# Patient Record
Sex: FEMALE | Race: WHITE | Employment: OTHER | URBAN - METROPOLITAN AREA
[De-identification: names, ages, dates, MRNs, and addresses within clinical notes are randomized per-mention and may not be internally consistent; named-entity substitution may affect disease eponyms.]

---

## 2017-01-04 ENCOUNTER — HOSPITAL ENCOUNTER (OUTPATIENT)
Dept: GENERAL RADIOLOGY | Facility: HOSPITAL | Age: 57
Discharge: HOME | End: 2017-01-04
Attending: UROLOGY
Payer: MEDICAID

## 2017-01-04 DIAGNOSIS — N20.0 KIDNEY STONE: ICD-10-CM

## 2017-01-04 PROCEDURE — 74000 XR ABDOMEN (1 VIEW) (CPT=74000): CPT

## 2017-01-05 ENCOUNTER — OFFICE VISIT (OUTPATIENT)
Dept: SURGERY | Facility: CLINIC | Age: 57
End: 2017-01-05

## 2017-01-05 VITALS
TEMPERATURE: 98 F | BODY MASS INDEX: 21.38 KG/M2 | HEIGHT: 66 IN | DIASTOLIC BLOOD PRESSURE: 64 MMHG | RESPIRATION RATE: 16 BRPM | HEART RATE: 66 BPM | WEIGHT: 133 LBS | SYSTOLIC BLOOD PRESSURE: 110 MMHG

## 2017-01-05 DIAGNOSIS — N20.0 RENAL CALCULI: Primary | ICD-10-CM

## 2017-01-05 PROCEDURE — 52310 CYSTOSCOPY AND TREATMENT: CPT | Performed by: UROLOGY

## 2017-01-24 ENCOUNTER — TELEPHONE (OUTPATIENT)
Dept: SURGERY | Facility: CLINIC | Age: 57
End: 2017-01-24

## 2017-01-31 ENCOUNTER — TELEPHONE (OUTPATIENT)
Dept: SURGERY | Facility: CLINIC | Age: 57
End: 2017-01-31

## 2017-01-31 NOTE — TELEPHONE ENCOUNTER
Patient had cancelled appt for today 01/31/17 due to being sick. Would like to reschedule. appt was for fov s/p renal calculo and was scheduled for 40 mins. Patient would like to get a call back tomorrow. States today she will be resting all day.  Please ad

## 2017-02-16 ENCOUNTER — TELEPHONE (OUTPATIENT)
Dept: SURGERY | Facility: CLINIC | Age: 57
End: 2017-02-16

## 2017-02-16 NOTE — TELEPHONE ENCOUNTER
Please call patient back. Dr. Abigail Faulkner patient. If she is having intermittent pain, please get her in to see Dr. Alanna Quinones next week.   If she is having severe pain, she should go to the emergency room, so that the cause of the pain can be rapidly evaluated an

## 2017-02-16 NOTE — TELEPHONE ENCOUNTER
Spoke with pt. She is c/o nausea, right sided flank pain(describes as above her waist line),radiating to right lower abdominal discomfort, nausea. Denies fever or visualizing blood in her urine. She rates her pain, as \"4\" on pain scale.  States she onset

## 2017-02-16 NOTE — TELEPHONE ENCOUNTER
Phoned pt back and spoke with her. Read to her 's reply as outlined below in this encounter, in it's entirety. Pt verbalized understanding, agrees to plan, and is thankful.  Informed her will ask , when he returns next week, where she can be fit

## 2017-02-20 NOTE — TELEPHONE ENCOUNTER
Phoned pt and received voice mail. TCB. Clinic call back number provided.  (will offer appt for tomorrow, at 09:30 in a split procedure slot)

## 2017-02-20 NOTE — TELEPHONE ENCOUNTER
Pt rtn your call. Offered her appt tomorrow at 9:30am.  She was very thankful and will be here tomorrow.

## 2017-02-21 ENCOUNTER — APPOINTMENT (OUTPATIENT)
Dept: LAB | Facility: HOSPITAL | Age: 57
End: 2017-02-21
Attending: UROLOGY
Payer: MEDICAID

## 2017-02-21 ENCOUNTER — OFFICE VISIT (OUTPATIENT)
Dept: SURGERY | Facility: CLINIC | Age: 57
End: 2017-02-21

## 2017-02-21 VITALS
BODY MASS INDEX: 19.53 KG/M2 | RESPIRATION RATE: 16 BRPM | WEIGHT: 123 LBS | HEIGHT: 66.5 IN | HEART RATE: 84 BPM | SYSTOLIC BLOOD PRESSURE: 110 MMHG | DIASTOLIC BLOOD PRESSURE: 62 MMHG | TEMPERATURE: 98 F

## 2017-02-21 DIAGNOSIS — N20.0 KIDNEY STONE: ICD-10-CM

## 2017-02-21 DIAGNOSIS — N30.01 ACUTE CYSTITIS WITH HEMATURIA: ICD-10-CM

## 2017-02-21 DIAGNOSIS — N30.01 ACUTE CYSTITIS WITH HEMATURIA: Primary | ICD-10-CM

## 2017-02-21 LAB
BACTERIA UR QL AUTO: NEGATIVE /HPF
BILIRUB UR QL: NEGATIVE
COLOR UR: YELLOW
GLUCOSE UR-MCNC: NEGATIVE MG/DL
HGB UR QL STRIP.AUTO: NEGATIVE
NITRITE UR QL STRIP.AUTO: NEGATIVE
PH UR: 5 [PH] (ref 5–8)
PROT UR-MCNC: NEGATIVE MG/DL
RBC #/AREA URNS AUTO: 4 /HPF
SP GR UR STRIP: 1.02 (ref 1–1.03)
UROBILINOGEN UR STRIP-ACNC: <2
VIT C UR-MCNC: 40 MG/DL
WBC #/AREA URNS AUTO: 4 /HPF

## 2017-02-21 PROCEDURE — 81001 URINALYSIS AUTO W/SCOPE: CPT

## 2017-02-21 PROCEDURE — 99212 OFFICE O/P EST SF 10 MIN: CPT | Performed by: UROLOGY

## 2017-02-21 PROCEDURE — 99024 POSTOP FOLLOW-UP VISIT: CPT | Performed by: UROLOGY

## 2017-02-21 PROCEDURE — 87086 URINE CULTURE/COLONY COUNT: CPT

## 2017-02-21 NOTE — PROGRESS NOTES
Lance Gomez 104 Patient Status:  Outpatient    1960 MRN [id number]   Location [facility] Attending Erik Silva.  [address] #  PCP Mariposa Villarreal MD       Jose Miguel Ellis is a 60year old mouth every 8 (eight) hours as needed for Nausea. Disp: 10 tablet Rfl: 0   MetFORMIN HCl  MG Oral Tablet 24 Hr Take 2 tablets (1,000 mg total) by mouth 2 (two) times daily with meals.  Disp: 120 tablet Rfl: 3   Alcohol Swabs (BD SWAB SINGLE USE REGULA 75 mcg by mouth daily. Disp:  Rfl: 1   TRUEPLUS LANCETS 33G Does not apply Misc  Disp:  Rfl:    Hyoscyamine Sulfate 0.125 MG Oral Tab Take 250 mcg by mouth every 4 (four) hours as needed.    Disp:  Rfl: 0   Oxybutynin Chloride 5 MG Oral Tab TAKE 1 TABLET for which patient underwent extracorporeal shockwave lithotripsy December 23, 2016 and KUB showed good breakage and complete resolution of renal stone and patient had successful office cystoscopy stent removal January 5, 2017.   Patient was to follow-up in

## 2017-02-25 ENCOUNTER — HOSPITAL ENCOUNTER (OUTPATIENT)
Dept: GENERAL RADIOLOGY | Facility: HOSPITAL | Age: 57
Discharge: HOME | End: 2017-02-25
Attending: UROLOGY
Payer: MEDICAID

## 2017-02-25 DIAGNOSIS — N20.0 KIDNEY STONE: ICD-10-CM

## 2017-02-25 LAB — CREAT BLD-MCNC: 0.7 MG/DL (ref 0.5–1.5)

## 2017-02-25 PROCEDURE — 82565 ASSAY OF CREATININE: CPT

## 2017-02-25 PROCEDURE — 74415 UROGRAPHY NFS DRIP&/BLS W/NF: CPT

## 2017-02-27 ENCOUNTER — TELEPHONE (OUTPATIENT)
Dept: SURGERY | Facility: CLINIC | Age: 57
End: 2017-02-27

## 2017-02-27 NOTE — TELEPHONE ENCOUNTER
Patient was contacted and was informed of Urine Culture and IVP results as per [facility] [place] [facility]. Patient stated that she passed some fragments Saturday 2/25/17.  She stated that she was feeling slightly better however would call if she didn't feel better on Wedneday

## 2017-05-08 ENCOUNTER — TELEPHONE (OUTPATIENT)
Dept: INTERNAL MEDICINE CLINIC | Facility: CLINIC | Age: 57
End: 2017-05-08

## 2017-05-08 RX ORDER — METFORMIN HYDROCHLORIDE 500 MG/1
TABLET, EXTENDED RELEASE ORAL
Qty: 120 TABLET | Refills: 0 | Status: SHIPPED | OUTPATIENT
Start: 2017-05-08 | End: 2017-05-31

## 2017-05-10 ENCOUNTER — OFFICE VISIT (OUTPATIENT)
Dept: INTERNAL MEDICINE CLINIC | Facility: CLINIC | Age: 57
End: 2017-05-10

## 2017-05-10 VITALS
HEIGHT: 66 IN | DIASTOLIC BLOOD PRESSURE: 76 MMHG | WEIGHT: 131.19 LBS | HEART RATE: 80 BPM | RESPIRATION RATE: 18 BRPM | SYSTOLIC BLOOD PRESSURE: 118 MMHG | BODY MASS INDEX: 21.08 KG/M2

## 2017-05-10 DIAGNOSIS — R63.4 WEIGHT LOSS: ICD-10-CM

## 2017-05-10 DIAGNOSIS — F41.9 ANXIETY AND DEPRESSION: ICD-10-CM

## 2017-05-10 DIAGNOSIS — Z12.11 COLON CANCER SCREENING: ICD-10-CM

## 2017-05-10 DIAGNOSIS — Z12.31 VISIT FOR SCREENING MAMMOGRAM: ICD-10-CM

## 2017-05-10 DIAGNOSIS — E03.9 ACQUIRED HYPOTHYROIDISM: ICD-10-CM

## 2017-05-10 DIAGNOSIS — M54.41 CHRONIC RIGHT-SIDED LOW BACK PAIN WITH RIGHT-SIDED SCIATICA: ICD-10-CM

## 2017-05-10 DIAGNOSIS — Z00.00 ROUTINE HEALTH MAINTENANCE: ICD-10-CM

## 2017-05-10 DIAGNOSIS — F32.A ANXIETY AND DEPRESSION: ICD-10-CM

## 2017-05-10 DIAGNOSIS — G89.29 CHRONIC RIGHT-SIDED LOW BACK PAIN WITH RIGHT-SIDED SCIATICA: ICD-10-CM

## 2017-05-10 DIAGNOSIS — E11.9 TYPE 2 DIABETES MELLITUS WITHOUT COMPLICATION, WITHOUT LONG-TERM CURRENT USE OF INSULIN (HCC): Primary | ICD-10-CM

## 2017-05-10 PROCEDURE — 99212 OFFICE O/P EST SF 10 MIN: CPT | Performed by: INTERNAL MEDICINE

## 2017-05-10 PROCEDURE — 99215 OFFICE O/P EST HI 40 MIN: CPT | Performed by: INTERNAL MEDICINE

## 2017-05-10 NOTE — PROGRESS NOTES
HPI:    Patient ID: Bijan Kaufman is a 60year old female. HPI Comments: Pt used to go to [place]. She hasn't seen an internist since September. She had a kidney problem. She lost 25 pounds before January.     Diabetes  She presents for her follow-up MOUTH TWO TIMES A DAY WITH MEALS Disp: 120 tablet Rfl: 0   ondansetron 4 MG Oral Tablet Dispersible Take 1 tablet (4 mg total) by mouth every 8 (eight) hours as needed for Nausea.  Disp: 10 tablet Rfl: 0   Alcohol Swabs (BD SWAB SINGLE USE REGULAR) Does not and breath sounds normal. No respiratory distress. She has no wheezes. She has no rales. Abdominal: Soft. Bowel sounds are normal. She exhibits no mass. There is no tenderness. Musculoskeletal: Normal range of motion.    Lymphadenopathy:     She has no

## 2017-05-23 ENCOUNTER — TELEPHONE (OUTPATIENT)
Dept: INTERNAL MEDICINE CLINIC | Facility: CLINIC | Age: 57
End: 2017-05-23

## 2017-05-23 DIAGNOSIS — Z11.59 NEED FOR HEPATITIS C SCREENING TEST: Primary | ICD-10-CM

## 2017-05-23 NOTE — TELEPHONE ENCOUNTER
She recently had a loss in her family, 28 yo female  by a hit and run. Patient was upset, stated she is not taking care of herself, but will go to the lab Thursday.

## 2017-05-25 ENCOUNTER — LAB ENCOUNTER (OUTPATIENT)
Dept: LAB | Facility: HOSPITAL | Age: 57
End: 2017-05-25
Attending: INTERNAL MEDICINE
Payer: MEDICAID

## 2017-05-25 DIAGNOSIS — Z00.00 ROUTINE HEALTH MAINTENANCE: ICD-10-CM

## 2017-05-25 DIAGNOSIS — Z11.59 NEED FOR HEPATITIS C SCREENING TEST: ICD-10-CM

## 2017-05-25 DIAGNOSIS — E11.9 TYPE 2 DIABETES MELLITUS WITHOUT COMPLICATION, WITHOUT LONG-TERM CURRENT USE OF INSULIN (HCC): ICD-10-CM

## 2017-05-25 PROCEDURE — 80061 LIPID PANEL: CPT

## 2017-05-25 PROCEDURE — 84443 ASSAY THYROID STIM HORMONE: CPT

## 2017-05-25 PROCEDURE — 82043 UR ALBUMIN QUANTITATIVE: CPT

## 2017-05-25 PROCEDURE — 83036 HEMOGLOBIN GLYCOSYLATED A1C: CPT

## 2017-05-25 PROCEDURE — 86803 HEPATITIS C AB TEST: CPT

## 2017-05-25 PROCEDURE — 36415 COLL VENOUS BLD VENIPUNCTURE: CPT

## 2017-05-25 PROCEDURE — 85025 COMPLETE CBC W/AUTO DIFF WBC: CPT

## 2017-05-25 PROCEDURE — 82570 ASSAY OF URINE CREATININE: CPT

## 2017-05-25 PROCEDURE — 80053 COMPREHEN METABOLIC PANEL: CPT

## 2017-05-29 ENCOUNTER — TELEPHONE (OUTPATIENT)
Dept: INTERNAL MEDICINE CLINIC | Facility: CLINIC | Age: 57
End: 2017-05-29

## 2017-05-29 DIAGNOSIS — E78.00 HYPERCHOLESTEROLEMIA: Primary | ICD-10-CM

## 2017-05-29 RX ORDER — ROSUVASTATIN CALCIUM 5 MG/1
TABLET, COATED ORAL
Qty: 15 TABLET | Refills: 5 | Status: SHIPPED | OUTPATIENT
Start: 2017-05-29 | End: 2019-04-30

## 2017-05-29 NOTE — TELEPHONE ENCOUNTER
Please call pt: Your hepatitis C test is negative. You do not have hepatitis C. Your urine, kidney, liver, electrolytes, and thyroid tests were all normal.  Your white count was slightly low. We can repeat it in 3 months with your next A1C.   Your A1C w

## 2017-05-30 RX ORDER — METFORMIN HYDROCHLORIDE 500 MG/1
TABLET, EXTENDED RELEASE ORAL
Qty: 120 TABLET | Refills: 3 | OUTPATIENT
Start: 2017-05-30

## 2017-05-31 RX ORDER — METFORMIN HYDROCHLORIDE 500 MG/1
TABLET, EXTENDED RELEASE ORAL
Qty: 120 TABLET | Refills: 3 | Status: SHIPPED | OUTPATIENT
Start: 2017-05-31 | End: 2017-06-05

## 2017-06-05 RX ORDER — METFORMIN HYDROCHLORIDE 500 MG/1
TABLET, EXTENDED RELEASE ORAL
Qty: 120 TABLET | Refills: 0 | Status: SHIPPED | OUTPATIENT
Start: 2017-06-05 | End: 2017-11-06

## 2017-06-05 NOTE — TELEPHONE ENCOUNTER
Informed pt test results and Dr Nhung Diana message below. Pt states she needs a cardiology referral for injections for high cholesterol. Referral pended for review.

## 2017-06-06 NOTE — TELEPHONE ENCOUNTER
Spoke with patient and advised her  gave ok to see Cardiology. Patient voiced her understanding of this.

## 2017-08-30 ENCOUNTER — OFFICE VISIT (OUTPATIENT)
Dept: INTERNAL MEDICINE CLINIC | Facility: CLINIC | Age: 57
End: 2017-08-30

## 2017-08-30 VITALS
SYSTOLIC BLOOD PRESSURE: 114 MMHG | HEIGHT: 66 IN | WEIGHT: 127.38 LBS | HEART RATE: 84 BPM | DIASTOLIC BLOOD PRESSURE: 70 MMHG | BODY MASS INDEX: 20.47 KG/M2 | RESPIRATION RATE: 18 BRPM

## 2017-08-30 DIAGNOSIS — G89.29 CHRONIC RIGHT SHOULDER PAIN: ICD-10-CM

## 2017-08-30 DIAGNOSIS — Z12.31 VISIT FOR SCREENING MAMMOGRAM: ICD-10-CM

## 2017-08-30 DIAGNOSIS — E78.00 HYPERCHOLESTEREMIA: ICD-10-CM

## 2017-08-30 DIAGNOSIS — E11.9 TYPE 2 DIABETES MELLITUS WITHOUT COMPLICATION, WITHOUT LONG-TERM CURRENT USE OF INSULIN (HCC): Primary | ICD-10-CM

## 2017-08-30 DIAGNOSIS — M25.511 CHRONIC RIGHT SHOULDER PAIN: ICD-10-CM

## 2017-08-30 DIAGNOSIS — Z12.11 COLON CANCER SCREENING: ICD-10-CM

## 2017-08-30 DIAGNOSIS — E78.00 HYPERCHOLESTEROLEMIA: ICD-10-CM

## 2017-08-30 PROCEDURE — 99212 OFFICE O/P EST SF 10 MIN: CPT | Performed by: INTERNAL MEDICINE

## 2017-08-30 PROCEDURE — 99214 OFFICE O/P EST MOD 30 MIN: CPT | Performed by: INTERNAL MEDICINE

## 2017-08-30 NOTE — PATIENT INSTRUCTIONS
Please schedule your mammogram.  Call [phone]. Do fasting labs in November. Fast for 12 hours. Water is okay. Walk in.

## 2017-08-30 NOTE — ASSESSMENT & PLAN NOTE
Her cholesterol is extremely high. She never took the Crestor. She will try it. Recheck lipids in 3 months.

## 2017-08-30 NOTE — PROGRESS NOTES
HPI:    Patient ID: Jakub Rashid is a 59year old female. For 18 months she had left shoulder pain. She went to PT and the pain was so terribly severe. It is now manageable, but it now started on the right side.   She goes to the pain clinic for her b Calcium (CRESTOR) 5 MG Oral Tab One half tab on Monday, Wednesday, and Friday. Disp: 15 tablet Rfl: 5   ondansetron 4 MG Oral Tablet Dispersible Take 1 tablet (4 mg total) by mouth every 8 (eight) hours as needed for Nausea.  Disp: 10 tablet Rfl: 0   Alcoho Physical Exam   Nursing note and vitals reviewed. Constitutional: She is oriented to person, place, and time. She appears well-developed and well-nourished. HENT:   Head: Normocephalic and atraumatic.    Eyes: Conjunctivae and EOM are normal.   Cardio

## 2017-09-12 ENCOUNTER — OFFICE VISIT (OUTPATIENT)
Dept: NEUROLOGY | Facility: CLINIC | Age: 57
End: 2017-09-12

## 2017-09-12 VITALS
DIASTOLIC BLOOD PRESSURE: 54 MMHG | HEART RATE: 80 BPM | SYSTOLIC BLOOD PRESSURE: 104 MMHG | RESPIRATION RATE: 16 BRPM | HEIGHT: 66.5 IN | WEIGHT: 127 LBS | BODY MASS INDEX: 20.17 KG/M2

## 2017-09-12 DIAGNOSIS — M75.41 ROTATOR CUFF IMPINGEMENT SYNDROME OF RIGHT SHOULDER: Primary | ICD-10-CM

## 2017-09-12 PROCEDURE — 99204 OFFICE O/P NEW MOD 45 MIN: CPT | Performed by: PHYSICAL MEDICINE & REHABILITATION

## 2017-09-12 RX ORDER — ONDANSETRON 4 MG/1
4 TABLET, ORALLY DISINTEGRATING ORAL EVERY 8 HOURS PRN
Qty: 10 TABLET | Refills: 1 | Status: SHIPPED | OUTPATIENT
Start: 2017-09-12 | End: 2018-03-07

## 2017-09-12 RX ORDER — DICLOFENAC SODIUM 75 MG/1
75 TABLET, DELAYED RELEASE ORAL 2 TIMES DAILY
Qty: 60 TABLET | Refills: 0 | Status: SHIPPED | OUTPATIENT
Start: 2017-09-12 | End: 2017-10-12

## 2017-09-12 NOTE — TELEPHONE ENCOUNTER
PATIENT IS REQUESTING ZOFRAN FOR NAUSEA. PATENT STATES SHE'S NOT SURE IF DR. HOGAN IS THE ONE THAT PRESCRIBED THIS MEDICATION BUT SHE STATES SHE WANTS IT FOR NAUSEA.

## 2017-09-12 NOTE — H&P
No referring provider defined for this encounter.   Cristina Vance MD     PHYSICAL MEDICINE and REHABILITATION CONSULTATION    Chief Complaint: right shoulder pain    HPI: Loi Lux is a 59year old female who presents with complaints of Shoulder Pain ( Bowel/Bladder incontinence: no  Numbness/Tingling: yes  Seizure: no  Weight loss: unintended weight loss for the past year (25lbs in a year).     Past Medical History:   Diagnosis Date   • Diabetes [facility])    • Kidney stones      Family History   Problem Rela Occupational History  None on file     Social History Main Topics   Smoking status: Former Smoker  1.00 Packs/day  For 45.00 Years     Types: Cigarettes    Quit date: 11/30/2012    Smokeless tobacco: Never Used    Alcohol use No    Drug use: No    Sexual a Wrist extension 5 5       Finger flexion 5 5       Internal Rotation of Shoulder * 5       External Rotation of Shoulder 5 5       5=normal, 4=mild weak, 3=moderate,   2=severe (<antigravity), 1=tracing,   0=no movement.   *gives way due to pain    Sensatio

## 2017-09-12 NOTE — PATIENT INSTRUCTIONS
1) Your diagnosis is right rotator cuff tendinopathy. 2) Take voltaren 75mg twice a day as needed. Please take this with food to avoid an upset stomach.     3) If your pain continues in 2 weeks despite the medication please make a follow up appointment a prescription as our physicians rotate between multiple offices and procedure days in the hospitals. · Patient must present photo ID at time of .  If a designated family member will be picking up prescription, office must be given name of individual

## 2017-10-12 RX ORDER — DICLOFENAC SODIUM 75 MG/1
75 TABLET, DELAYED RELEASE ORAL 2 TIMES DAILY
Qty: 60 TABLET | Refills: 0 | Status: SHIPPED | OUTPATIENT
Start: 2017-10-12 | End: 2018-04-16

## 2017-10-12 NOTE — TELEPHONE ENCOUNTER
Refill request for diclofenac 75 mg, BID, #60, no refills    LOV: 9/12/17  NOV: none  Last refilled on 9/12/17

## 2017-11-06 ENCOUNTER — TELEPHONE (OUTPATIENT)
Dept: OTHER | Age: 57
End: 2017-11-06

## 2017-11-06 RX ORDER — METFORMIN HYDROCHLORIDE 500 MG/1
TABLET, EXTENDED RELEASE ORAL
Qty: 120 TABLET | Refills: 2 | Status: SHIPPED | OUTPATIENT
Start: 2017-11-06 | End: 2018-03-29

## 2017-11-06 NOTE — TELEPHONE ENCOUNTER
Pharmacy called requesting a refill. Refilled per protocol. Type 2 diabetes mellitus without complication, without long-term current use of insulin (HCC) - Primary        Her diabetes is controlled.   A1C was 6.5  cpm  She saw ophthal.  Urine is negat

## 2017-11-30 NOTE — TELEPHONE ENCOUNTER
Pharmacy called requesting refill for Current Outpatient Prescriptions:  Levothyroxine Sodium 75 MCG Oral Tab Take 75 mcg by mouth daily. Disp:  Rfl: 1     Please advise, thank you.

## 2017-12-01 RX ORDER — LEVOTHYROXINE SODIUM 0.07 MG/1
TABLET ORAL
Qty: 30 TABLET | Refills: 1 | Status: SHIPPED | OUTPATIENT
Start: 2017-12-01 | End: 2018-05-18

## 2017-12-01 NOTE — TELEPHONE ENCOUNTER
Patient is Junaid Tai - please advise on refill     Hypothyroid Medications  Protocol Criteria:  Appointment scheduled in the past 12 months or the next 3 months  TSH resulted in the past 12 months that is normal  Recent Outpatient Visits            2 kristen

## 2018-02-28 ENCOUNTER — TELEPHONE (OUTPATIENT)
Dept: OTHER | Age: 58
End: 2018-02-28

## 2018-02-28 NOTE — TELEPHONE ENCOUNTER
Pt stts she had to cancel her appt with you today because she woke up with dizziness and indigestion . Denies chest pain or shortness of breath. Pt cannot check her BS because her glucometer is not working.  I advised pt to eat her breakfast and drink fluid

## 2018-03-01 ENCOUNTER — OFFICE VISIT (OUTPATIENT)
Dept: INTERNAL MEDICINE CLINIC | Facility: CLINIC | Age: 58
End: 2018-03-01

## 2018-03-01 VITALS
BODY MASS INDEX: 20.49 KG/M2 | SYSTOLIC BLOOD PRESSURE: 90 MMHG | WEIGHT: 129 LBS | DIASTOLIC BLOOD PRESSURE: 58 MMHG | HEIGHT: 66.5 IN | HEART RATE: 80 BPM

## 2018-03-01 DIAGNOSIS — M75.41 ROTATOR CUFF IMPINGEMENT SYNDROME OF RIGHT SHOULDER: Primary | ICD-10-CM

## 2018-03-01 PROCEDURE — 99213 OFFICE O/P EST LOW 20 MIN: CPT | Performed by: PHYSICIAN ASSISTANT

## 2018-03-01 RX ORDER — AMITRIPTYLINE HYDROCHLORIDE 10 MG/1
10 TABLET, FILM COATED ORAL 3 TIMES DAILY
COMMUNITY
End: 2018-07-16

## 2018-03-01 NOTE — PROGRESS NOTES
HPI:    Patient ID: Víctor Dillard is a 59year old female. HPI   Patient with history of type 2 diabetes, hypothyroidism, and hyperlipidemia presents with ongoing right shoulder pain. Pain has been present for over a year.   She was recently seen by amanda as needed for Nausea. Disp: 10 tablet Rfl: 1   Rosuvastatin Calcium (CRESTOR) 5 MG Oral Tab One half tab on Monday, Wednesday, and Friday.  (Patient taking differently: One  tab on Monday, Wednesday, and Friday. ) Disp: 15 tablet Rfl: 5   Alcohol Swabs (BD placed in this encounter.       Meds This Visit:  No prescriptions requested or ordered in this encounter       Imaging & Referrals:  INTEGRATIVE MEDICINE PHYSICIAN CONSULTATION - INTERNAL REFERRAL  OP REFERRAL PAIN MANGEMENT         ZF#[id number]

## 2018-03-07 RX ORDER — ONDANSETRON 4 MG/1
TABLET, ORALLY DISINTEGRATING ORAL
Qty: 10 TABLET | Refills: 0 | Status: SHIPPED | OUTPATIENT
Start: 2018-03-07 | End: 2018-07-16

## 2018-03-07 NOTE — TELEPHONE ENCOUNTER
Please advise on refill request.    Refill Protocol Appointment Criteria  · Appointment scheduled in the past 6 months or in the next 3 months  Recent Outpatient Visits            6 days ago Rotator cuff impingement syndrome of right shoulder    Tomi MARTÍNEZ

## 2018-03-29 DIAGNOSIS — Z00.00 HEALTHCARE MAINTENANCE: Primary | ICD-10-CM

## 2018-04-01 RX ORDER — METFORMIN HYDROCHLORIDE 500 MG/1
TABLET, EXTENDED RELEASE ORAL
Qty: 120 TABLET | Refills: 0 | Status: SHIPPED | OUTPATIENT
Start: 2018-04-01 | End: 2018-05-18

## 2018-04-13 ENCOUNTER — TELEPHONE (OUTPATIENT)
Dept: OTHER | Age: 58
End: 2018-04-13

## 2018-04-13 NOTE — TELEPHONE ENCOUNTER
Spoke with patient and states since December, she experiences nausea, sweating, heart racing. Patient states she has lost 25 lbs in the past year without dieting. She states her glucometer is broken--unable to check her sugar.      Spoke with LV and rece

## 2018-04-16 ENCOUNTER — OFFICE VISIT (OUTPATIENT)
Dept: INTERNAL MEDICINE CLINIC | Facility: CLINIC | Age: 58
End: 2018-04-16

## 2018-04-16 VITALS
HEIGHT: 66.5 IN | SYSTOLIC BLOOD PRESSURE: 113 MMHG | BODY MASS INDEX: 20.3 KG/M2 | HEART RATE: 74 BPM | WEIGHT: 127.81 LBS | DIASTOLIC BLOOD PRESSURE: 67 MMHG

## 2018-04-16 DIAGNOSIS — E78.00 HYPERCHOLESTEREMIA: ICD-10-CM

## 2018-04-16 DIAGNOSIS — R92.2 DENSE BREAST: ICD-10-CM

## 2018-04-16 DIAGNOSIS — F32.A MILD DEPRESSION: ICD-10-CM

## 2018-04-16 DIAGNOSIS — Z12.31 VISIT FOR SCREENING MAMMOGRAM: ICD-10-CM

## 2018-04-16 DIAGNOSIS — Z12.11 COLON CANCER SCREENING: ICD-10-CM

## 2018-04-16 DIAGNOSIS — E11.9 TYPE 2 DIABETES MELLITUS WITHOUT COMPLICATION, WITHOUT LONG-TERM CURRENT USE OF INSULIN (HCC): Primary | ICD-10-CM

## 2018-04-16 DIAGNOSIS — M25.511 CHRONIC RIGHT SHOULDER PAIN: ICD-10-CM

## 2018-04-16 DIAGNOSIS — G89.29 CHRONIC RIGHT SHOULDER PAIN: ICD-10-CM

## 2018-04-16 DIAGNOSIS — E03.9 ACQUIRED HYPOTHYROIDISM: ICD-10-CM

## 2018-04-16 PROCEDURE — 99212 OFFICE O/P EST SF 10 MIN: CPT | Performed by: INTERNAL MEDICINE

## 2018-04-16 PROCEDURE — 99214 OFFICE O/P EST MOD 30 MIN: CPT | Performed by: INTERNAL MEDICINE

## 2018-04-16 RX ORDER — FLUOXETINE HYDROCHLORIDE 40 MG/1
CAPSULE ORAL
Qty: 90 CAPSULE | Refills: 1 | Status: SHIPPED | OUTPATIENT
Start: 2018-04-16 | End: 2019-02-05

## 2018-04-16 NOTE — PROGRESS NOTES
HPI:    Patient ID: Chey Aguirre is a 59year old female. Pt has not been feeling well. She feels lightheaded frequently. Her sugar is all over the place from . She doesn't feel like it is predictable. She is only taking metformin.   She does no EVERY 8 HOURS AS NEEDED FOR NAUSEA Disp: 10 tablet Rfl: 0   Levothyroxine Sodium 75 MCG Oral Tab Pt will need a new doctor, because [place] is no longer in her insurance. 1 daily.  Disp: 30 tablet Rfl: 1   Rosuvastatin Calcium (CRESTOR) 5 MG Oral Tab One well-developed and well-nourished. HENT:   Head: Normocephalic and atraumatic. Eyes: Conjunctivae and EOM are normal.   Cardiovascular: Normal rate, regular rhythm and normal heart sounds.     Pulmonary/Chest: Effort normal and breath sounds normal. No Medicaid. She has tried many other antidepressants. I encouraged her to pursue the [employer] $4 fluoxetine prescription program.  Patient agreed that this would be a good plan for her.          Relevant Medications    FLUoxetine HCl 40 MG Oral Cap      Othe

## 2018-04-17 NOTE — ASSESSMENT & PLAN NOTE
Patient has chronic right shoulder pain and frozen shoulder. She sees a specialist for this and receives oxycodone. She says this prevents her from sleeping at night.

## 2018-04-17 NOTE — ASSESSMENT & PLAN NOTE
The patient has a very erratic eating schedule where she skips breakfast and lunch and then eats multiple meals late in the day. She checks her sugars and and says they are quite labile.   She has not had an A1c since last May which she says is due to Friedman

## 2018-04-17 NOTE — ASSESSMENT & PLAN NOTE
Per patient fluoxetine is not covered under Medicaid. She has tried many other antidepressants. I encouraged her to pursue the [employer] $4 fluoxetine prescription program.  Patient agreed that this would be a good plan for her.

## 2018-04-17 NOTE — ASSESSMENT & PLAN NOTE
Patient's cholesterol was 340 last year when she agreed to start Crestor. She is taking this twice a week. Will check lipids.

## 2018-04-20 ENCOUNTER — LAB ENCOUNTER (OUTPATIENT)
Dept: LAB | Facility: HOSPITAL | Age: 58
End: 2018-04-20
Attending: INTERNAL MEDICINE
Payer: MEDICAID

## 2018-04-20 DIAGNOSIS — Z00.00 HEALTHCARE MAINTENANCE: ICD-10-CM

## 2018-04-20 PROCEDURE — 36415 COLL VENOUS BLD VENIPUNCTURE: CPT

## 2018-04-20 PROCEDURE — 80061 LIPID PANEL: CPT

## 2018-04-20 PROCEDURE — 80053 COMPREHEN METABOLIC PANEL: CPT

## 2018-04-20 PROCEDURE — 82570 ASSAY OF URINE CREATININE: CPT

## 2018-04-20 PROCEDURE — 82043 UR ALBUMIN QUANTITATIVE: CPT

## 2018-04-20 PROCEDURE — 84443 ASSAY THYROID STIM HORMONE: CPT

## 2018-04-20 PROCEDURE — 83036 HEMOGLOBIN GLYCOSYLATED A1C: CPT

## 2018-04-20 PROCEDURE — 85025 COMPLETE CBC W/AUTO DIFF WBC: CPT

## 2018-05-18 RX ORDER — LEVOTHYROXINE SODIUM 0.07 MG/1
TABLET ORAL
Qty: 90 TABLET | Refills: 0 | Status: SHIPPED | OUTPATIENT
Start: 2018-05-18 | End: 2018-08-03

## 2018-05-18 RX ORDER — METFORMIN HYDROCHLORIDE 500 MG/1
TABLET, EXTENDED RELEASE ORAL
Qty: 180 TABLET | Refills: 0 | Status: SHIPPED | OUTPATIENT
Start: 2018-05-18 | End: 2018-08-06

## 2018-05-18 NOTE — TELEPHONE ENCOUNTER
Diabetes Medication Protocol Passed5/18 2:41 PM   Creatinine in the past 12 months    Last A1C < 7.5 and within past 6 months    Last serum creatinine result < 1.5    Appointment in past 6 or next 3 months       Thyroid Medication Protocol Passed5/18 2:41

## 2018-06-27 ENCOUNTER — TELEPHONE (OUTPATIENT)
Dept: OTHER | Age: 58
End: 2018-06-27

## 2018-06-27 NOTE — TELEPHONE ENCOUNTER
Pt had to cancel appt today due to car trouble. Rescheduled for 7/16. Wants to know if that is ok since she had protein in her urine at last visit 4/16/18. She does not currently check her blood sugars at home.   Advised pt make sure she keep appt for 7/

## 2018-06-29 ENCOUNTER — OFFICE VISIT (OUTPATIENT)
Dept: OPTOMETRY | Facility: CLINIC | Age: 58
End: 2018-06-29

## 2018-06-29 DIAGNOSIS — H40.003 GLAUCOMA SUSPECT, BILATERAL: ICD-10-CM

## 2018-06-29 DIAGNOSIS — H25.13 AGE-RELATED NUCLEAR CATARACT OF BOTH EYES: ICD-10-CM

## 2018-06-29 DIAGNOSIS — E11.9 TYPE 2 DIABETES MELLITUS WITHOUT COMPLICATION, WITHOUT LONG-TERM CURRENT USE OF INSULIN (HCC): Primary | ICD-10-CM

## 2018-06-29 DIAGNOSIS — H52.4 PRESBYOPIA: ICD-10-CM

## 2018-06-29 PROCEDURE — 92004 COMPRE OPH EXAM NEW PT 1/>: CPT | Performed by: OPTOMETRIST

## 2018-06-29 NOTE — PROGRESS NOTES
Parisa Bello is a 59year old female. HPI:     HPI     Diabetic Eye Exam   Diabetes characteristics include Type 2, controlled with diet and taking oral medications. Duration of 8 years. Number of years on pills 8.   Does PT check his/her own bloodsugar BY MOUTH DAILY Disp: 90 tablet Rfl: 0   FLUoxetine HCl 40 MG Oral Cap TAKE ONE CAPSULE BY MOUTH IN THE MORNING ONCE DAILY Disp: 90 capsule Rfl: 1   Blood Glucose Monitoring Suppl Supplies Does not apply Misc Please supply patient with glucose meter, test s Extraocular Movement       Right Left     Full, Ortho Full, Ortho          Neuro/Psych     Oriented x3:  Yes          Dilation     Both eyes:  1.0% Mydriacyl and 2.5% Kaden Synephrine @ 3:03 PM            Additional Tests     Amsler       Right Left     Norm Presbyopia  Patient will continue with OTC reading glasses. Age-related nuclear cataract of both eyes  No treatment is required. Will continue to observe.         Orders Placed This Encounter      OCT, Optic Nerve - OU - Both Eyes      Yo Puckett

## 2018-06-29 NOTE — ASSESSMENT & PLAN NOTE
I advised patient that due to CD asymmetry and IOP that she should have a baseline VF OCT and pachy. Appointment has been made.

## 2018-06-29 NOTE — PATIENT INSTRUCTIONS
Glaucoma suspect, bilateral  I advised patient that due to CD asymmetry and IOP that she should have a baseline VF OCT and pachy. Appointment has been made.     Type 2 diabetes mellitus without complication, without long-term current use of insulin ([facility]  I

## 2018-07-07 ENCOUNTER — NURSE ONLY (OUTPATIENT)
Dept: OPHTHALMOLOGY | Facility: CLINIC | Age: 58
End: 2018-07-07

## 2018-07-07 DIAGNOSIS — H40.003 GLAUCOMA SUSPECT, BILATERAL: ICD-10-CM

## 2018-07-07 PROCEDURE — 76514 ECHO EXAM OF EYE THICKNESS: CPT | Performed by: OPHTHALMOLOGY

## 2018-07-07 PROCEDURE — 92133 CPTRZD OPH DX IMG PST SGM ON: CPT | Performed by: OPHTHALMOLOGY

## 2018-07-07 PROCEDURE — 92083 EXTENDED VISUAL FIELD XM: CPT | Performed by: OPHTHALMOLOGY

## 2018-07-10 NOTE — PROGRESS NOTES
Maryellen Ervin is a 59year old female. HPI:     HPI     Patient is here for a glaucoma work up with  HVF, OCT and Pachy, enrique BUCHANAN     Last edited by Sandee Akbar on 7/7/2018 10:39 AM. (History)        Patient History:  Past Medical History:   Diagnosis Da and Friday.  (Patient taking differently: Pt takes one whole tab two times a week ) Disp: 15 tablet Rfl: 5   Alcohol Swabs (BD SWAB SINGLE USE REGULAR) Does not apply Pads  Disp:  Rfl:    Blood Glucose Monitoring Suppl (TRUE METRIX METER) W/DEVICE Does not

## 2018-07-16 ENCOUNTER — APPOINTMENT (OUTPATIENT)
Dept: LAB | Age: 58
End: 2018-07-16
Attending: INTERNAL MEDICINE
Payer: MEDICAID

## 2018-07-16 ENCOUNTER — OFFICE VISIT (OUTPATIENT)
Dept: INTERNAL MEDICINE CLINIC | Facility: CLINIC | Age: 58
End: 2018-07-16

## 2018-07-16 VITALS
HEART RATE: 77 BPM | DIASTOLIC BLOOD PRESSURE: 67 MMHG | SYSTOLIC BLOOD PRESSURE: 114 MMHG | HEIGHT: 66.5 IN | BODY MASS INDEX: 19.44 KG/M2 | WEIGHT: 122.38 LBS

## 2018-07-16 DIAGNOSIS — Z12.11 SCREEN FOR COLON CANCER: ICD-10-CM

## 2018-07-16 DIAGNOSIS — E78.00 HYPERCHOLESTEREMIA: ICD-10-CM

## 2018-07-16 DIAGNOSIS — E11.9 TYPE 2 DIABETES MELLITUS WITHOUT COMPLICATION, WITHOUT LONG-TERM CURRENT USE OF INSULIN (HCC): ICD-10-CM

## 2018-07-16 DIAGNOSIS — R63.4 WEIGHT LOSS: ICD-10-CM

## 2018-07-16 DIAGNOSIS — M75.41 ROTATOR CUFF IMPINGEMENT SYNDROME OF RIGHT SHOULDER: ICD-10-CM

## 2018-07-16 DIAGNOSIS — E11.9 TYPE 2 DIABETES MELLITUS WITHOUT COMPLICATION, WITHOUT LONG-TERM CURRENT USE OF INSULIN (HCC): Primary | ICD-10-CM

## 2018-07-16 LAB
ANION GAP SERPL CALC-SCNC: 5 MMOL/L (ref 0–18)
BILIRUB UR QL: NEGATIVE
BUN SERPL-MCNC: 10 MG/DL (ref 8–20)
BUN/CREAT SERPL: 12.2 (ref 10–20)
CALCIUM SERPL-MCNC: 9.9 MG/DL (ref 8.5–10.5)
CHLORIDE SERPL-SCNC: 103 MMOL/L (ref 95–110)
CLARITY UR: CLEAR
CO2 SERPL-SCNC: 30 MMOL/L (ref 22–32)
COLOR UR: YELLOW
CREAT SERPL-MCNC: 0.82 MG/DL (ref 0.5–1.5)
GLUCOSE SERPL-MCNC: 130 MG/DL (ref 70–99)
GLUCOSE UR-MCNC: NEGATIVE MG/DL
HGB UR QL STRIP.AUTO: NEGATIVE
KETONES UR-MCNC: NEGATIVE MG/DL
NITRITE UR QL STRIP.AUTO: NEGATIVE
OSMOLALITY UR CALC.SUM OF ELEC: 287 MOSM/KG (ref 275–295)
PH UR: 7 [PH] (ref 5–8)
POTASSIUM SERPL-SCNC: 4.3 MMOL/L (ref 3.3–5.1)
PROT UR-MCNC: NEGATIVE MG/DL
RBC #/AREA URNS AUTO: <1 /HPF
SODIUM SERPL-SCNC: 138 MMOL/L (ref 136–144)
SP GR UR STRIP: 1.01 (ref 1–1.03)
UROBILINOGEN UR STRIP-ACNC: <2
VIT C UR-MCNC: NEGATIVE MG/DL
WBC #/AREA URNS AUTO: 17 /HPF

## 2018-07-16 PROCEDURE — 87086 URINE CULTURE/COLONY COUNT: CPT | Performed by: INTERNAL MEDICINE

## 2018-07-16 PROCEDURE — 81001 URINALYSIS AUTO W/SCOPE: CPT | Performed by: INTERNAL MEDICINE

## 2018-07-16 PROCEDURE — 36415 COLL VENOUS BLD VENIPUNCTURE: CPT

## 2018-07-16 PROCEDURE — 80048 BASIC METABOLIC PNL TOTAL CA: CPT

## 2018-07-16 PROCEDURE — 99212 OFFICE O/P EST SF 10 MIN: CPT | Performed by: INTERNAL MEDICINE

## 2018-07-16 PROCEDURE — 83036 HEMOGLOBIN GLYCOSYLATED A1C: CPT

## 2018-07-16 PROCEDURE — 99214 OFFICE O/P EST MOD 30 MIN: CPT | Performed by: INTERNAL MEDICINE

## 2018-07-16 RX ORDER — EZETIMIBE 10 MG/1
10 TABLET ORAL DAILY
Qty: 90 TABLET | Refills: 1 | Status: SHIPPED | OUTPATIENT
Start: 2018-07-16 | End: 2019-07-11

## 2018-07-16 RX ORDER — ONDANSETRON 4 MG/1
TABLET, ORALLY DISINTEGRATING ORAL
Qty: 10 TABLET | Refills: 0 | Status: SHIPPED | OUTPATIENT
Start: 2018-07-16 | End: 2018-09-01

## 2018-07-16 NOTE — PATIENT INSTRUCTIONS
Today's blood test non-fasting. Do fasting labs before next appointment in October. Please schedule your mammogram.  Call [phone].

## 2018-07-16 NOTE — ASSESSMENT & PLAN NOTE
Pt has very high cholesterol but refuses statin due to intolerance (myalgia). Will try zetia. Recheck labs in 3 months.

## 2018-07-16 NOTE — ASSESSMENT & PLAN NOTE
Pt has lost 8 pounds this year. Advised pt to first do routine cancer screening tests, such as mammogram and colonoscopy.   If she continues to have weight loss, will do other test.

## 2018-07-16 NOTE — ASSESSMENT & PLAN NOTE
Her diabetes is controlled. Pt refuses pneumovax. Will start ARB next visit because starting Zetia now. Refuses statin due to intolerance. F/u 3 months.

## 2018-07-16 NOTE — PROGRESS NOTES
HPI:    Patient ID: Hi Newby is a 59year old female. She has been having right shoulder pain. The pain doctor ordered an MRI. He is treating her with oxycodone for 10 years for that and back pain. She doesn't check her blood sugars.   She takes her OxyCODONE HCl IR 30 MG Oral Tab Take 30 mg by mouth as needed.    Disp:  Rfl: 0   Blood Glucose Monitoring Suppl Supplies Does not apply Misc Please supply patient with glucose meter, test strips and lancets covered by her insurance, test TID Disp: 1 kit and normal heart sounds. Pulmonary/Chest: Effort normal and breath sounds normal. No respiratory distress. Neurological: She is alert and oriented to person, place, and time.               ASSESSMENT/PLAN:     Problem List Items Addressed This Visit

## 2018-07-17 LAB — HBA1C MFR BLD: 6.9 % (ref 4–6)

## 2018-07-20 ENCOUNTER — TELEPHONE (OUTPATIENT)
Dept: INTERNAL MEDICINE CLINIC | Facility: CLINIC | Age: 58
End: 2018-07-20

## 2018-07-20 NOTE — TELEPHONE ENCOUNTER
PA for Ezetimibe 10 mg tab completed with [employer] via CMM response time 3-5 business days [address]

## 2018-07-20 NOTE — TELEPHONE ENCOUNTER
Fax received at [address] [place] [address] following medication needs Prior auth. Plan does not cove this medication. Please call plan at [phone] to initiate prior auth or call/fax pharmacy to change medication. Patient JJ#[id number].       Current Outpatient Prescription

## 2018-08-04 RX ORDER — LEVOTHYROXINE SODIUM 0.07 MG/1
TABLET ORAL
Qty: 90 TABLET | Refills: 0 | Status: SHIPPED | OUTPATIENT
Start: 2018-08-04 | End: 2018-11-15

## 2018-08-04 NOTE — TELEPHONE ENCOUNTER
Refill passed per [facility] protocol.   Hypothyroid Medications  Protocol Criteria:  Appointment scheduled in the past 12 months or the next 3 months  TSH resulted in the past 12 months that is normal  Recent Outpatient Visits            2 weeks ago T

## 2018-08-06 RX ORDER — METFORMIN HYDROCHLORIDE 500 MG/1
TABLET, EXTENDED RELEASE ORAL
Qty: 360 TABLET | Refills: 0 | Status: SHIPPED | OUTPATIENT
Start: 2018-08-06 | End: 2018-11-15

## 2018-08-11 ENCOUNTER — HOSPITAL ENCOUNTER (OUTPATIENT)
Dept: MAMMOGRAPHY | Facility: HOSPITAL | Age: 58
Discharge: HOME | End: 2018-08-11
Attending: INTERNAL MEDICINE
Payer: MEDICAID

## 2018-08-11 DIAGNOSIS — R92.2 DENSE BREAST: ICD-10-CM

## 2018-08-11 DIAGNOSIS — Z12.31 VISIT FOR SCREENING MAMMOGRAM: ICD-10-CM

## 2018-08-11 PROCEDURE — 77067 SCR MAMMO BI INCL CAD: CPT | Performed by: INTERNAL MEDICINE

## 2018-08-11 PROCEDURE — 77063 BREAST TOMOSYNTHESIS BI: CPT | Performed by: INTERNAL MEDICINE

## 2018-08-22 ENCOUNTER — OFFICE VISIT (OUTPATIENT)
Dept: GASTROENTEROLOGY | Facility: CLINIC | Age: 58
End: 2018-08-22
Payer: MEDICAID

## 2018-08-22 ENCOUNTER — TELEPHONE (OUTPATIENT)
Dept: GASTROENTEROLOGY | Facility: CLINIC | Age: 58
End: 2018-08-22

## 2018-08-22 VITALS
WEIGHT: 122.63 LBS | HEART RATE: 81 BPM | BODY MASS INDEX: 19.71 KG/M2 | SYSTOLIC BLOOD PRESSURE: 96 MMHG | DIASTOLIC BLOOD PRESSURE: 57 MMHG | HEIGHT: 66 IN

## 2018-08-22 DIAGNOSIS — R63.4 UNINTENTIONAL WEIGHT LOSS: ICD-10-CM

## 2018-08-22 DIAGNOSIS — Z12.11 COLON CANCER SCREENING: ICD-10-CM

## 2018-08-22 DIAGNOSIS — Z88.8 ALLERGY TO IODINE: ICD-10-CM

## 2018-08-22 DIAGNOSIS — Z12.11 ENCOUNTER FOR SCREENING COLONOSCOPY: Primary | ICD-10-CM

## 2018-08-22 DIAGNOSIS — Z80.0 FAMILY HISTORY OF COLON CANCER: ICD-10-CM

## 2018-08-22 DIAGNOSIS — R63.4 UNINTENDED WEIGHT LOSS: ICD-10-CM

## 2018-08-22 DIAGNOSIS — R11.0 NAUSEA: Primary | ICD-10-CM

## 2018-08-22 DIAGNOSIS — R63.4 UNINTENTIONAL WEIGHT LOSS: Primary | ICD-10-CM

## 2018-08-22 DIAGNOSIS — R11.0 NAUSEA: ICD-10-CM

## 2018-08-22 PROCEDURE — 99212 OFFICE O/P EST SF 10 MIN: CPT | Performed by: PHYSICIAN ASSISTANT

## 2018-08-22 PROCEDURE — 99244 OFF/OP CNSLTJ NEW/EST MOD 40: CPT | Performed by: PHYSICIAN ASSISTANT

## 2018-08-22 RX ORDER — POLYETHYLENE GLYCOL 3350, SODIUM CHLORIDE, SODIUM BICARBONATE, POTASSIUM CHLORIDE 420; 11.2; 5.72; 1.48 G/4L; G/4L; G/4L; G/4L
POWDER, FOR SOLUTION ORAL
Qty: 1 BOTTLE | Refills: 0 | Status: SHIPPED | OUTPATIENT
Start: 2018-08-22 | End: 2019-04-30

## 2018-08-22 RX ORDER — GABAPENTIN 800 MG/1
800 TABLET ORAL ONCE AS NEEDED
Refills: 1 | COMMUNITY
Start: 2018-08-03 | End: 2019-04-30

## 2018-08-22 RX ORDER — CLONAZEPAM 1 MG/1
1 TABLET ORAL DAILY
Refills: 0 | COMMUNITY
Start: 2018-08-21 | End: 2018-10-16 | Stop reason: ALTCHOICE

## 2018-08-22 NOTE — H&P
[facility], [facility] - Gastroenterology                                                                                                  Clinic History and Physical     Ramiro Tolbert anesthesia once but does not provide any more details with this. See above normal stress test. Patient states she needed clearance for lithotripsy.      Personal Hx Cancer(s):  - None    Surgical Hx:  - Appendectomy  - Tonsillectomy   - No known history of daily. Disp:  Rfl: 0   PEG 3350-KCl-Na Bicarb-NaCl (TRILYTE) 420 g Oral Recon Soln Split dose preparation - take as directed.  Disp: 1 Bottle Rfl: 0   METFORMIN HCL  MG Oral Tablet 24 Hr TAKE 2 TABLETS BY MOUTH EVERY 12 HOURS Disp: 360 tablet Rfl: 0 INTEGUMENT/BREAST:  SKIN:  negative for jaundice   ENDOCRINE:  negative for cold intolerance and heat intolerance  MUSCULOSKELETAL:  negative for joint effusion/severe erythema +R sided shoulder   BEHAVIOR/PSYCH:  negative for psychotic behavior +anxiety While medication s/e cannot be r/o, I would recommend bi-directional and CT A/P to r/o malignancy of the GI tract. She was advised to stop smoking. She provides vague abdominal pain history to me today which I tell her to keep an eye on if this returns.  ER Moira Peacock PA-C  8/22/2018

## 2018-08-22 NOTE — TELEPHONE ENCOUNTER
LMTCB. Please transfer to triage. Per verbal from [address] [place] [address] patient has had a recent CT somewhere else in the last 1 year, route message back to her.

## 2018-08-22 NOTE — TELEPHONE ENCOUNTER
Initiated prior auth for CT per Fe Kimball nurse at [email or web address] [phone] (Ref # [id number])  She will  need to send information to their physician for review and fax us a response. Patient contacted and advised of message above.  States she has had 3 CT scans r

## 2018-08-22 NOTE — TELEPHONE ENCOUNTER
Scheduled for:  Colonoscopy [address]  Provider Name:   Date:  11/20/18  Location:  [facility]  Sedation:  MAC  Time: 2272 / Arrival 0930   Prep:Split dose Colyte  Meds/Allergies Reconciled?:  Physician reviewed  Diagnosis with codes:  Colon Cancer Scr

## 2018-08-22 NOTE — TELEPHONE ENCOUNTER
I asked the patient if she has had CT scans recently and she replied \"I do not know\". I do not see any CT scans in the chart from this year. Please ask the patient.

## 2018-08-22 NOTE — PATIENT INSTRUCTIONS
1. Schedule upper endoscopy and colonoscopy with Dr. Isidoro Myers or Nany Hernández with MAC sedation at [facility] or [facility]. 2.  bowel prep from pharmacy - I have prescribed Trilyte split dose preparation.  This is a small

## 2018-08-23 RX ORDER — PREDNISONE 50 MG/1
TABLET ORAL
Qty: 3 TABLET | Refills: 0 | Status: SHIPPED | OUTPATIENT
Start: 2018-08-23 | End: 2019-04-30

## 2018-08-23 NOTE — TELEPHONE ENCOUNTER
Forwarded to Gusto PA--pt transferred from phone room. I confirmed with pt that she was referring to the 11/21/16 CT kidney and the 2/25/17 IVP. She had not remembered dates before. Please note no contrast with kidney CT or IVP.  States her previous reactio

## 2018-08-24 NOTE — TELEPHONE ENCOUNTER
Spoke to [employer] at [facility] for PA status on CT- states it is still under medical review at this time.  -Awaiting insurance decision at this time.

## 2018-08-24 NOTE — TELEPHONE ENCOUNTER
Thank you for updating allergies - as per the OV chart, I reviewed allergies with her, as did the MA. Please verify if the patient has any remaining allergies.     I placed CT A/P with the radiology recommendations for dosing for contrast allergies - predni

## 2018-08-27 ENCOUNTER — TELEPHONE (OUTPATIENT)
Dept: INTERNAL MEDICINE CLINIC | Facility: CLINIC | Age: 58
End: 2018-08-27

## 2018-08-27 NOTE — TELEPHONE ENCOUNTER
Med not on patient's profile. Leah Bui, pharmacist: Dewayne Jose and pharmacist states they did not send any requests for Vitamin D. Sadi Allen phone room advised that fax request from [facility] states patient requested medication from pharmacy.

## 2018-08-28 NOTE — TELEPHONE ENCOUNTER
CT not authorized per insurance. Patient needs additional testing for weight loss per Dana FONG. Waiting for denial letter to be faxed to us.

## 2018-08-29 NOTE — TELEPHONE ENCOUNTER
When the letter is received, I will do a letter of appeal or [id number]. No further action on my behalf currently.

## 2018-08-29 NOTE — TELEPHONE ENCOUNTER
URIEL-   Still waiting for a denial letter. I spoke with [place] from [place] and she stated she will send another  request to have it faxed again. Fax number verified.

## 2018-08-29 NOTE — TELEPHONE ENCOUNTER
Per pharmacy waiting for the rx med of pt, told pharmacy that waiting for the pt call and they understood.

## 2018-09-01 RX ORDER — ONDANSETRON 4 MG/1
TABLET, ORALLY DISINTEGRATING ORAL
Qty: 10 TABLET | Refills: 2 | Status: SHIPPED | OUTPATIENT
Start: 2018-09-01 | End: 2020-12-03

## 2018-09-01 NOTE — TELEPHONE ENCOUNTER
Please review and advise regarding pended refill request as unclear if this is meant to be a chronic med.     Last Rx= 7/16/18    Recent Outpatient Visits            1 week ago Encounter for screening colonoscopy    [facility], [facility], [address]

## 2018-09-04 NOTE — TELEPHONE ENCOUNTER
Routed to [facility]- did you receive denial letter? Mckay Hay RN has requested twice and we have not yet received. If you received it, please fax to  at [phone], please and thank you.

## 2018-09-06 NOTE — TELEPHONE ENCOUNTER
Rachel Shaikh   You 23 hours ago (9:36 AM)      PA WAS INITIATED BY Carito Byrd RN NOT MANAGED CARE  (Routing comment)

## 2018-09-06 NOTE — TELEPHONE ENCOUNTER
Spoke to Bethany at [place] and states that have faxed the appeal letter several times to the number provided to them when the PA was initiated (they had phone:[phone] and fax:[phone]), I reviewed that both numbers were incorrect.      She states

## 2018-09-11 NOTE — TELEPHONE ENCOUNTER
Pt contacted and reviewed PB message below, she verbalized understanding of all and she scheduled the following f/u appt, directions provided to the [address], and told to arrive 15 mins earlier:  Future Appointments   Date Time Provider Rosalie Kennedy   9/2

## 2018-09-11 NOTE — TELEPHONE ENCOUNTER
Please make a non-urgent follow up appointment at the end of September for the patient to see me in office. It would be easier for us to discuss our options in this way.

## 2018-09-21 ENCOUNTER — TELEPHONE (OUTPATIENT)
Dept: GASTROENTEROLOGY | Facility: CLINIC | Age: 58
End: 2018-09-21

## 2018-09-21 NOTE — TELEPHONE ENCOUNTER
Called Pt to remind her about her Order for CT SCAN of the abdomen on 8/22/18 by Eleni Cline PA-C . Mailed letter to patient notifying her of overdue labs CT Scan of the Abdomen Ordered 08/22/18 and due 09/21/18. Overdue letter mailed to patient.

## 2018-10-16 ENCOUNTER — OFFICE VISIT (OUTPATIENT)
Dept: INTERNAL MEDICINE CLINIC | Facility: CLINIC | Age: 58
End: 2018-10-16
Payer: MEDICAID

## 2018-10-16 VITALS
DIASTOLIC BLOOD PRESSURE: 61 MMHG | TEMPERATURE: 99 F | HEIGHT: 66 IN | HEART RATE: 65 BPM | BODY MASS INDEX: 19.74 KG/M2 | WEIGHT: 122.81 LBS | SYSTOLIC BLOOD PRESSURE: 96 MMHG

## 2018-10-16 DIAGNOSIS — J02.9 SORE THROAT: ICD-10-CM

## 2018-10-16 DIAGNOSIS — R80.9 TYPE 2 DIABETES MELLITUS WITH MICROALBUMINURIA, WITHOUT LONG-TERM CURRENT USE OF INSULIN (HCC): ICD-10-CM

## 2018-10-16 DIAGNOSIS — H61.23 CERUMEN DEBRIS ON TYMPANIC MEMBRANE OF BOTH EARS: ICD-10-CM

## 2018-10-16 DIAGNOSIS — G47.00 INSOMNIA, UNSPECIFIED TYPE: Primary | ICD-10-CM

## 2018-10-16 DIAGNOSIS — R63.4 WEIGHT LOSS: ICD-10-CM

## 2018-10-16 DIAGNOSIS — E11.29 TYPE 2 DIABETES MELLITUS WITH MICROALBUMINURIA, WITHOUT LONG-TERM CURRENT USE OF INSULIN (HCC): ICD-10-CM

## 2018-10-16 PROBLEM — H52.4 PRESBYOPIA: Status: RESOLVED | Noted: 2018-06-29 | Resolved: 2018-10-16

## 2018-10-16 PROCEDURE — 99212 OFFICE O/P EST SF 10 MIN: CPT | Performed by: INTERNAL MEDICINE

## 2018-10-16 PROCEDURE — 99214 OFFICE O/P EST MOD 30 MIN: CPT | Performed by: INTERNAL MEDICINE

## 2018-10-16 RX ORDER — TRAZODONE HYDROCHLORIDE 50 MG/1
TABLET ORAL
Qty: 30 TABLET | Refills: 1 | Status: SHIPPED | OUTPATIENT
Start: 2018-10-16 | End: 2019-04-30

## 2018-10-16 NOTE — PROGRESS NOTES
HPI:    Patient ID: Sai Patient is a 59year old female. Pt c/o left side of her throat is sore. Her left ear is plugged. Her nose is running. Her right arm is very painful with decreased ROM.   She was taken off Klonapin because she is on oxycodone DiphenhydrAMINE HCl 50 MG Oral Tab Take one (1) 50mg tablet by mouth one (1) hour before the examination. Disp: 1 tablet Rfl: 0   gabapentin 800 MG Oral Tab Take 800 mg by mouth once as needed.  Disp:  Rfl: 1   METFORMIN HCL  MG Oral Tablet 24 Hr TA Tobacco Use      Smoking status: Former Smoker        Packs/day: 1.00        Years: 45.00        Pack years: 39        Types: Cigarettes        Quit date: 2012        Years since quittin.8      Smokeless tobacco: Never Used    Alcohol use:  No although she does not have diarrhea. She saw the eye doctor. She is taking low dose Crestor and is due for another lipid panel. F/u in 6 weeks. Unprioritized    Weight loss     Her weight has been stable for 3 months.          Insomnia - [place]

## 2018-10-16 NOTE — PATIENT INSTRUCTIONS
Use Debrox ear drops for 2 weeks. Come back for an earwash if your left ear is still plugged. Do fasting labs soon. Fast for 12 hours. Water is okay. Walk in.

## 2018-10-17 RX ORDER — FLUOXETINE HYDROCHLORIDE 20 MG/1
CAPSULE ORAL
Qty: 90 CAPSULE | Refills: 0 | Status: SHIPPED | OUTPATIENT
Start: 2018-10-17 | End: 2018-11-15

## 2018-10-17 NOTE — TELEPHONE ENCOUNTER
Refill Protocol Appointment Criteria  · Appointment scheduled in the past 6 months or in the next 3 months  Recent Outpatient Visits            Yesterday Insomnia, unspecified type    Lazarus Ranies MD    Office Visit

## 2018-10-17 NOTE — ASSESSMENT & PLAN NOTE
Her last A1C was 6.9  She has been losing weight and she thinks it is due to the metformin, although she does not have diarrhea. She saw the eye doctor. She is taking low dose Crestor and is due for another lipid panel. F/u in 6 weeks.

## 2018-10-18 ENCOUNTER — OFFICE VISIT (OUTPATIENT)
Dept: OBGYN CLINIC | Facility: CLINIC | Age: 58
End: 2018-10-18
Payer: MEDICAID

## 2018-10-18 VITALS
BODY MASS INDEX: 19.44 KG/M2 | HEIGHT: 66 IN | DIASTOLIC BLOOD PRESSURE: 82 MMHG | SYSTOLIC BLOOD PRESSURE: 118 MMHG | WEIGHT: 121 LBS

## 2018-10-18 DIAGNOSIS — Z01.419 WELL WOMAN EXAM WITH ROUTINE GYNECOLOGICAL EXAM: Primary | ICD-10-CM

## 2018-10-18 PROCEDURE — 99386 PREV VISIT NEW AGE 40-64: CPT | Performed by: OBSTETRICS & GYNECOLOGY

## 2018-10-18 RX ORDER — CEPHALEXIN 500 MG/1
CAPSULE ORAL
Refills: 1 | COMMUNITY
Start: 2018-08-23 | End: 2018-10-18

## 2018-10-18 NOTE — PROGRESS NOTES
HPI:   Trinity Dias is a 59year old female who presents for an annual/pap.        Wt Readings from Last 6 Encounters:  10/18/18 : 121 lb (54.9 kg)  10/16/18 : 122 lb 12.8 oz (55.7 kg)  08/22/18 : 122 lb 9.6 oz (55.6 kg)  07/16/18 : 122 lb 6.4 oz (55.5 kg) Calcium (CRESTOR) 5 MG Oral Tab One half tab on Monday, Wednesday, and Friday.  (Patient taking differently: Pt takes one whole tab two times a week ) Disp: 15 tablet Rfl: 5   Alcohol Swabs (BD SWAB SINGLE USE REGULAR) Does not apply Pads  Disp:  Rfl:    Bl SYSTEMS:   GENERAL: feels well otherwise  SKIN: denies any unusual skin lesions  EYES:denies blurred vision or double vision  HEENT: denies nasal congestion, sinus pain or ST  LUNGS: denies shortness of breath with exertion  CARDIOVASCULAR: denies chest pa

## 2018-10-25 ENCOUNTER — OFFICE VISIT (OUTPATIENT)
Dept: GASTROENTEROLOGY | Facility: CLINIC | Age: 58
End: 2018-10-25
Payer: MEDICAID

## 2018-10-25 VITALS
WEIGHT: 122 LBS | BODY MASS INDEX: 19.61 KG/M2 | HEIGHT: 66 IN | SYSTOLIC BLOOD PRESSURE: 94 MMHG | DIASTOLIC BLOOD PRESSURE: 44 MMHG | HEART RATE: 68 BPM

## 2018-10-25 DIAGNOSIS — R10.9 ABDOMINAL DISCOMFORT: ICD-10-CM

## 2018-10-25 DIAGNOSIS — Z80.0 FAMILY HISTORY OF COLON CANCER: ICD-10-CM

## 2018-10-25 DIAGNOSIS — Z09 FOLLOW UP: Primary | ICD-10-CM

## 2018-10-25 DIAGNOSIS — R63.4 UNINTENTIONAL WEIGHT LOSS: ICD-10-CM

## 2018-10-25 PROCEDURE — 99214 OFFICE O/P EST MOD 30 MIN: CPT | Performed by: PHYSICIAN ASSISTANT

## 2018-10-25 PROCEDURE — 99212 OFFICE O/P EST SF 10 MIN: CPT | Performed by: PHYSICIAN ASSISTANT

## 2018-10-25 NOTE — PROGRESS NOTES
[facility] Follow-up Visit    Meme eats less as she has to watch her diet for DM. States always tired. No recent blood loss (6 months). Last blood loss was hematuria due to kidney stone. She denies changes in bowels, diarrhea, significant constipation, rectal bleeding or melena.  When asked • Colon Cancer Maternal Grandmother    • Other (Kidney Cancer) Maternal Grandmother    • Colon Cancer Other         Nephew   • Heart Disease Mother         Details unknown   • Hypertension Brother    • Colon Polyps Brother    • Colon Cancer Cousin    • O Glucose Monitoring Suppl Supplies Does not apply Misc Please supply patient with glucose meter, test strips and lancets covered by her insurance, test TID Disp: 1 kit Rfl: 0   Rosuvastatin Calcium (CRESTOR) 5 MG Oral Tab One half tab on Monday, Wednesday, comfortable and in no acute discomfort  HEENT: conjunctiva pink, the sclera appears anicteric  CV: regular rate and rhythm  Lung: moves air well; no labored breathing  Abdomen: soft, flat, non-tender, non-distended abdomen  +BS   Skin: dry, warm, no jaundi not wish to pursue imaging such as US - would like to wait until bi-directional is completed.      Recommend:  - Talk to Dr. Lion Simpson about smoking cessation   - Bi-directional confirmed with Dr. Darcy Guzman - instructions re-given  - Orders per 8/22  - Food/symp

## 2018-10-25 NOTE — PATIENT INSTRUCTIONS
1. Until the CT Scan, do NOT take prednisone or diphenhydramine (benadryl) - these were only prescribed due to the contrast allergy for the CT scan. 2. We will give you preparation instructions again for your procedures(s).     3. Pending the procedures,

## 2018-11-04 RX ORDER — ERGOCALCIFEROL 1.25 MG/1
CAPSULE ORAL
Qty: 4 CAPSULE | Refills: 3 | Status: SHIPPED | OUTPATIENT
Start: 2018-11-04 | End: 2019-02-23

## 2018-11-14 ENCOUNTER — APPOINTMENT (OUTPATIENT)
Dept: LAB | Facility: HOSPITAL | Age: 58
End: 2018-11-14
Attending: NURSE PRACTITIONER
Payer: MEDICAID

## 2018-11-14 ENCOUNTER — TELEPHONE (OUTPATIENT)
Dept: INTERNAL MEDICINE CLINIC | Facility: CLINIC | Age: 58
End: 2018-11-14

## 2018-11-14 ENCOUNTER — HOSPITAL ENCOUNTER (OUTPATIENT)
Dept: GENERAL RADIOLOGY | Facility: HOSPITAL | Age: 58
Discharge: HOME | End: 2018-11-14
Attending: NURSE PRACTITIONER
Payer: MEDICAID

## 2018-11-14 DIAGNOSIS — E11.9 TYPE 2 DIABETES MELLITUS WITHOUT COMPLICATION, WITHOUT LONG-TERM CURRENT USE OF INSULIN (HCC): ICD-10-CM

## 2018-11-14 DIAGNOSIS — M25.511 PAIN IN RIGHT SHOULDER: ICD-10-CM

## 2018-11-14 DIAGNOSIS — E78.00 HYPERCHOLESTEREMIA: ICD-10-CM

## 2018-11-14 PROCEDURE — 73010 X-RAY EXAM OF SHOULDER BLADE: CPT | Performed by: NURSE PRACTITIONER

## 2018-11-14 PROCEDURE — 36415 COLL VENOUS BLD VENIPUNCTURE: CPT

## 2018-11-14 PROCEDURE — 83036 HEMOGLOBIN GLYCOSYLATED A1C: CPT

## 2018-11-14 PROCEDURE — 84450 TRANSFERASE (AST) (SGOT): CPT

## 2018-11-14 PROCEDURE — 80061 LIPID PANEL: CPT

## 2018-11-14 PROCEDURE — 84460 ALANINE AMINO (ALT) (SGPT): CPT

## 2018-11-15 ENCOUNTER — TELEPHONE (OUTPATIENT)
Dept: GASTROENTEROLOGY | Facility: CLINIC | Age: 58
End: 2018-11-15

## 2018-11-15 RX ORDER — LEVOTHYROXINE SODIUM 0.07 MG/1
TABLET ORAL
Qty: 60 TABLET | Refills: 2 | Status: SHIPPED | OUTPATIENT
Start: 2018-11-15 | End: 2019-05-13

## 2018-11-15 RX ORDER — METFORMIN HYDROCHLORIDE 500 MG/1
TABLET, EXTENDED RELEASE ORAL
Qty: 120 TABLET | Refills: 2 | Status: SHIPPED | OUTPATIENT
Start: 2018-11-15 | End: 2019-03-05

## 2018-11-15 RX ORDER — FLUOXETINE HYDROCHLORIDE 20 MG/1
CAPSULE ORAL
Qty: 90 CAPSULE | Refills: 0 | Status: SHIPPED | OUTPATIENT
Start: 2018-11-15 | End: 2018-12-12

## 2018-11-15 NOTE — TELEPHONE ENCOUNTER
Advised patient of Dr. Stearns Best note. Patient verbalized understanding.       Srikanth Chatman MD   Em Rn Triage 3 hours ago (4:19 PM)      Non emergent issue and a culture is not the way to check for protein in the urine, I forwarded the message to the [place]

## 2018-11-15 NOTE — TELEPHONE ENCOUNTER
Records received not pertinent to patient's GI work up. CT for stone protocol - not abd/pelvis - no acute abd process other than partially obstructing stone in R ureter (wherein patient underwent cystoscopy).  Remaining records re: cystoscopy not reviewe

## 2018-11-15 NOTE — TELEPHONE ENCOUNTER
Refill passed per [facility] protocol.   Hypothyroid Medications  Protocol Criteria:  Appointment scheduled in the past 12 months or the next 3 months  TSH resulted in the past 12 months that is normal  Recent Outpatient Visits            3 weeks ago [address] [place], [place]    Office Visit    4 months ago Type 2 diabetes mellitus without complication, without long-term current use of insulin [facility])    Angelica Thakkar, [address] [place] [address], Kirsten Felix MD    Office Visit        Future Appointments

## 2018-11-20 ENCOUNTER — ANESTHESIA (OUTPATIENT)
Dept: ENDOSCOPY | Facility: HOSPITAL | Age: 58
End: 2018-11-20
Payer: MEDICAID

## 2018-11-20 ENCOUNTER — ANESTHESIA EVENT (OUTPATIENT)
Dept: ENDOSCOPY | Facility: HOSPITAL | Age: 58
End: 2018-11-20
Payer: MEDICAID

## 2018-11-20 ENCOUNTER — HOSPITAL ENCOUNTER (OUTPATIENT)
Facility: HOSPITAL | Age: 58
Setting detail: HOSPITAL OUTPATIENT SURGERY
Discharge: HOME | End: 2018-11-20
Attending: INTERNAL MEDICINE | Admitting: INTERNAL MEDICINE
Payer: MEDICAID

## 2018-11-20 DIAGNOSIS — R63.4 UNINTENDED WEIGHT LOSS: ICD-10-CM

## 2018-11-20 DIAGNOSIS — R11.0 NAUSEA: ICD-10-CM

## 2018-11-20 DIAGNOSIS — Z80.0 FAMILY HISTORY OF COLON CANCER: ICD-10-CM

## 2018-11-20 DIAGNOSIS — Z12.11 COLON CANCER SCREENING: ICD-10-CM

## 2018-11-20 PROCEDURE — 0DB98ZX EXCISION OF DUODENUM, VIA NATURAL OR ARTIFICIAL OPENING ENDOSCOPIC, DIAGNOSTIC: ICD-10-PCS | Performed by: INTERNAL MEDICINE

## 2018-11-20 PROCEDURE — 0DB68ZX EXCISION OF STOMACH, VIA NATURAL OR ARTIFICIAL OPENING ENDOSCOPIC, DIAGNOSTIC: ICD-10-PCS | Performed by: INTERNAL MEDICINE

## 2018-11-20 PROCEDURE — 43239 EGD BIOPSY SINGLE/MULTIPLE: CPT | Performed by: INTERNAL MEDICINE

## 2018-11-20 PROCEDURE — 0DJD8ZZ INSPECTION OF LOWER INTESTINAL TRACT, VIA NATURAL OR ARTIFICIAL OPENING ENDOSCOPIC: ICD-10-PCS | Performed by: INTERNAL MEDICINE

## 2018-11-20 PROCEDURE — 45378 DIAGNOSTIC COLONOSCOPY: CPT | Performed by: INTERNAL MEDICINE

## 2018-11-20 RX ORDER — DEXTROSE MONOHYDRATE 25 G/50ML
50 INJECTION, SOLUTION INTRAVENOUS
Status: DISCONTINUED | OUTPATIENT
Start: 2018-11-20 | End: 2018-11-20

## 2018-11-20 RX ORDER — ONDANSETRON 2 MG/ML
INJECTION INTRAMUSCULAR; INTRAVENOUS AS NEEDED
Status: DISCONTINUED | OUTPATIENT
Start: 2018-11-20 | End: 2018-11-20 | Stop reason: SURG

## 2018-11-20 RX ORDER — SODIUM CHLORIDE, SODIUM LACTATE, POTASSIUM CHLORIDE, CALCIUM CHLORIDE 600; 310; 30; 20 MG/100ML; MG/100ML; MG/100ML; MG/100ML
INJECTION, SOLUTION INTRAVENOUS CONTINUOUS
Status: DISCONTINUED | OUTPATIENT
Start: 2018-11-20 | End: 2018-11-20

## 2018-11-20 RX ORDER — NALOXONE HYDROCHLORIDE 0.4 MG/ML
80 INJECTION, SOLUTION INTRAMUSCULAR; INTRAVENOUS; SUBCUTANEOUS AS NEEDED
Status: DISCONTINUED | OUTPATIENT
Start: 2018-11-20 | End: 2018-11-20

## 2018-11-20 RX ORDER — DEXAMETHASONE SODIUM PHOSPHATE 4 MG/ML
VIAL (ML) INJECTION AS NEEDED
Status: DISCONTINUED | OUTPATIENT
Start: 2018-11-20 | End: 2018-11-20 | Stop reason: SURG

## 2018-11-20 RX ADMIN — SODIUM CHLORIDE, SODIUM LACTATE, POTASSIUM CHLORIDE, CALCIUM CHLORIDE: 600; 310; 30; 20 INJECTION, SOLUTION INTRAVENOUS at 10:57:00

## 2018-11-20 RX ADMIN — ONDANSETRON 4 MG: 2 INJECTION INTRAMUSCULAR; INTRAVENOUS at 11:10:00

## 2018-11-20 RX ADMIN — DEXAMETHASONE SODIUM PHOSPHATE 4 MG: 4 MG/ML VIAL (ML) INJECTION at 11:10:00

## 2018-11-20 NOTE — ANESTHESIA POSTPROCEDURE EVALUATION
Patient: Robi Urrutia    Procedure Summary     Date:  11/20/18 Room / Location:  [address] [place] [address] ENDOSCOPY 05 / [address] [place] [address] ENDOSCOPY    Anesthesia Start:  4136 Anesthesia Stop:      Procedures:       COLONOSCOPY (N/A )      ESOPHAGOGASTRODUODENOSCOPY (EGD) (N/A ) Diagnosis:

## 2018-11-20 NOTE — OPERATIVE REPORT
EGD AND COLONOSCOPY PROCEDURE REPORTS:    DATE OF PROCEDURE:  11/20/2018    PCP: Shea Leos MD     PREOPERATIVE DIAGNOSIS:  Nausea, abnormal weight loss, screening colonoscopy     POSTOPERATIVE DIAGNOSIS:  See impression. SURGEON:  Juan FONG including appendiceal orifice, cecal strap, ileocecal valve. Retroflexion was performed in the rectum. The quality of the prep was good.      COLONOSCOPY FINDINGS:  · Small internal hemorrhoids only on colonoscopy examination to the cecum and terminal i

## 2018-11-20 NOTE — ANESTHESIA PREPROCEDURE EVALUATION
Anesthesia PreOp Note    HPI:     Walt Young is a 59year old female who presents for preoperative consultation requested by: Jeff Corley MD    Date of Surgery: 11/20/2018    Procedure(s):  COLONOSCOPY  ESOPHAGOGASTRODUODENOSCOPY (EGD)  In • LITHOTRIPSY     • TONSILLECTOMY           Medications Prior to Admission:  LEVOTHYROXINE SODIUM 75 MCG Oral Tab TAKE ONE TABLET BY MOUTH ONCE DAILY Disp: 60 tablet Rfl: 2 11/18/2018   METFORMIN HCL  MG Oral Tablet 24 Hr TAKE TWO TABLETS BY MOUTH (Patient taking differently: Pt takes one whole tab two times a week ) Disp: 15 tablet Rfl: 5 Taking   Alcohol Swabs (BD SWAB SINGLE USE REGULAR) Does not apply Pads  Disp:  Rfl:  Taking   Blood Glucose Monitoring Suppl (TRUE METRIX METER) W/DEVICE Does no Smoking status: Former Smoker        Packs/day: 1.00        Years: 45.00        Pack years: 39        Types: Cigarettes        Quit date: 2018        Years since quittin.0      Smokeless tobacco: Never Used      Tobacco comment: Quit Monday    Davis Abdominal  - normal exam    Abdomen: soft.              Anesthesia Plan:   ASA:  2  Plan:   MAC  Post-op Pain Management: IV analgesics  Informed Consent Plan and Risks Discussed With:  Patient      I have informed Rachel Dais and/or legal guardian or fam

## 2018-11-20 NOTE — H&P
History & Physical Examination    Patient Name: Brittany Valdez  MRN: [id number]  CSN: [id number]  YOB: 1960    Diagnosis: Nausea, abnormal weight loss, screening colonoscopy     Present Illness: As above; see recent office consultation and foll Does not apply Misc Please supply patient with glucose meter, test strips and lancets covered by her insurance, test TID Disp: 1 kit Rfl: 0 Taking   Rosuvastatin Calcium (CRESTOR) 5 MG Oral Tab One half tab on Monday, Wednesday, and Friday.  (Patient taking Past Surgical History:   Procedure Laterality Date   • APPENDECTOMY     • LITHOTRIPSY     • TONSILLECTOMY       Family History   Problem Relation Age of Onset   • Heart Disease Father         Details unknown   • Hypertension Father    • Colon Cancer Ma

## 2018-11-21 VITALS
HEIGHT: 66.5 IN | DIASTOLIC BLOOD PRESSURE: 61 MMHG | RESPIRATION RATE: 16 BRPM | BODY MASS INDEX: 19.06 KG/M2 | HEART RATE: 65 BPM | OXYGEN SATURATION: 99 % | WEIGHT: 120 LBS | SYSTOLIC BLOOD PRESSURE: 113 MMHG

## 2018-11-23 ENCOUNTER — HOSPITAL ENCOUNTER (EMERGENCY)
Facility: HOSPITAL | Age: 58
Discharge: HOME | End: 2018-11-24
Attending: EMERGENCY MEDICINE
Payer: MEDICAID

## 2018-11-23 DIAGNOSIS — R73.9 HYPERGLYCEMIA: ICD-10-CM

## 2018-11-23 DIAGNOSIS — R42 DIZZINESS: Primary | ICD-10-CM

## 2018-11-23 PROCEDURE — 85025 COMPLETE CBC W/AUTO DIFF WBC: CPT | Performed by: EMERGENCY MEDICINE

## 2018-11-23 PROCEDURE — 99285 EMERGENCY DEPT VISIT HI MDM: CPT

## 2018-11-23 PROCEDURE — 83735 ASSAY OF MAGNESIUM: CPT | Performed by: EMERGENCY MEDICINE

## 2018-11-23 PROCEDURE — 81001 URINALYSIS AUTO W/SCOPE: CPT | Performed by: EMERGENCY MEDICINE

## 2018-11-23 PROCEDURE — 96374 THER/PROPH/DIAG INJ IV PUSH: CPT

## 2018-11-23 PROCEDURE — 80048 BASIC METABOLIC PNL TOTAL CA: CPT | Performed by: EMERGENCY MEDICINE

## 2018-11-23 RX ORDER — LORAZEPAM 0.5 MG/1
0.5 TABLET ORAL EVERY 4 HOURS PRN
COMMUNITY
End: 2021-03-09 | Stop reason: ALTCHOICE

## 2018-11-24 ENCOUNTER — APPOINTMENT (OUTPATIENT)
Dept: MRI IMAGING | Facility: HOSPITAL | Age: 58
End: 2018-11-24
Attending: EMERGENCY MEDICINE
Payer: MEDICAID

## 2018-11-24 ENCOUNTER — APPOINTMENT (OUTPATIENT)
Dept: CT IMAGING | Facility: HOSPITAL | Age: 58
End: 2018-11-24
Attending: EMERGENCY MEDICINE
Payer: MEDICAID

## 2018-11-24 VITALS
RESPIRATION RATE: 17 BRPM | TEMPERATURE: 98 F | DIASTOLIC BLOOD PRESSURE: 56 MMHG | SYSTOLIC BLOOD PRESSURE: 114 MMHG | BODY MASS INDEX: 18.96 KG/M2 | WEIGHT: 118 LBS | HEART RATE: 60 BPM | HEIGHT: 66 IN | OXYGEN SATURATION: 96 %

## 2018-11-24 PROCEDURE — 70551 MRI BRAIN STEM W/O DYE: CPT | Performed by: EMERGENCY MEDICINE

## 2018-11-24 PROCEDURE — 70450 CT HEAD/BRAIN W/O DYE: CPT | Performed by: EMERGENCY MEDICINE

## 2018-11-24 RX ORDER — ONDANSETRON 2 MG/ML
4 INJECTION INTRAMUSCULAR; INTRAVENOUS ONCE
Status: COMPLETED | OUTPATIENT
Start: 2018-11-24 | End: 2018-11-24

## 2018-11-24 NOTE — ED INITIAL ASSESSMENT (HPI)
States that she ate 5 truffles and states that afterwards she \"felt funny. \" patient reports that she is a diabetic.

## 2018-11-24 NOTE — ED PROVIDER NOTES
Patient Seen in: [facility] AND [facility] Emergency Department    History   Patient presents with:  Hyperglycemia (metabolic)    Stated Complaint: hyperglycemia; thinks she passed out but last sugar 235; patient seems saying i*    HPI    49-year-old female with HPI.  Constitutional and vital signs reviewed. All other systems reviewed and negative except as noted above.     Physical Exam     ED Triage Vitals [11/23/18 7777]   /48   Pulse 81   Resp 16   Temp 98.2 °F (36.8 °C)   Temp src Oral   SpO2 98 % components within normal limits   CBC W/ DIFFERENTIAL - Abnormal; Notable for the following components:    RDW 15.3 (*)     MPV 7.0 (*)     Neutrophil Absolute 1.5 (*)     All other components within normal limits   MAGNESIUM - Normal   CBC WITH DIFFERENTI encounter diagnosis)  Hyperglycemia    Disposition:  Discharge  11/24/2018  2:51 am    Follow-up:  Lenora Romeo MD  [facility]  [phone]    Schedule an appointment as soon as possible for a visit in 2 days          Aaliyah Terry

## 2018-12-03 ENCOUNTER — TELEPHONE (OUTPATIENT)
Dept: GASTROENTEROLOGY | Facility: CLINIC | Age: 58
End: 2018-12-03

## 2018-12-03 NOTE — TELEPHONE ENCOUNTER
I mailed out colonoscopy results letter to pt  Updated health maintenance  Entered into 5 yr recall  Recall colon in 5 years per. Colon done 11/20/18    GI RNs - 1.  Please print and mail this letter to patient; 2. Recall for colonoscopy exam in 5 years

## 2018-12-12 RX ORDER — FLUOXETINE HYDROCHLORIDE 20 MG/1
CAPSULE ORAL
Qty: 90 CAPSULE | Refills: 0 | Status: SHIPPED | OUTPATIENT
Start: 2018-12-12 | End: 2019-01-09

## 2018-12-13 NOTE — TELEPHONE ENCOUNTER
Refill passed per [facility] protocol.   Refill Protocol Appointment Criteria  · Appointment scheduled in the past 6 months or in the next 3 months  Recent Outpatient Visits            1 month ago Follow up    [facility], [facility]

## 2019-01-03 ENCOUNTER — OFFICE VISIT (OUTPATIENT)
Dept: GASTROENTEROLOGY | Facility: CLINIC | Age: 59
End: 2019-01-03
Payer: MEDICAID

## 2019-01-03 VITALS
HEART RATE: 82 BPM | HEIGHT: 66 IN | DIASTOLIC BLOOD PRESSURE: 54 MMHG | SYSTOLIC BLOOD PRESSURE: 98 MMHG | BODY MASS INDEX: 19.71 KG/M2 | WEIGHT: 122.63 LBS

## 2019-01-03 DIAGNOSIS — R11.0 CHRONIC NAUSEA: Primary | ICD-10-CM

## 2019-01-03 DIAGNOSIS — Z09 FOLLOW UP: ICD-10-CM

## 2019-01-03 PROCEDURE — 99214 OFFICE O/P EST MOD 30 MIN: CPT | Performed by: PHYSICIAN ASSISTANT

## 2019-01-03 PROCEDURE — 99212 OFFICE O/P EST SF 10 MIN: CPT | Performed by: PHYSICIAN ASSISTANT

## 2019-01-03 RX ORDER — PROCHLORPERAZINE MALEATE 5 MG/1
5 TABLET ORAL NIGHTLY PRN
Qty: 30 TABLET | Refills: 1 | Status: SHIPPED | OUTPATIENT
Start: 2019-01-03 | End: 2019-02-25

## 2019-01-03 NOTE — PATIENT INSTRUCTIONS
1. Try Compazine 5 mg each night. This medication may make you tired.  Please therein make an appointment to see Dr. Ankur Evans in 8-12 weeks to review this medication and he may suggest you try Reglan or alternate between the two.    2. Please discontinue Zofr

## 2019-01-03 NOTE — PROGRESS NOTES
[facility] Follow-up Visit    Meme intestinal/duodenal mucosa demonstrating unremarkable features including preservation of the villous and glandular architecture.   · No evidence of viral inclusions, epithelioid granulomas, active/acute duodenitis, increased intraepithelial lymphocytes, aaron in her health - scheduled for bi-directional November with Dr. Chidi Snider (8/22/18):     She was at 154 lbs in her 46s - within 8 years she has consistently been decreasing her weight.  She notes that this started shortly after Metformin initiation, (chronic obstructive pulmonary disease) (HCC)    • Diabetes ([facility] [address]    • Disorder of thyroid    • High cholesterol    • Kidney stones    • PONV (postoperative nausea and vomiting)    • Rotator cuff impingement syndrome of right shoulder 9/12/2017      Past S Rfl: 2   ERGOCALCIFEROL 18368 units Oral Cap TAKE ONE CAPSULE BY MOUTH EVERY WEEK Disp: 4 capsule Rfl: 3   VENTOLIN  (90 Base) MCG/ACT Inhalation Aero Soln INL 2 PUFFS PO Q 4 H PRN Disp:  Rfl: 5   ONDANSETRON 4 MG Oral Tablet Dispersible DISSOLVE 1 tablet Rfl: 5       Allergies:    Radiology Contrast *    HIVES, SWELLING, SHORTNESS OF                            BREATH    Comment:Had some kind of contrast many years ago with an             x-ray.   Statins                 Myopathy  Other brother's son (nephew) diagnosed at age 44, maternal grandmother dx in mid-55s and a cousin who passed away from [facility]. Normal stress test December 2016. CT A/P denied by insurance - per last OV if sxs continued, we would consider re-ordering CT A/P.  No anem Compazine is working   - Consideration for Gastric Emptying Scan   - ER s/sxs discussed  - Discontinue Zofran, begin Compazine per above  - See above    Orders This Visit:  No orders of the defined types were placed in this encounter.       Meds This Visit:

## 2019-01-09 RX ORDER — FLUOXETINE HYDROCHLORIDE 20 MG/1
CAPSULE ORAL
Qty: 90 CAPSULE | Refills: 0 | Status: SHIPPED | OUTPATIENT
Start: 2019-01-09 | End: 2019-02-05

## 2019-01-10 NOTE — TELEPHONE ENCOUNTER
Refill passed per [facility] protocol.   Refill Protocol Appointment Criteria  · Appointment scheduled in the past 6 months or in the next 3 months  Recent Outpatient Visits            6 days ago Chronic nausea    [facility], [address] [place] [address]

## 2019-02-05 RX ORDER — FLUOXETINE HYDROCHLORIDE 20 MG/1
CAPSULE ORAL
Qty: 60 CAPSULE | Refills: 1 | Status: SHIPPED | OUTPATIENT
Start: 2019-02-05 | End: 2019-04-01

## 2019-02-13 ENCOUNTER — TELEPHONE (OUTPATIENT)
Dept: GASTROENTEROLOGY | Facility: CLINIC | Age: 59
End: 2019-02-13

## 2019-02-13 NOTE — TELEPHONE ENCOUNTER
Reminder letter mailed to Patient for her Overdue labs order for the following:    [facility] GI GASTRIC EMPTYING STUDY -- Order date 1/3/19  CT SCAN ABDOMEN + PELVIS ----Order date 8/23/19

## 2019-02-24 RX ORDER — ERGOCALCIFEROL 1.25 MG/1
CAPSULE ORAL
Qty: 4 CAPSULE | Refills: 1 | Status: SHIPPED | OUTPATIENT
Start: 2019-02-24 | End: 2019-04-18

## 2019-02-24 NOTE — TELEPHONE ENCOUNTER
Review pended refill request as it does not fall under a protocol.     Last Rx: 2-5-19  LOV: 10-16-18

## 2019-02-25 DIAGNOSIS — R11.0 CHRONIC NAUSEA: ICD-10-CM

## 2019-02-27 RX ORDER — PROCHLORPERAZINE MALEATE 5 MG/1
5 TABLET ORAL NIGHTLY PRN
Qty: 30 TABLET | Refills: 1 | Status: SHIPPED | OUTPATIENT
Start: 2019-02-27 | End: 2019-04-19

## 2019-02-27 NOTE — TELEPHONE ENCOUNTER
Recommend:  - Talk to Dr. Vesta Andrews about smoking cessation   - See Dr. Luis Nguyen in 8-12 weeks to discuss how Compazine is working   - Consideration for Gastric Emptying Scan   - ER s/sxs discussed  - Discontinue Zofran, begin Compazine per above  - See above

## 2019-02-27 NOTE — TELEPHONE ENCOUNTER
Please advise on refill request below. Thank you.     LV 01/03/19 with Moira Whitaker    LR 01/03/19 #30 with 1 refill

## 2019-03-06 RX ORDER — METFORMIN HYDROCHLORIDE 500 MG/1
TABLET, EXTENDED RELEASE ORAL
Qty: 120 TABLET | Refills: 0 | Status: SHIPPED | OUTPATIENT
Start: 2019-03-06 | End: 2019-04-01

## 2019-04-01 RX ORDER — FLUOXETINE HYDROCHLORIDE 20 MG/1
CAPSULE ORAL
Qty: 180 CAPSULE | Refills: 0 | Status: SHIPPED | OUTPATIENT
Start: 2019-04-01 | End: 2019-06-29

## 2019-04-01 RX ORDER — METFORMIN HYDROCHLORIDE 500 MG/1
TABLET, EXTENDED RELEASE ORAL
Qty: 120 TABLET | Refills: 0 | Status: SHIPPED | OUTPATIENT
Start: 2019-04-01 | End: 2019-05-07

## 2019-04-19 DIAGNOSIS — R11.0 CHRONIC NAUSEA: ICD-10-CM

## 2019-04-19 RX ORDER — ERGOCALCIFEROL 1.25 MG/1
CAPSULE ORAL
Qty: 12 CAPSULE | Refills: 1 | Status: SHIPPED | OUTPATIENT
Start: 2019-04-19 | End: 2019-10-07

## 2019-04-22 RX ORDER — PROCHLORPERAZINE MALEATE 5 MG/1
5 TABLET ORAL NIGHTLY PRN
Qty: 30 TABLET | Refills: 1 | Status: SHIPPED | OUTPATIENT
Start: 2019-04-22 | End: 2019-06-19

## 2019-04-22 NOTE — TELEPHONE ENCOUNTER
Left message for pt to call back to make an appointment for a f/up with  per Kerri Chapman PA-C . And refills is ready for .

## 2019-04-22 NOTE — TELEPHONE ENCOUNTER
Requested Prescriptions     Pending Prescriptions Disp Refills   • PROCHLORPERAZINE MALEATE 5 MG Oral [Pharmacy Med Name: Carmela Al 5MG TAB TABLET] 30 tablet 1     Sig: TAKE 1 TABLET (5 MG TOTAL) BY MOUTH NIGHTLY AS NEEDED FOR NAUSEA.      LOV-1/3/19  LR-

## 2019-04-22 NOTE — TELEPHONE ENCOUNTER
Patient was to see Dr. Diego Forbes after our office visit - she needs to make a follow up office visit with Dr. Diego Forbes.

## 2019-04-30 ENCOUNTER — APPOINTMENT (OUTPATIENT)
Dept: LAB | Facility: HOSPITAL | Age: 59
End: 2019-04-30
Attending: INTERNAL MEDICINE
Payer: MEDICAID

## 2019-04-30 ENCOUNTER — OFFICE VISIT (OUTPATIENT)
Dept: INTERNAL MEDICINE CLINIC | Facility: CLINIC | Age: 59
End: 2019-04-30
Payer: MEDICAID

## 2019-04-30 VITALS
BODY MASS INDEX: 20.09 KG/M2 | SYSTOLIC BLOOD PRESSURE: 107 MMHG | WEIGHT: 125 LBS | HEART RATE: 77 BPM | HEIGHT: 66 IN | DIASTOLIC BLOOD PRESSURE: 68 MMHG | TEMPERATURE: 98 F

## 2019-04-30 DIAGNOSIS — R80.9 TYPE 2 DIABETES MELLITUS WITH MICROALBUMINURIA, WITHOUT LONG-TERM CURRENT USE OF INSULIN (HCC): ICD-10-CM

## 2019-04-30 DIAGNOSIS — E11.29 TYPE 2 DIABETES MELLITUS WITH MICROALBUMINURIA, WITHOUT LONG-TERM CURRENT USE OF INSULIN (HCC): ICD-10-CM

## 2019-04-30 DIAGNOSIS — R05.9 COUGH: Primary | ICD-10-CM

## 2019-04-30 DIAGNOSIS — M75.81 TENDINITIS OF RIGHT ROTATOR CUFF: ICD-10-CM

## 2019-04-30 PROBLEM — Z78.9 STATIN INTOLERANCE: Status: ACTIVE | Noted: 2019-04-30

## 2019-04-30 PROCEDURE — 83036 HEMOGLOBIN GLYCOSYLATED A1C: CPT

## 2019-04-30 PROCEDURE — 99214 OFFICE O/P EST MOD 30 MIN: CPT | Performed by: INTERNAL MEDICINE

## 2019-04-30 PROCEDURE — 36415 COLL VENOUS BLD VENIPUNCTURE: CPT

## 2019-04-30 PROCEDURE — 99212 OFFICE O/P EST SF 10 MIN: CPT | Performed by: INTERNAL MEDICINE

## 2019-04-30 RX ORDER — LISINOPRIL 2.5 MG/1
2.5 TABLET ORAL DAILY
Qty: 90 TABLET | Refills: 5 | Status: SHIPPED | OUTPATIENT
Start: 2019-04-30 | End: 2019-10-27

## 2019-04-30 RX ORDER — AZITHROMYCIN 250 MG/1
TABLET, FILM COATED ORAL
Qty: 6 TABLET | Refills: 0 | Status: SHIPPED | OUTPATIENT
Start: 2019-04-30 | End: 2019-05-07 | Stop reason: ALTCHOICE

## 2019-04-30 RX ORDER — CODEINE PHOSPHATE AND GUAIFENESIN 10; 100 MG/5ML; MG/5ML
5 SOLUTION ORAL EVERY 6 HOURS PRN
Qty: 180 ML | Refills: 1 | Status: SHIPPED | OUTPATIENT
Start: 2019-04-30 | End: 2019-05-07

## 2019-04-30 NOTE — PROGRESS NOTES
HPI:    Patient ID: Lilia Carrillo is a 59year old female. Pt has been sick for 5 days. She has a bad cough with thick purulent mucus. She still has right shoulder pain and decreased ROM for at least 6 months and was seeing the pain doctor.   This is n Past Surgical History:   Procedure Laterality Date   • APPENDECTOMY     • APPENDECTOMY     • COLONOSCOPY N/A 11/20/2018    Performed by Orest Habermann, MD at [address] [place] [address] ENDOSCOPY   • ESOPHAGOGASTRODUODENOSCOPY (EGD) N/A 11/20/2018    Performed by Blood Glucose Monitoring Suppl (TRUE METRIX METER) W/DEVICE Does not apply Kit As needed  Disp:  Rfl: 0   TRUE METRIX BLOOD GLUCOSE TEST In Vitro Strip  Disp:  Rfl:    TRUEPLUS LANCETS 33G Does not apply Misc  Disp:  Rfl:    OxyCODONE HCl IR 30 MG Oral T Oropharynx is clear and moist. No posterior oropharyngeal erythema. Eyes: Conjunctivae are normal.   Cardiovascular: Normal rate, regular rhythm and normal heart sounds. Pulmonary/Chest: Effort normal and breath sounds normal. No respiratory distress. azithromycin (ZITHROMAX Z-AMEENA) 250 MG Oral Tab 6 tablet 0     Sig: Take two tablets by mouth today, then one tablet daily.    • guaiFENesin-codeine (CHERATUSSIN AC) 100-10 MG/5ML Oral Solution 180 mL 1     Sig: Take 5 mL by mouth every 6 (six) hours as need

## 2019-05-01 NOTE — ASSESSMENT & PLAN NOTE
The patient has a deep cough and likely has some COPD. I will treat her with a Z-Mat. Follow-up in 2 to 3 weeks.

## 2019-05-01 NOTE — ASSESSMENT & PLAN NOTE
The patient is overdue for an A1c. She will do it today. The patient is due to see the eye doctor in June. I have asked her to schedule this. She has microalbuminuria. I advised her to start low-dose lisinopril and she is willing to do this.   She is i

## 2019-05-06 ENCOUNTER — TELEPHONE (OUTPATIENT)
Dept: INTERNAL MEDICINE CLINIC | Facility: CLINIC | Age: 59
End: 2019-05-06

## 2019-05-06 NOTE — TELEPHONE ENCOUNTER
Verified pt name and . Reviewed doctor's recommendations as noted below. Pt agreed with plan of care, appt schedule 19.  Pt had no further questions at this time

## 2019-05-06 NOTE — TELEPHONE ENCOUNTER
Pt was seen 4/30/19 by LV for cough. Pt states completed Z-Mat on 5/4/19 but admits never even tried taking the Cheratussin cough syrup as states smelled horrible. Mentions that she quit smoking a week ago.  States cough has not improved and remains the dung

## 2019-05-07 ENCOUNTER — OFFICE VISIT (OUTPATIENT)
Dept: INTERNAL MEDICINE CLINIC | Facility: CLINIC | Age: 59
End: 2019-05-07
Payer: MEDICAID

## 2019-05-07 ENCOUNTER — HOSPITAL ENCOUNTER (OUTPATIENT)
Dept: GENERAL RADIOLOGY | Facility: HOSPITAL | Age: 59
Discharge: HOME | End: 2019-05-07
Attending: INTERNAL MEDICINE
Payer: MEDICAID

## 2019-05-07 VITALS
HEART RATE: 82 BPM | BODY MASS INDEX: 20.44 KG/M2 | DIASTOLIC BLOOD PRESSURE: 61 MMHG | WEIGHT: 127.19 LBS | HEIGHT: 66 IN | SYSTOLIC BLOOD PRESSURE: 100 MMHG | OXYGEN SATURATION: 96 % | TEMPERATURE: 97 F

## 2019-05-07 DIAGNOSIS — E11.65 UNCONTROLLED TYPE 2 DIABETES MELLITUS WITH HYPERGLYCEMIA, WITHOUT LONG-TERM CURRENT USE OF INSULIN (HCC): ICD-10-CM

## 2019-05-07 DIAGNOSIS — R05.9 COUGH: ICD-10-CM

## 2019-05-07 DIAGNOSIS — Z87.891 EX-SMOKER: ICD-10-CM

## 2019-05-07 DIAGNOSIS — R05.9 COUGH: Primary | ICD-10-CM

## 2019-05-07 PROBLEM — H61.23 CERUMEN DEBRIS ON TYMPANIC MEMBRANE OF BOTH EARS: Status: RESOLVED | Noted: 2018-10-16 | Resolved: 2019-05-07

## 2019-05-07 PROBLEM — J02.9 SORE THROAT: Status: RESOLVED | Noted: 2018-10-16 | Resolved: 2019-05-07

## 2019-05-07 PROCEDURE — 99214 OFFICE O/P EST MOD 30 MIN: CPT | Performed by: INTERNAL MEDICINE

## 2019-05-07 PROCEDURE — 71046 X-RAY EXAM CHEST 2 VIEWS: CPT | Performed by: INTERNAL MEDICINE

## 2019-05-07 PROCEDURE — 99212 OFFICE O/P EST SF 10 MIN: CPT | Performed by: INTERNAL MEDICINE

## 2019-05-07 RX ORDER — PERPHENAZINE 16 MG/1
TABLET, FILM COATED ORAL
Qty: 300 EACH | Refills: 3 | Status: SHIPPED | OUTPATIENT
Start: 2019-05-07 | End: 2020-05-16

## 2019-05-07 RX ORDER — LEVOFLOXACIN 500 MG/1
500 TABLET, FILM COATED ORAL DAILY
Qty: 7 TABLET | Refills: 0 | Status: SHIPPED | OUTPATIENT
Start: 2019-05-07 | End: 2019-05-14

## 2019-05-07 RX ORDER — METFORMIN HYDROCHLORIDE 500 MG/1
TABLET, EXTENDED RELEASE ORAL
Qty: 120 TABLET | Refills: 1 | Status: SHIPPED | OUTPATIENT
Start: 2019-05-07 | End: 2019-06-29

## 2019-05-07 NOTE — PROGRESS NOTES
HPI:    Patient ID: Ines Bowen is a 59year old female. She still has a lot of pain in her right lung. Her cough is about the same. Most antibiotics make her nauseated except for zithromax. She hasn't had a cigarette in 8 days. She did the A1C. 2 CAPSULES (40MG) BY MOUTH EVERY MORNING Disp: 180 capsule Rfl: 0   METFORMIN HCL  MG Oral Tablet 24 Hr TAKE TWO TABLETS BY MOUTH EVERY 12 HOURS Disp: 120 tablet Rfl: 0   LORazepam 0.5 MG Oral Tab Take 0.5 mg by mouth every 4 (four) hours as needed f Cancer Maternal Grandmother    • Other (Kidney Cancer) Maternal Grandmother    • Colon Cancer Other         Nephew   • Heart Disease Mother         Details unknown   • Hypertension Brother    • Colon Polyps Brother    • Colon Cancer Cousin    • Other (Othe This Visit:  Requested Prescriptions     Signed Prescriptions Disp Refills   • levofloxacin (LEVAQUIN) 500 MG Oral Tab 7 tablet 0     Sig: Take 1 tablet (500 mg total) by mouth daily for 7 days.

## 2019-05-07 NOTE — ASSESSMENT & PLAN NOTE
Pt has a terrible productive cough which is not improving. She also has pleuritic chest pain. She is intolerant of most antibiotics, but I told her she should take Levaquin and take Compazine to prevent the nausea. Chest x-ray today.

## 2019-05-14 RX ORDER — LEVOTHYROXINE SODIUM 0.07 MG/1
TABLET ORAL
Qty: 30 TABLET | Refills: 0 | Status: SHIPPED | OUTPATIENT
Start: 2019-05-14 | End: 2019-06-10

## 2019-05-28 ENCOUNTER — OFFICE VISIT (OUTPATIENT)
Dept: INTERNAL MEDICINE CLINIC | Facility: CLINIC | Age: 59
End: 2019-05-28
Payer: MEDICAID

## 2019-05-28 VITALS
BODY MASS INDEX: 20.41 KG/M2 | OXYGEN SATURATION: 96 % | RESPIRATION RATE: 20 BRPM | SYSTOLIC BLOOD PRESSURE: 92 MMHG | TEMPERATURE: 97 F | DIASTOLIC BLOOD PRESSURE: 55 MMHG | HEIGHT: 66 IN | WEIGHT: 127 LBS | HEART RATE: 69 BPM

## 2019-05-28 DIAGNOSIS — R05.9 COUGH: Primary | ICD-10-CM

## 2019-05-28 DIAGNOSIS — E78.00 HYPERCHOLESTEROLEMIA: ICD-10-CM

## 2019-05-28 DIAGNOSIS — E11.65 UNCONTROLLED TYPE 2 DIABETES MELLITUS WITH HYPERGLYCEMIA, WITHOUT LONG-TERM CURRENT USE OF INSULIN (HCC): ICD-10-CM

## 2019-05-28 PROCEDURE — 99214 OFFICE O/P EST MOD 30 MIN: CPT | Performed by: INTERNAL MEDICINE

## 2019-05-28 PROCEDURE — 99212 OFFICE O/P EST SF 10 MIN: CPT | Performed by: INTERNAL MEDICINE

## 2019-05-28 RX ORDER — ALBUTEROL SULFATE 90 UG/1
2 AEROSOL, METERED RESPIRATORY (INHALATION) EVERY 6 HOURS PRN
Qty: 1 INHALER | Refills: 0 | OUTPATIENT
Start: 2019-05-28 | End: 2021-11-16

## 2019-05-28 NOTE — PATIENT INSTRUCTIONS
Do fasting labs a few days before your next appointment. Please schedule that appointment in 2 weeks. Fast for 12 hours. Water and meds are okay. Walk in.

## 2019-05-28 NOTE — ASSESSMENT & PLAN NOTE
Her cough is not improving despite 2 rounds of antibiotics, most recently including Levaquin. The patient does not feel sick, however. I would like to give her prednisone, however she says she reacts badly to this.   She received an albuterol inhaler from

## 2019-05-28 NOTE — PROGRESS NOTES
HPI:    Patient ID: Jakub Rashid is a 59year old female. Pt has been coughing for over a month and it is not improving. She doesn't feel sick. Cough   This is a new problem. The current episode started more than 1 month ago.  The problem has been u TAKE 2 CAPSULES (40MG) BY MOUTH EVERY MORNING Disp: 180 capsule Rfl: 0   ezetimibe (ZETIA) 10 MG Oral Tab Take 1 tablet (10 mg total) by mouth daily.  Disp: 90 tablet Rfl: 1   Blood Glucose Monitoring Suppl Supplies Does not apply Misc Please supply patient Paternal Aunt         crohns disease,dementia   • Diabetes Neg    • Glaucoma Neg        . BP 92/55 (BP Location: Left arm, Patient Position: Sitting, Cuff Size: adult)   Pulse 69   Temp 97.4 °F (36.3 °C)   Resp 20   Ht 5' 6\" (1.676 m)   Wt 127 lb (57.6 kg) plan.    Meds This Visit:  Requested Prescriptions     Signed Prescriptions Disp Refills   • Albuterol Sulfate  (90 Base) MCG/ACT Inhalation Aero Soln 1 Inhaler 0     Sig: Inhale 2 puffs into the lungs every 6 (six) hours as needed for Wheezing (cou

## 2019-06-10 DIAGNOSIS — E03.9 ACQUIRED HYPOTHYROIDISM: Primary | ICD-10-CM

## 2019-06-14 RX ORDER — LEVOTHYROXINE SODIUM 0.07 MG/1
TABLET ORAL
Qty: 30 TABLET | Refills: 0 | Status: SHIPPED | OUTPATIENT
Start: 2019-06-14 | End: 2019-07-11

## 2019-06-19 DIAGNOSIS — R11.0 CHRONIC NAUSEA: ICD-10-CM

## 2019-06-20 RX ORDER — PROCHLORPERAZINE MALEATE 5 MG/1
5 TABLET ORAL NIGHTLY PRN
Qty: 30 TABLET | Refills: 1 | Status: SHIPPED | OUTPATIENT
Start: 2019-06-20 | End: 2019-08-13

## 2019-06-20 NOTE — TELEPHONE ENCOUNTER
PROCHLORPERAZINE MALEATE 5 MG Oral     Sig: TAKE 1 TABLET (5 MG TOTAL) BY MOUTH NIGHTLY AS NEEDED FOR NAUSEA.     Disp:  30 tablet (Pharmacy requested: 30 Undefined)    Refills:  1 (Pharmacy requested: Not specified)    Start: 6/19/2019    Class: Normal

## 2019-06-21 ENCOUNTER — NURSE TRIAGE (OUTPATIENT)
Dept: OTHER | Age: 59
End: 2019-06-21

## 2019-06-21 DIAGNOSIS — R39.9 URINARY SYMPTOM OR SIGN: ICD-10-CM

## 2019-06-21 DIAGNOSIS — J30.9 ALLERGIC RHINITIS, UNSPECIFIED SEASONALITY, UNSPECIFIED TRIGGER: Primary | ICD-10-CM

## 2019-06-21 DIAGNOSIS — E11.65 UNCONTROLLED TYPE 2 DIABETES MELLITUS WITH HYPERGLYCEMIA, WITHOUT LONG-TERM CURRENT USE OF INSULIN (HCC): ICD-10-CM

## 2019-06-21 RX ORDER — MONTELUKAST SODIUM 10 MG/1
10 TABLET ORAL DAILY
Qty: 30 TABLET | Refills: 5 | Status: SHIPPED | OUTPATIENT
Start: 2019-06-21 | End: 2020-04-02

## 2019-06-21 RX ORDER — HYDROXYZINE PAMOATE 25 MG/1
25 CAPSULE ORAL NIGHTLY PRN
Qty: 30 CAPSULE | Refills: 5 | Status: SHIPPED | OUTPATIENT
Start: 2019-06-21

## 2019-06-21 RX ORDER — FLUTICASONE PROPIONATE 50 MCG
2 SPRAY, SUSPENSION (ML) NASAL DAILY
Qty: 1 BOTTLE | Refills: 3 | Status: SHIPPED | OUTPATIENT
Start: 2019-06-21 | End: 2020-04-02

## 2019-06-21 NOTE — TELEPHONE ENCOUNTER
Action Requested: Summary for Provider     []  Critical Lab, Recommendations Needed  [x] Need Additional Advice  []   FYI    []   Need Orders  [] Need Medications Sent to Pharmacy  []  Other     SUMMARY: Candice Padilla pt stated that she has been having really

## 2019-06-21 NOTE — TELEPHONE ENCOUNTER
I do not give Kenalog injections. I recommend that she take Claritin in the morning and take Singulair and Benadryl 1-2 tabs at nighttime. I also recommend that she use Flonase every day.   It takes 2 weeks for Flonase to start working, so she has to stic

## 2019-06-21 NOTE — TELEPHONE ENCOUNTER
Pt informed of MD message below and pt verb understanding. Pt also asking if urine labs entered, pt informed orders in system.

## 2019-06-21 NOTE — TELEPHONE ENCOUNTER
Spoke with patient (verified name and ),advised Dr Jennifer Mckoy notes and verbalized understanding. States that she cannot take benadryl due to previous side effects like nightmare and restless leg/leg cramps,asking if this is ok and what alternative medica

## 2019-06-29 RX ORDER — METFORMIN HYDROCHLORIDE 500 MG/1
TABLET, EXTENDED RELEASE ORAL
Qty: 360 TABLET | Refills: 1 | Status: SHIPPED | OUTPATIENT
Start: 2019-06-29 | End: 2019-07-17

## 2019-06-29 RX ORDER — FLUOXETINE HYDROCHLORIDE 20 MG/1
CAPSULE ORAL
Qty: 180 CAPSULE | Refills: 1 | Status: SHIPPED | OUTPATIENT
Start: 2019-06-29 | End: 2020-01-15

## 2019-07-11 DIAGNOSIS — E03.9 ACQUIRED HYPOTHYROIDISM: ICD-10-CM

## 2019-07-15 NOTE — TELEPHONE ENCOUNTER
Spoke to patient and she stated that she got busy over the weekend and was not able to get labs done. Patient states she will complete lab work tomorrow.

## 2019-07-17 ENCOUNTER — LAB ENCOUNTER (OUTPATIENT)
Dept: LAB | Facility: HOSPITAL | Age: 59
End: 2019-07-17
Attending: INTERNAL MEDICINE
Payer: MEDICAID

## 2019-07-17 ENCOUNTER — TELEPHONE (OUTPATIENT)
Dept: GASTROENTEROLOGY | Facility: CLINIC | Age: 59
End: 2019-07-17

## 2019-07-17 ENCOUNTER — OFFICE VISIT (OUTPATIENT)
Dept: GASTROENTEROLOGY | Facility: CLINIC | Age: 59
End: 2019-07-17
Payer: MEDICAID

## 2019-07-17 ENCOUNTER — TELEPHONE (OUTPATIENT)
Dept: INTERNAL MEDICINE CLINIC | Facility: CLINIC | Age: 59
End: 2019-07-17

## 2019-07-17 VITALS
DIASTOLIC BLOOD PRESSURE: 55 MMHG | HEIGHT: 66 IN | WEIGHT: 125 LBS | BODY MASS INDEX: 20.09 KG/M2 | SYSTOLIC BLOOD PRESSURE: 93 MMHG | HEART RATE: 67 BPM

## 2019-07-17 DIAGNOSIS — R80.9 TYPE 2 DIABETES MELLITUS WITH MICROALBUMINURIA, WITHOUT LONG-TERM CURRENT USE OF INSULIN (HCC): Primary | ICD-10-CM

## 2019-07-17 DIAGNOSIS — R80.9 TYPE 2 DIABETES MELLITUS WITH MICROALBUMINURIA, WITHOUT LONG-TERM CURRENT USE OF INSULIN (HCC): ICD-10-CM

## 2019-07-17 DIAGNOSIS — E11.65 UNCONTROLLED TYPE 2 DIABETES MELLITUS WITH HYPERGLYCEMIA, WITHOUT LONG-TERM CURRENT USE OF INSULIN (HCC): ICD-10-CM

## 2019-07-17 DIAGNOSIS — E03.9 ACQUIRED HYPOTHYROIDISM: ICD-10-CM

## 2019-07-17 DIAGNOSIS — R63.4 WEIGHT LOSS: Primary | ICD-10-CM

## 2019-07-17 DIAGNOSIS — E11.29 TYPE 2 DIABETES MELLITUS WITH MICROALBUMINURIA, WITHOUT LONG-TERM CURRENT USE OF INSULIN (HCC): ICD-10-CM

## 2019-07-17 DIAGNOSIS — R63.0 POOR APPETITE: ICD-10-CM

## 2019-07-17 DIAGNOSIS — K21.9 GASTROESOPHAGEAL REFLUX DISEASE, ESOPHAGITIS PRESENCE NOT SPECIFIED: ICD-10-CM

## 2019-07-17 DIAGNOSIS — R11.0 NAUSEA: ICD-10-CM

## 2019-07-17 DIAGNOSIS — E11.29 TYPE 2 DIABETES MELLITUS WITH MICROALBUMINURIA, WITHOUT LONG-TERM CURRENT USE OF INSULIN (HCC): Primary | ICD-10-CM

## 2019-07-17 DIAGNOSIS — E78.00 HYPERCHOLESTEROLEMIA: ICD-10-CM

## 2019-07-17 DIAGNOSIS — R39.9 URINARY SYMPTOM OR SIGN: ICD-10-CM

## 2019-07-17 LAB
ALBUMIN SERPL-MCNC: 4.2 G/DL (ref 3.4–5)
ALBUMIN/GLOB SERPL: 1.4 {RATIO} (ref 1–2)
ALP LIVER SERPL-CCNC: 58 U/L (ref 46–118)
ALT SERPL-CCNC: 19 U/L (ref 13–56)
ANION GAP SERPL CALC-SCNC: 7 MMOL/L (ref 0–18)
AST SERPL-CCNC: 14 U/L (ref 15–37)
BILIRUB SERPL-MCNC: 0.4 MG/DL (ref 0.1–2)
BILIRUB UR QL: NEGATIVE
BUN BLD-MCNC: 12 MG/DL (ref 7–18)
BUN/CREAT SERPL: 17.1 (ref 10–20)
CALCIUM BLD-MCNC: 9.2 MG/DL (ref 8.5–10.1)
CHLORIDE SERPL-SCNC: 105 MMOL/L (ref 98–112)
CHOLEST SMN-MCNC: 266 MG/DL (ref ?–200)
CLARITY UR: CLEAR
CO2 SERPL-SCNC: 29 MMOL/L (ref 21–32)
COLOR UR: YELLOW
CREAT BLD-MCNC: 0.7 MG/DL (ref 0.55–1.02)
CREAT UR-SCNC: 195 MG/DL
EST. AVERAGE GLUCOSE BLD GHB EST-MCNC: 160 MG/DL (ref 68–126)
GLOBULIN PLAS-MCNC: 3 G/DL (ref 2.8–4.4)
GLUCOSE BLD-MCNC: 140 MG/DL (ref 70–99)
GLUCOSE UR-MCNC: NEGATIVE MG/DL
HBA1C MFR BLD HPLC: 7.2 % (ref ?–5.7)
HDLC SERPL-MCNC: 70 MG/DL (ref 40–59)
KETONES UR-MCNC: NEGATIVE MG/DL
LDLC SERPL CALC-MCNC: 183 MG/DL (ref ?–100)
M PROTEIN MFR SERPL ELPH: 7.2 G/DL (ref 6.4–8.2)
MICROALBUMIN UR-MCNC: 1.97 MG/DL
MICROALBUMIN/CREAT 24H UR-RTO: 10.1 UG/MG (ref ?–30)
NITRITE UR QL STRIP.AUTO: NEGATIVE
NONHDLC SERPL-MCNC: 196 MG/DL (ref ?–130)
OSMOLALITY SERPL CALC.SUM OF ELEC: 294 MOSM/KG (ref 275–295)
PATIENT FASTING: YES
PATIENT FASTING: YES
PH UR: 6 [PH] (ref 5–8)
POTASSIUM SERPL-SCNC: 4.1 MMOL/L (ref 3.5–5.1)
PROT UR-MCNC: NEGATIVE MG/DL
RBC #/AREA URNS AUTO: 4 /HPF
SODIUM SERPL-SCNC: 141 MMOL/L (ref 136–145)
SP GR UR STRIP: 1.02 (ref 1–1.03)
TRIGL SERPL-MCNC: 64 MG/DL (ref 30–149)
TSI SER-ACNC: 4.74 MIU/ML (ref 0.36–3.74)
UROBILINOGEN UR STRIP-ACNC: <2
VIT C UR-MCNC: NEGATIVE MG/DL
VLDLC SERPL CALC-MCNC: 13 MG/DL (ref 0–30)
WBC #/AREA URNS AUTO: 15 /HPF

## 2019-07-17 PROCEDURE — 87086 URINE CULTURE/COLONY COUNT: CPT

## 2019-07-17 PROCEDURE — 83036 HEMOGLOBIN GLYCOSYLATED A1C: CPT

## 2019-07-17 PROCEDURE — 80053 COMPREHEN METABOLIC PANEL: CPT

## 2019-07-17 PROCEDURE — 82043 UR ALBUMIN QUANTITATIVE: CPT

## 2019-07-17 PROCEDURE — 84443 ASSAY THYROID STIM HORMONE: CPT

## 2019-07-17 PROCEDURE — 36415 COLL VENOUS BLD VENIPUNCTURE: CPT

## 2019-07-17 PROCEDURE — 82570 ASSAY OF URINE CREATININE: CPT

## 2019-07-17 PROCEDURE — 81001 URINALYSIS AUTO W/SCOPE: CPT

## 2019-07-17 PROCEDURE — 99214 OFFICE O/P EST MOD 30 MIN: CPT | Performed by: INTERNAL MEDICINE

## 2019-07-17 PROCEDURE — 80061 LIPID PANEL: CPT

## 2019-07-17 RX ORDER — GLIMEPIRIDE 2 MG/1
2 TABLET ORAL 2 TIMES DAILY
Qty: 60 TABLET | Refills: 3 | Status: SHIPPED | OUTPATIENT
Start: 2019-07-17 | End: 2019-11-05

## 2019-07-17 RX ORDER — ROSUVASTATIN CALCIUM 5 MG/1
5 TABLET, COATED ORAL
Qty: 10 TABLET | Refills: 5 | Status: SHIPPED | OUTPATIENT
Start: 2019-07-18 | End: 2020-01-15

## 2019-07-17 RX ORDER — LEVOTHYROXINE SODIUM 88 UG/1
88 TABLET ORAL
Qty: 90 TABLET | Refills: 1 | Status: SHIPPED | OUTPATIENT
Start: 2019-07-17 | End: 2020-01-15

## 2019-07-17 NOTE — TELEPHONE ENCOUNTER
Janan Cooks -   I am concerned that some of Ms Mc's severe nausea and weight loss may be due (in part) to the metformin. Her symptoms have been so severe that she is requiring Compazine (Zofran doesn't work). Do you mind if she sees Endocrine for some help/education on her diabetes and meds?   Does she need a referral?    - cb

## 2019-07-17 NOTE — TELEPHONE ENCOUNTER
Thierry Munoz,  Metformin is her only diabetic medication. We can stop it and I can give her something else. I don't think she needs to see endocrinology, since she is not on multiple diabetes medications. I will have my nurse call and let her know after I get her blood test results from today. Thanks.   Hang Andrade
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
## 2019-07-18 RX ORDER — EZETIMIBE 10 MG/1
10 TABLET ORAL NIGHTLY
Qty: 90 TABLET | Refills: 3 | Status: SHIPPED | OUTPATIENT
Start: 2019-07-18 | End: 2020-08-07

## 2019-07-18 NOTE — TELEPHONE ENCOUNTER
Pt called back an was informed of  message below. She will callus back to schedule a appt with .  clarification you want pt on Zetia and Rosuvastatin Calcium 5 MG Oral Tab correct? If yes pt needs a refill on zetia please sign

## 2019-07-18 NOTE — TELEPHONE ENCOUNTER
Verified with Merrill Sousa RN who had spoken with this patient that she had already informed pt to take both cholesterol medications. No further action needed as [facility] sent refill of Zetia.

## 2019-07-18 NOTE — TELEPHONE ENCOUNTER
Please call pt:  Dr. Tam David would like for you to stop taking your metformin due to your gastrointestinal problems. Your A1c was 7.2. You can stop the Metformin and I will give you a different medication called glimepiride.   Your tests indicate that your

## 2019-08-13 DIAGNOSIS — R11.0 CHRONIC NAUSEA: ICD-10-CM

## 2019-08-14 RX ORDER — PROCHLORPERAZINE MALEATE 5 MG/1
TABLET ORAL
Qty: 30 TABLET | Refills: 1 | Status: SHIPPED | OUTPATIENT
Start: 2019-08-14 | End: 2019-10-07

## 2019-08-14 NOTE — TELEPHONE ENCOUNTER
Requested Prescriptions     Pending Prescriptions Disp Refills   • PROCHLORPERAZINE MALEATE 5 MG Oral [Pharmacy Med Name: Jaky Finley 5MG TAB TABLET] 30 tablet 1     Sig: TAKE ONE TABLET BY MOUTH AT BEDTIME AS NEEDED FOR NAUSEA     LOV 4/22/19  LR 6/20/19

## 2019-08-14 NOTE — TELEPHONE ENCOUNTER
Refilled - see 7/17 OV with Dr. Abbi Dempsey. Ideally, steps being taken to potentially d/c Metformin and alter patient's anti-emetic regimen.

## 2019-08-27 ENCOUNTER — OFFICE VISIT (OUTPATIENT)
Dept: ALLERGY | Facility: CLINIC | Age: 59
End: 2019-08-27
Payer: MEDICAID

## 2019-08-27 ENCOUNTER — APPOINTMENT (OUTPATIENT)
Dept: ALLERGY | Facility: CLINIC | Age: 59
End: 2019-08-27
Payer: MEDICAID

## 2019-08-27 VITALS
OXYGEN SATURATION: 99 % | DIASTOLIC BLOOD PRESSURE: 71 MMHG | TEMPERATURE: 97 F | RESPIRATION RATE: 18 BRPM | HEART RATE: 82 BPM | SYSTOLIC BLOOD PRESSURE: 128 MMHG

## 2019-08-27 DIAGNOSIS — J30.2 PERENNIAL ALLERGIC RHINITIS WITH SEASONAL VARIATION: ICD-10-CM

## 2019-08-27 DIAGNOSIS — J30.89 PERENNIAL ALLERGIC RHINITIS WITH SEASONAL VARIATION: ICD-10-CM

## 2019-08-27 DIAGNOSIS — J30.89 PERENNIAL ALLERGIC RHINITIS WITH SEASONAL VARIATION: Primary | ICD-10-CM

## 2019-08-27 DIAGNOSIS — J30.2 PERENNIAL ALLERGIC RHINITIS WITH SEASONAL VARIATION: Primary | ICD-10-CM

## 2019-08-27 DIAGNOSIS — Z87.09 HISTORY OF COPD: ICD-10-CM

## 2019-08-27 PROCEDURE — 95004 PERQ TESTS W/ALRGNC XTRCS: CPT | Performed by: ALLERGY & IMMUNOLOGY

## 2019-08-27 PROCEDURE — 94010 BREATHING CAPACITY TEST: CPT | Performed by: ALLERGY & IMMUNOLOGY

## 2019-08-27 PROCEDURE — 99204 OFFICE O/P NEW MOD 45 MIN: CPT | Performed by: ALLERGY & IMMUNOLOGY

## 2019-08-27 RX ORDER — AZELASTINE HYDROCHLORIDE 0.5 MG/ML
1 SOLUTION/ DROPS OPHTHALMIC 2 TIMES DAILY
Qty: 1 BOTTLE | Refills: 0 | Status: SHIPPED | OUTPATIENT
Start: 2019-08-27 | End: 2019-09-24

## 2019-08-27 RX ORDER — LEVOCETIRIZINE DIHYDROCHLORIDE 5 MG/1
5 TABLET, FILM COATED ORAL NIGHTLY
Qty: 30 TABLET | Refills: 0 | Status: SHIPPED | OUTPATIENT
Start: 2019-08-27 | End: 2019-12-06

## 2019-08-27 NOTE — PATIENT INSTRUCTIONS
1. AR  Handouts on allergic rhinitis and nonallergic rhinitis provided and reviewed  Reviewed allergy avoidance measures as well as potential treatment option of immunotherapy if individual allergens are detected  Recommended trial of Xyzal, levocetirizine

## 2019-08-27 NOTE — PROGRESS NOTES
Shanae Jamison is a 62year old female. HPI:   Patient presents with: Allergies: Patient reports having deep cough, itchy/watery eyes, \"sandpapery tongue\".     Patient is a 22-year-old female who presents for allergy consultation upon referral of her P Other         Nephew   • Heart Disease Mother         Details unknown   • Hypertension Brother    • Colon Polyps Brother    • Colon Cancer Cousin    • Other (Other) Paternal Aunt         crohns disease,dementia   • Diabetes Neg    • Glaucoma Neg       Soci Oral Cap Take 1 capsule (25 mg total) by mouth nightly as needed (allergies). Disp: 30 capsule Rfl: 5   Albuterol Sulfate  (90 Base) MCG/ACT Inhalation Aero Soln Inhale 2 puffs into the lungs every 6 (six) hours as needed for Wheezing (cough).  Disp: loss  Gastrointestinal:  Negative for abdominal pain, diarrhea and vomiting  Genitourinary:  Negative for dysuria and hematuria  Hema/Lymph:  Negative for easy bleeding and easy bruising  Integumentary:  Negative for pruritus and rash  Musculoskeletal:  Ne detected  Recommended trial of Xyzal, levocetirizine 5 mg once night at bedtime as a nonsedating antihistamine  Continue with Flonase and Singulair  Consider sinus rinses if refractory    2. Hx of copd  Patient is a former smoker.   No prior assessment of l

## 2019-09-24 RX ORDER — AZELASTINE HYDROCHLORIDE 0.5 MG/ML
SOLUTION/ DROPS OPHTHALMIC
Qty: 1 BOTTLE | Refills: 2 | Status: SHIPPED | OUTPATIENT
Start: 2019-09-24 | End: 2020-03-09

## 2019-09-24 NOTE — TELEPHONE ENCOUNTER
Refill requested for   Azelastine HCl 0.05 % Ophthalmic Solution 1 Bottle 0 8/27/2019    Sig:   Place 1 drop into both eyes 2 (two) times daily.      Route:   Both Eyes         Last office visit: 8/27/19    Previously advised to follow up in:  Please clarif

## 2019-09-25 ENCOUNTER — OFFICE VISIT (OUTPATIENT)
Dept: INTERNAL MEDICINE CLINIC | Facility: CLINIC | Age: 59
End: 2019-09-25
Payer: MEDICAID

## 2019-09-25 VITALS
WEIGHT: 126.19 LBS | HEIGHT: 66 IN | BODY MASS INDEX: 20.28 KG/M2 | HEART RATE: 80 BPM | SYSTOLIC BLOOD PRESSURE: 99 MMHG | DIASTOLIC BLOOD PRESSURE: 62 MMHG

## 2019-09-25 DIAGNOSIS — M54.2 NECK PAIN: ICD-10-CM

## 2019-09-25 DIAGNOSIS — E03.9 ACQUIRED HYPOTHYROIDISM: ICD-10-CM

## 2019-09-25 DIAGNOSIS — E78.00 HYPERCHOLESTEROLEMIA: ICD-10-CM

## 2019-09-25 DIAGNOSIS — E11.65 UNCONTROLLED TYPE 2 DIABETES MELLITUS WITH HYPERGLYCEMIA, WITHOUT LONG-TERM CURRENT USE OF INSULIN (HCC): ICD-10-CM

## 2019-09-25 DIAGNOSIS — Z23 NEED FOR VACCINATION: ICD-10-CM

## 2019-09-25 DIAGNOSIS — Z12.31 ENCOUNTER FOR SCREENING MAMMOGRAM FOR MALIGNANT NEOPLASM OF BREAST: ICD-10-CM

## 2019-09-25 DIAGNOSIS — R05.3 CHRONIC COUGH: ICD-10-CM

## 2019-09-25 DIAGNOSIS — R42 LIGHTHEADEDNESS: Primary | ICD-10-CM

## 2019-09-25 PROBLEM — M75.81 TENDINITIS OF RIGHT ROTATOR CUFF: Status: RESOLVED | Noted: 2019-04-30 | Resolved: 2019-09-25

## 2019-09-25 PROBLEM — R63.4 WEIGHT LOSS: Status: RESOLVED | Noted: 2018-07-16 | Resolved: 2019-09-25

## 2019-09-25 LAB
GLUCOSE BLOOD: 126
TEST STRIP LOT #: NORMAL NUMERIC

## 2019-09-25 PROCEDURE — 99214 OFFICE O/P EST MOD 30 MIN: CPT | Performed by: INTERNAL MEDICINE

## 2019-09-25 PROCEDURE — 82962 GLUCOSE BLOOD TEST: CPT | Performed by: INTERNAL MEDICINE

## 2019-09-25 PROCEDURE — 36416 COLLJ CAPILLARY BLOOD SPEC: CPT | Performed by: INTERNAL MEDICINE

## 2019-09-25 NOTE — ASSESSMENT & PLAN NOTE
Patient seems to have positional lightheadedness. I have encouraged her to stay hydrated. We will check some blood tests. I also advised her to check her blood sugar during an episode.   Her blood sugar today while she was lightheaded was

## 2019-09-25 NOTE — ASSESSMENT & PLAN NOTE
Pt's last A1C was 7.2. She is overdue for her eye exam.  I will refer her again. Continue current meds.

## 2019-09-25 NOTE — ASSESSMENT & PLAN NOTE
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 I will refer her for some physical therapy.

## 2019-09-25 NOTE — PROGRESS NOTES
HPI:    Patient ID: Jakub Rashid is a 62year old female. She has been lightheaded lately. She hasn't checked her sugars. She doesn't eat regularly. She is tolerating the rosuvastatin. Dizziness   This is a new problem.  The current episode starte Rfl: 1   ezetimibe 10 MG Oral Tab Take 1 tablet (10 mg total) by mouth nightly.  Disp: 90 tablet Rfl: 3   Levothyroxine Sodium 88 MCG Oral Tab Take 1 tablet (88 mcg total) by mouth before breakfast. Disp: 90 tablet Rfl: 1   glimepiride 2 MG Oral Tab Take 1 Please supply patient with glucose meter, test strips and lancets covered by her insurance, test TID Disp: 1 kit Rfl: 0   Alcohol Swabs (BD SWAB SINGLE USE REGULAR) Does not apply Pads  Disp:  Rfl:    Blood Glucose Monitoring Suppl (TRUE METRIX METER) W/DE Normal rate, regular rhythm and normal heart sounds. Pulmonary/Chest: Effort normal and breath sounds normal. No respiratory distress. Musculoskeletal:        Cervical back: She exhibits tenderness and pain. She exhibits normal range of motion.    Sujit Jalloh expressed understanding and agreed with the plan.     Meds This Visit:  Requested Prescriptions      No prescriptions requested or ordered in this encounter

## 2019-10-07 DIAGNOSIS — R11.0 CHRONIC NAUSEA: ICD-10-CM

## 2019-10-07 RX ORDER — PROCHLORPERAZINE MALEATE 5 MG/1
TABLET ORAL
Qty: 30 TABLET | Refills: 1 | Status: SHIPPED | OUTPATIENT
Start: 2019-10-07 | End: 2019-12-18

## 2019-10-07 NOTE — TELEPHONE ENCOUNTER
Requested Prescriptions     Pending Prescriptions Disp Refills   • PROCHLORPERAZINE MALEATE 5 MG Oral [Pharmacy Med Name: Carmela Al 5MG TAB TABLET] 30 tablet 1     Sig: TAKE ONE TABLET BY MOUTH AT BEDTIME AS NEEDED FOR NAUSEA     LOV 7/17/19  LR 8/14/19,

## 2019-10-09 RX ORDER — ERGOCALCIFEROL 1.25 MG/1
CAPSULE ORAL
Qty: 12 CAPSULE | Refills: 1 | Status: SHIPPED | OUTPATIENT
Start: 2019-10-09 | End: 2020-04-01

## 2019-10-09 NOTE — TELEPHONE ENCOUNTER
Review pended refill request as it does not fall under a protocol.   Requested Prescriptions     Pending Prescriptions Disp Refills   • ERGOCALCIFEROL 80522 units Oral Cap [Pharmacy Med Name: VITAMIN D 58011GEP CAP CAPSULE] 12 capsule 1     Sig: TAKE ONE CA

## 2019-10-10 NOTE — TELEPHONE ENCOUNTER
I called a detailed voicemail message for pt to call us back to schedule a F/U appt w/Dr Sugey Romero

## 2019-10-15 ENCOUNTER — LAB ENCOUNTER (OUTPATIENT)
Dept: LAB | Facility: HOSPITAL | Age: 59
End: 2019-10-15
Attending: INTERNAL MEDICINE
Payer: MEDICAID

## 2019-10-15 DIAGNOSIS — R42 LIGHTHEADEDNESS: ICD-10-CM

## 2019-10-15 DIAGNOSIS — E11.29 TYPE 2 DIABETES MELLITUS WITH MICROALBUMINURIA, WITHOUT LONG-TERM CURRENT USE OF INSULIN (HCC): ICD-10-CM

## 2019-10-15 DIAGNOSIS — R80.9 TYPE 2 DIABETES MELLITUS WITH MICROALBUMINURIA, WITHOUT LONG-TERM CURRENT USE OF INSULIN (HCC): ICD-10-CM

## 2019-10-15 DIAGNOSIS — E11.65 UNCONTROLLED TYPE 2 DIABETES MELLITUS WITH HYPERGLYCEMIA, WITHOUT LONG-TERM CURRENT USE OF INSULIN (HCC): ICD-10-CM

## 2019-10-15 DIAGNOSIS — E78.00 HYPERCHOLESTEROLEMIA: ICD-10-CM

## 2019-10-15 PROCEDURE — 84443 ASSAY THYROID STIM HORMONE: CPT

## 2019-10-15 PROCEDURE — 83036 HEMOGLOBIN GLYCOSYLATED A1C: CPT

## 2019-10-15 PROCEDURE — 82607 VITAMIN B-12: CPT

## 2019-10-15 PROCEDURE — 82746 ASSAY OF FOLIC ACID SERUM: CPT

## 2019-10-15 PROCEDURE — 36415 COLL VENOUS BLD VENIPUNCTURE: CPT

## 2019-10-15 PROCEDURE — 85025 COMPLETE CBC W/AUTO DIFF WBC: CPT

## 2019-10-15 PROCEDURE — 80053 COMPREHEN METABOLIC PANEL: CPT

## 2019-10-15 PROCEDURE — 80061 LIPID PANEL: CPT

## 2019-10-21 ENCOUNTER — OFFICE VISIT (OUTPATIENT)
Dept: OPTOMETRY | Facility: CLINIC | Age: 59
End: 2019-10-21
Payer: MEDICAID

## 2019-10-21 DIAGNOSIS — H40.003 GLAUCOMA SUSPECT, BILATERAL: ICD-10-CM

## 2019-10-21 DIAGNOSIS — H25.13 AGE-RELATED NUCLEAR CATARACT OF BOTH EYES: ICD-10-CM

## 2019-10-21 DIAGNOSIS — E11.9 TYPE 2 DIABETES MELLITUS WITHOUT COMPLICATION, WITHOUT LONG-TERM CURRENT USE OF INSULIN (HCC): Primary | ICD-10-CM

## 2019-10-21 PROCEDURE — 92014 COMPRE OPH EXAM EST PT 1/>: CPT | Performed by: OPTOMETRIST

## 2019-10-21 PROCEDURE — 92133 CPTRZD OPH DX IMG PST SGM ON: CPT | Performed by: OPTOMETRIST

## 2019-10-21 NOTE — ASSESSMENT & PLAN NOTE
Had an OCT done to compare with last year's test and stable. No treatment is required. Will continue to observe.

## 2019-10-21 NOTE — PROGRESS NOTES
Brittany Valdez is a 62year old female. HPI:     HPI     Diabetic Eye Exam     Diabetes characteristics include controlled with diet, Type 1 and taking oral medications. Duration of 9 years. Number of years diabetic 9. Number of years on pills 9.   Num Quit Monday    Alcohol use: No      Alcohol/week: 0.0 standard drinks    Drug use: No      Medications:  ERGOCALCIFEROL 70360 units Oral Cap, TAKE ONE CAPSULE BY MOUTH EVERY WEEK, Disp: 12 capsule, Rfl: 1  AZELASTINE HCL 0.05 % Ophthalmic Solution, PLACE 1 Pads, , Disp: , Rfl:   Blood Glucose Monitoring Suppl (TRUE METRIX METER) W/DEVICE Does not apply Kit, As needed , Disp: , Rfl: 0  TRUEPLUS LANCETS 33G Does not apply Misc, , Disp: , Rfl:   OxyCODONE HCl IR 30 MG Oral Tab, Take 30 mg by mouth as needed.   , Normal Normal            Slit Lamp and Fundus Exam     External Exam       Right Left    External Normal Normal          Slit Lamp Exam       Right Left    Lids/Lashes Normal Normal    Conjunctiva/Sclera Normal Normal    Cornea Clear Clear    Anterior

## 2019-11-05 DIAGNOSIS — R80.9 TYPE 2 DIABETES MELLITUS WITH MICROALBUMINURIA, WITHOUT LONG-TERM CURRENT USE OF INSULIN (HCC): ICD-10-CM

## 2019-11-05 DIAGNOSIS — E11.29 TYPE 2 DIABETES MELLITUS WITH MICROALBUMINURIA, WITHOUT LONG-TERM CURRENT USE OF INSULIN (HCC): ICD-10-CM

## 2019-11-06 RX ORDER — GLIMEPIRIDE 2 MG/1
TABLET ORAL
Qty: 180 TABLET | Refills: 1 | Status: SHIPPED | OUTPATIENT
Start: 2019-11-06 | End: 2020-05-16

## 2019-11-07 NOTE — TELEPHONE ENCOUNTER
Refill passed per [facility], [facility] protocol.   Diabetes Medications  Protocol Criteria:  · Appointment scheduled in the past 6 months or the next 3 months  · A1C < 7.5 in the past 6 months  · Creatinine in the past 12 months  · Creatinine result < 1.5   Rece

## 2019-12-06 RX ORDER — LEVOCETIRIZINE DIHYDROCHLORIDE 5 MG/1
TABLET, FILM COATED ORAL
Qty: 30 TABLET | Refills: 1 | Status: SHIPPED | OUTPATIENT
Start: 2019-12-06 | End: 2020-01-28

## 2019-12-06 NOTE — TELEPHONE ENCOUNTER
Pt last seen in Allergy 8/27/2019 for  . . . Perennial allergic rhinitis with seasonal variation  (primary encounter diagnosis)  History of copd    Refill request received for .  . .    Levocetirizine Dihydrochloride (XYZAL) 5 MG Oral Tab 30 tablet 0 8/2

## 2019-12-06 NOTE — TELEPHONE ENCOUNTER
Pt contacted, informed that Xyzal was refilled for 2 month supply, Rx sent to her pharmacy. Pt informed she is due for f/u appt with Dr. Rosendo Owen 2/2020.   Pt scheduled f/u appt for 2/3/2020 at 3:30 pm.

## 2019-12-18 DIAGNOSIS — R11.0 CHRONIC NAUSEA: ICD-10-CM

## 2019-12-19 NOTE — TELEPHONE ENCOUNTER
Requested Prescriptions     Pending Prescriptions Disp Refills   • PROCHLORPERAZINE MALEATE 5 MG Oral [Pharmacy Med Name: Irvin Lipoma 5MG TAB TABLET] 30 tablet 1     Sig: TAKE ONE TABLET BY MOUTH AT BEDTIME AS NEEDED FOR NAUSEA     lov-upcoming- 3/16/20  l

## 2019-12-20 RX ORDER — PROCHLORPERAZINE MALEATE 5 MG/1
TABLET ORAL
Qty: 30 TABLET | Refills: 1 | Status: SHIPPED | OUTPATIENT
Start: 2019-12-20 | End: 2020-02-25

## 2020-01-14 DIAGNOSIS — E03.9 ACQUIRED HYPOTHYROIDISM: ICD-10-CM

## 2020-01-15 RX ORDER — ROSUVASTATIN CALCIUM 5 MG/1
5 TABLET, COATED ORAL
Qty: 90 TABLET | Refills: 1 | Status: SHIPPED | OUTPATIENT
Start: 2020-01-16 | End: 2021-02-11

## 2020-01-15 RX ORDER — LEVOTHYROXINE SODIUM 88 UG/1
88 TABLET ORAL
Qty: 90 TABLET | Refills: 1 | Status: SHIPPED | OUTPATIENT
Start: 2020-01-15 | End: 2020-07-06

## 2020-01-15 RX ORDER — FLUOXETINE HYDROCHLORIDE 20 MG/1
CAPSULE ORAL
Qty: 180 CAPSULE | Refills: 1 | Status: SHIPPED | OUTPATIENT
Start: 2020-01-15 | End: 2020-07-07

## 2020-01-16 RX ORDER — METFORMIN HYDROCHLORIDE 500 MG/1
TABLET, EXTENDED RELEASE ORAL
Qty: 360 TABLET | Refills: 0 | Status: SHIPPED | OUTPATIENT
Start: 2020-01-16 | End: 2020-12-03

## 2020-01-16 NOTE — TELEPHONE ENCOUNTER
Request for Metformin sen to provider to review, medication listed as discontinued. Refill passed per [facility], [facility] protocol.   Cholesterol Medications  Protocol Criteria:  · Appointment scheduled in the past 12 months or in the next 3 months  · ALT Nathaly Krishnan MD    Office Visit    4 months ago Perennial allergic rhinitis with seasonal variation    [facility], [facility], [address] Ames Braun, MD    Office Visit    6 months ago Weight loss    [facility], [facility], [address],

## 2020-01-28 RX ORDER — LEVOCETIRIZINE DIHYDROCHLORIDE 5 MG/1
TABLET, FILM COATED ORAL
Qty: 30 TABLET | Refills: 0 | Status: SHIPPED | OUTPATIENT
Start: 2020-01-28 | End: 2020-04-20

## 2020-01-28 NOTE — TELEPHONE ENCOUNTER
Refill requested for   LEVOCETIRIZINE DIHYDROCHLORIDE 5 MG Oral Tab 30 tablet 1 12/6/2019    Sig: Wallace Ann ONE TABLET BY MOUTH EACH EVENING         Last office visit: Consult 8/27/19-AR and H/o COPD    F/U currently scheduled? 2/3/20.        Appointment with

## 2020-02-04 ENCOUNTER — HOSPITAL ENCOUNTER (OUTPATIENT)
Dept: MAMMOGRAPHY | Facility: HOSPITAL | Age: 60
Discharge: HOME | End: 2020-02-04
Attending: INTERNAL MEDICINE
Payer: MEDICAID

## 2020-02-04 DIAGNOSIS — Z12.31 ENCOUNTER FOR SCREENING MAMMOGRAM FOR MALIGNANT NEOPLASM OF BREAST: ICD-10-CM

## 2020-02-04 PROCEDURE — 77063 BREAST TOMOSYNTHESIS BI: CPT | Performed by: INTERNAL MEDICINE

## 2020-02-04 PROCEDURE — 77067 SCR MAMMO BI INCL CAD: CPT | Performed by: INTERNAL MEDICINE

## 2020-02-15 DIAGNOSIS — R11.0 CHRONIC NAUSEA: ICD-10-CM

## 2020-02-18 RX ORDER — AZELASTINE HYDROCHLORIDE 0.5 MG/ML
SOLUTION/ DROPS OPHTHALMIC
Qty: 1 BOTTLE | Refills: 2 | OUTPATIENT
Start: 2020-02-18

## 2020-02-18 RX ORDER — MONTELUKAST SODIUM 10 MG/1
TABLET ORAL
Qty: 30 TABLET | Refills: 5 | OUTPATIENT
Start: 2020-02-18

## 2020-02-18 NOTE — TELEPHONE ENCOUNTER
Refill request denied. Patient will require follow-up appointment. Patient canceled appointment on February 3.

## 2020-02-18 NOTE — TELEPHONE ENCOUNTER
Refill requested for    Name from pharmacy: AZELASTINE 0.05% KAMALJIT DROPS, OPTH, OTIC         Will file in chart as: AZELASTINE HCL 0.05 % Ophthalmic Solution         Sig: PLACE 1 DROP INTO BOTH EYES TWICE A DAY    Disp:  1 Bottle (Pharmacy requested: 6 Undef

## 2020-02-18 NOTE — TELEPHONE ENCOUNTER
Refill request denied. Patient canceled appointment in February. Meds were refilled in December 2019 and advised to follow-up in 1 month.   Will address refills at her follow-up appointment

## 2020-02-18 NOTE — TELEPHONE ENCOUNTER
Spoke with patient, she does have a 30-day supply of Singulair. I assisted her to schedule f/u for 3/14/20 at 0945. No further questions or concerns at this time.

## 2020-02-18 NOTE — TELEPHONE ENCOUNTER
Refill requested for   Montelukast Sodium (SINGULAIR) 10 MG Oral Tab 30 tablet 5 6/21/2019 6/15/2020   Sig:   Take 1 tablet (10 mg total) by mouth daily.          Last office visit: 8/27/19-AR with COPD    Previously advised to follow up in:  Please clarify

## 2020-02-19 NOTE — TELEPHONE ENCOUNTER
RN left detailed message to notify her refill denial until seen in office for follow up. RN provided call back number if she has any further questions.

## 2020-02-25 RX ORDER — PROCHLORPERAZINE MALEATE 5 MG/1
TABLET ORAL
Qty: 30 TABLET | Refills: 3 | Status: SHIPPED | OUTPATIENT
Start: 2020-02-25 | End: 2020-12-07

## 2020-03-09 RX ORDER — LEVOCETIRIZINE DIHYDROCHLORIDE 5 MG/1
TABLET, FILM COATED ORAL
Qty: 30 TABLET | Refills: 0 | OUTPATIENT
Start: 2020-03-09

## 2020-03-09 RX ORDER — AZELASTINE HYDROCHLORIDE 0.5 MG/ML
SOLUTION/ DROPS OPHTHALMIC
Qty: 1 BOTTLE | Refills: 0 | Status: SHIPPED | OUTPATIENT
Start: 2020-03-09 | End: 2020-04-20

## 2020-03-09 NOTE — TELEPHONE ENCOUNTER
Will address  refills at her follow-up appointment later this week. Patient canceled her appointment in February. Refill request denied at this time.

## 2020-03-09 NOTE — TELEPHONE ENCOUNTER
Refill requested for   AZELASTINE HCL 0.05 % Ophthalmic Solution 1 Bottle 2 9/24/2019    Sig:   PLACE 1 DROP INTO BOTH EYES TWICE A DAY         Last office visit: 8/27/19    F/U currently scheduled? 3/14/20      Appointment with cancellation or no show?  2/

## 2020-03-09 NOTE — TELEPHONE ENCOUNTER
Refill requested for:  Name from pharmacy: Thu Howard TAB 5MG TABLET          Will file in chart as: LEVOCETIRIZINE DIHYDROCHLORIDE 5 MG Oral Tab         Sig: TAKE ONE TABLET BY MOUTH EACH EVENING    Disp:  30 tablet (Pharmacy requested: 30 Undefined)

## 2020-03-16 ENCOUNTER — OFFICE VISIT (OUTPATIENT)
Dept: GASTROENTEROLOGY | Facility: CLINIC | Age: 60
End: 2020-03-16
Payer: MEDICAID

## 2020-03-16 VITALS
DIASTOLIC BLOOD PRESSURE: 60 MMHG | HEIGHT: 66 IN | WEIGHT: 135 LBS | SYSTOLIC BLOOD PRESSURE: 95 MMHG | BODY MASS INDEX: 21.69 KG/M2 | HEART RATE: 75 BPM

## 2020-03-16 DIAGNOSIS — K21.9 GASTROESOPHAGEAL REFLUX DISEASE, ESOPHAGITIS PRESENCE NOT SPECIFIED: ICD-10-CM

## 2020-03-16 DIAGNOSIS — R11.0 NAUSEA: Primary | ICD-10-CM

## 2020-03-16 PROCEDURE — 99214 OFFICE O/P EST MOD 30 MIN: CPT | Performed by: INTERNAL MEDICINE

## 2020-03-16 RX ORDER — LISINOPRIL 2.5 MG/1
TABLET ORAL
COMMUNITY
Start: 2020-03-09 | End: 2020-05-16

## 2020-03-16 NOTE — PROGRESS NOTES
HPI:    Patient ID: Shanae Jamison is a 62year old woman who returns today for follow-up.   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 Compazine 5 mg prescribed 1/3/2019, 2/27/2019, 4/22/2019, 6/20/2019 has been very effective. Takes that regularly but not every day. There seems to be a component of GERD, dyspepsia.   Some identified triggers incl 59year old female patient of Dr. Tong Pair history of dyslipidemia, DM (on metformin), anxiety/depression, thyroid disease, current tobacco use and hx of kidney stones who initially presented 8/22/18 for colon cancer screening evaluation.  Noted e tissue-invasive fungal organisms, parasitic organisms, or bacterial organisms consistent with Helicobacter species seen on Giemsa stain (with appropriate control reactivity).     B.  Stomach; biopsy:  · Fragments of gastric mucosa demonstrating mild chronic less as she has to watch her diet for DM. States always tired. No recent blood loss (6 months). Last blood loss was hematuria due to kidney stone. She denies changes in bowels, diarrhea, significant constipation, rectal bleeding or melena.  When asked about colonoscopy examinations under safe doses of conscious sedation medication.      EGD PROCEDURE:    After the nature and risks of EGD and colonoscopy examinations under MAC anesthesia were discussed with the patient and all questions answered, informed conse duodenal mucosa. Random gastric and duodenal mucosal biopsies obtained as above. · Small internal hemorrhoids only on today's colonoscopy examination per     RECOMMENDATIONS:  · Followup above gastric mucosal biopsy results. · High-fiber diet.   · Repeat MG Oral Tab TAKE ONE TABLET BY MOUTH EACH EVENING 30 tablet 0   • METFORMIN HCL  MG Oral Tablet 24 Hr TAKE TWO TABLETS BY MOUTH EVERY 12 HOURS 360 tablet 0   • ROSUVASTATIN CALCIUM 5 MG Oral Tab TAKE 1 TABLET (5 MG TOTAL) BY MOUTH TWICE A WEEK.  90 ta ONDANSETRON 4 MG Oral Tablet Dispersible DISSOLVE 1 TABLET ON THE TONGUE EVERY 8 HOURS AS NEEDED FOR NAUSEA (Patient not taking: Reported on 3/16/2020) 10 tablet 2     Allergies:  Radiology Contrast *    HIVES, SWELLING, SHORTNESS OF

## 2020-03-22 RX ORDER — LEVOCETIRIZINE DIHYDROCHLORIDE 5 MG/1
TABLET, FILM COATED ORAL
Qty: 30 TABLET | Refills: 0 | OUTPATIENT
Start: 2020-03-22

## 2020-03-22 NOTE — TELEPHONE ENCOUNTER
Received refill request for Xyzal 5 MG- 1 tablet by mouth daily. LOV 8-27-19 for allergies. Patient cancelled appointments on 2-3-20 and 3-14-20. No appointment scheduled at this time.     Refill pended and forwarded to Dr. Colletta Leach for further direction

## 2020-03-22 NOTE — TELEPHONE ENCOUNTER
Refill request denied, pt no showed for appts in Feb and March  Will need follow up appt for refills or can buy otc

## 2020-04-01 RX ORDER — ERGOCALCIFEROL 1.25 MG/1
CAPSULE ORAL
Qty: 12 CAPSULE | Refills: 1 | Status: SHIPPED | OUTPATIENT
Start: 2020-04-01 | End: 2020-09-25

## 2020-04-02 ENCOUNTER — TELEPHONE (OUTPATIENT)
Dept: ALLERGY | Facility: CLINIC | Age: 60
End: 2020-04-02

## 2020-04-02 ENCOUNTER — VIRTUAL PHONE E/M (OUTPATIENT)
Dept: ALLERGY | Facility: CLINIC | Age: 60
End: 2020-04-02
Payer: MEDICAID

## 2020-04-02 DIAGNOSIS — R05.9 COUGH: ICD-10-CM

## 2020-04-02 DIAGNOSIS — Z87.09 HISTORY OF COPD: ICD-10-CM

## 2020-04-02 DIAGNOSIS — J30.2 PERENNIAL ALLERGIC RHINITIS WITH SEASONAL VARIATION: Primary | ICD-10-CM

## 2020-04-02 DIAGNOSIS — J30.89 PERENNIAL ALLERGIC RHINITIS WITH SEASONAL VARIATION: Primary | ICD-10-CM

## 2020-04-02 PROCEDURE — 99213 OFFICE O/P EST LOW 20 MIN: CPT | Performed by: ALLERGY & IMMUNOLOGY

## 2020-04-02 RX ORDER — MONTELUKAST SODIUM 10 MG/1
10 TABLET ORAL NIGHTLY
Qty: 30 TABLET | Refills: 0 | Status: SHIPPED | OUTPATIENT
Start: 2020-04-02 | End: 2020-04-20

## 2020-04-02 RX ORDER — FLUTICASONE PROPIONATE 50 MCG
2 SPRAY, SUSPENSION (ML) NASAL DAILY
Qty: 1 BOTTLE | Refills: 0 | Status: SHIPPED | OUTPATIENT
Start: 2020-04-02 | End: 2020-04-20

## 2020-04-02 RX ORDER — PREDNISONE 10 MG/1
TABLET ORAL
Qty: 15 TABLET | Refills: 0 | Status: SHIPPED | OUTPATIENT
Start: 2020-04-02 | End: 2021-02-24

## 2020-04-02 NOTE — TELEPHONE ENCOUNTER
RN called patient to triage symptoms. Reports her right lung painful stating, \"I've always had problems with R lung\". RN asked her to elaborate--reports that for past 8 weeks total it has been chronic, sometimes gets \"real bad\".   RN asked her to ra

## 2020-04-02 NOTE — TELEPHONE ENCOUNTER
Per patient she states that her allergy is acting up, and she is having coughing, pain in her tight lung and ears and throat itches very bad and her eyes is watery non-stop.

## 2020-04-02 NOTE — PROGRESS NOTES
Virtual/Telephone Check-In    Antoine Jordan verbally consents to a Virtual/Telephone Check-In service on 04/02/20. Patient understands and accepts financial responsibility for any deductible, co-insurance and/or co-pays associated with this service.     Du

## 2020-04-20 RX ORDER — LEVOCETIRIZINE DIHYDROCHLORIDE 5 MG/1
TABLET, FILM COATED ORAL
Qty: 30 TABLET | Refills: 0 | Status: SHIPPED | OUTPATIENT
Start: 2020-04-20 | End: 2020-05-14

## 2020-04-20 RX ORDER — FLUTICASONE PROPIONATE 50 MCG
SPRAY, SUSPENSION (ML) NASAL
Qty: 1 BOTTLE | Refills: 0 | Status: SHIPPED | OUTPATIENT
Start: 2020-04-20 | End: 2020-05-14

## 2020-04-20 RX ORDER — MONTELUKAST SODIUM 10 MG/1
TABLET ORAL
Qty: 30 TABLET | Refills: 0 | Status: SHIPPED | OUTPATIENT
Start: 2020-04-20 | End: 2020-05-14

## 2020-04-20 RX ORDER — AZELASTINE HYDROCHLORIDE 0.5 MG/ML
SOLUTION/ DROPS OPHTHALMIC
Qty: 1 BOTTLE | Refills: 0 | Status: SHIPPED | OUTPATIENT
Start: 2020-04-20 | End: 2020-05-14

## 2020-04-20 NOTE — TELEPHONE ENCOUNTER
Refill requested for:  Name from pharmacy: MONTELUKAST  TAB 10MG TABLET          Will file in chart as: MONTELUKAST SODIUM 10 MG Oral Tab         Sig: TAKE ONE TABLET BY MOUTH EACH EVENING    Disp:  30 tablet (Pharmacy requested: 30 Undefined)    Refills: Previously advised to follow up in: Not listed in last office visit note    F/U currently scheduled?: No    Date of last refill: Montelukast 04/02/2020, Flonase nasal spray 04/02/2020, Azelastine eye drops 03/09/2020, Xyzal 01/28/2020    ACTION: Routed to

## 2020-05-11 DIAGNOSIS — R80.9 TYPE 2 DIABETES MELLITUS WITH MICROALBUMINURIA, WITHOUT LONG-TERM CURRENT USE OF INSULIN (HCC): ICD-10-CM

## 2020-05-11 DIAGNOSIS — E11.29 TYPE 2 DIABETES MELLITUS WITH MICROALBUMINURIA, WITHOUT LONG-TERM CURRENT USE OF INSULIN (HCC): ICD-10-CM

## 2020-05-14 RX ORDER — AZELASTINE HYDROCHLORIDE 0.5 MG/ML
SOLUTION/ DROPS OPHTHALMIC
Qty: 1 BOTTLE | Refills: 0 | Status: SHIPPED | OUTPATIENT
Start: 2020-05-14 | End: 2020-06-10

## 2020-05-14 RX ORDER — FLUTICASONE PROPIONATE 50 MCG
SPRAY, SUSPENSION (ML) NASAL
Qty: 1 BOTTLE | Refills: 0 | Status: SHIPPED | OUTPATIENT
Start: 2020-05-14 | End: 2020-06-10

## 2020-05-14 RX ORDER — LEVOCETIRIZINE DIHYDROCHLORIDE 5 MG/1
TABLET, FILM COATED ORAL
Qty: 30 TABLET | Refills: 0 | Status: SHIPPED | OUTPATIENT
Start: 2020-05-14 | End: 2020-06-10

## 2020-05-14 RX ORDER — MONTELUKAST SODIUM 10 MG/1
TABLET ORAL
Qty: 30 TABLET | Refills: 0 | Status: SHIPPED | OUTPATIENT
Start: 2020-05-14 | End: 2020-06-10

## 2020-05-14 NOTE — TELEPHONE ENCOUNTER
Refill requested for:  Name from pharmacy: MONTELUKAST  TAB 10MG TABLET          Will file in chart as: MONTELUKAST SODIUM 10 MG Oral Tab         Sig: TAKE ONE TABLET BY MOUTH EACH EVENING    Disp:  30 tablet (Pharmacy requested: 30 Undefined)    Refills:

## 2020-05-14 NOTE — TELEPHONE ENCOUNTER
Mathew Concepcion with [facility] called and advised the patient is out of the medications noted. Is there a way we can get the prescriptions filled since the patient is now scheduled for an appointment?         Glucose Blood  USE TO TEST BLOOD GLUCOSE

## 2020-05-14 NOTE — TELEPHONE ENCOUNTER
Meds refilled x1 month.   Please call and schedule a follow-up appointment for within the next month

## 2020-05-16 RX ORDER — LISINOPRIL 2.5 MG/1
TABLET ORAL
Qty: 90 TABLET | Refills: 0 | Status: SHIPPED | OUTPATIENT
Start: 2020-05-16 | End: 2020-08-07

## 2020-05-16 RX ORDER — GLIMEPIRIDE 2 MG/1
TABLET ORAL
Qty: 180 TABLET | Refills: 0 | Status: SHIPPED | OUTPATIENT
Start: 2020-05-16 | End: 2020-08-07

## 2020-05-16 NOTE — TELEPHONE ENCOUNTER
Requested Prescriptions     Signed Prescriptions Disp Refills   • Glucose Blood (CONTOUR NEXT TEST) In Vitro Strip 300 strip 0     Sig: USE TO TEST BLOOD GLUCOSE 3 TIMES DAILY     Authorizing Provider: Kane Morejon   • LISINOPRIL 2.5 MG Oral Tab 90 table

## 2020-05-18 ENCOUNTER — VIRTUAL PHONE E/M (OUTPATIENT)
Dept: ALLERGY | Facility: CLINIC | Age: 60
End: 2020-05-18
Payer: MEDICAID

## 2020-05-18 DIAGNOSIS — Z87.09 HISTORY OF COPD: ICD-10-CM

## 2020-05-18 DIAGNOSIS — J30.89 PERENNIAL ALLERGIC RHINITIS WITH SEASONAL VARIATION: Primary | ICD-10-CM

## 2020-05-18 DIAGNOSIS — J30.2 PERENNIAL ALLERGIC RHINITIS WITH SEASONAL VARIATION: Primary | ICD-10-CM

## 2020-05-18 DIAGNOSIS — R05.9 COUGH: ICD-10-CM

## 2020-05-18 PROCEDURE — 99214 OFFICE O/P EST MOD 30 MIN: CPT | Performed by: ALLERGY & IMMUNOLOGY

## 2020-05-18 NOTE — PATIENT INSTRUCTIONS
1.ARC  Continue with Xyzal Singulair and Flonase once a day  Optivar eyedrops 1 drop per eye twice a day  Cool compresses as needed twice a day.   No prior skin testing with me in the past as patient has deferred  Reviewed avoidance measures as well as ovi

## 2020-05-18 NOTE — PROGRESS NOTES
Bijan Kaufman is a 62year old female. HPI:   No chief complaint on file.     Patient is a 24-year-old female who presents for follow-up via telephone visit due to current coronavirus pandemic    Virtual/Telephone Check-In    Bijan Kaufman verbally conse Morgan Kahn MD at [facility] ENDOSCOPY   • ESOPHAGOGASTRODUODENOSCOPY (EGD) N/A 11/20/2018    Performed by Morgan Kahn MD at [facility] ENDOSCOPY   • LITHOTRIPSY     • TONSILLECTOMY        Family History   Problem Relation Age of Onset   • He (22120 UT) Oral Cap TAKE ONE CAPSULE BY MOUTH EVERY WEEK 12 capsule 1   • PROCHLORPERAZINE MALEATE 5 MG Oral TAKE ONE TABLET BY MOUTH AT BEDTIME AS NEEDED FOR NAUSEA 30 tablet 3   • METFORMIN HCL  MG Oral Tablet 24 Hr TAKE TWO TABLETS BY MOUTH EVERY x-ray.   Other                   NAUSEA ONLY    Comment:PO antibiotics      ROS:   Allergic/Immuno:  See hpi  Cardiovascular:  Negative for irregular heartbeat/palpitations, chest pain, edema  Constitutional:  Negative night sweats,weight loss, irritability Singulair Xyzal Flonase  Consider empiric trial of Symbicort or Advair if worsening    Follow-up in the next 2 to 3 months or sooner if needed  Patient to call my office if worsening symptoms         Orders This Visit:  No orders of the defined types were

## 2020-06-10 RX ORDER — FLUTICASONE PROPIONATE 50 MCG
SPRAY, SUSPENSION (ML) NASAL
Qty: 3 BOTTLE | Refills: 0 | Status: SHIPPED | OUTPATIENT
Start: 2020-06-10 | End: 2020-09-10

## 2020-06-10 RX ORDER — MONTELUKAST SODIUM 10 MG/1
10 TABLET ORAL EVERY EVENING
Qty: 90 TABLET | Refills: 0 | Status: SHIPPED | OUTPATIENT
Start: 2020-06-10 | End: 2020-09-03

## 2020-06-10 RX ORDER — LEVOCETIRIZINE DIHYDROCHLORIDE 5 MG/1
5 TABLET, FILM COATED ORAL EVERY EVENING
Qty: 90 TABLET | Refills: 0 | Status: SHIPPED | OUTPATIENT
Start: 2020-06-10 | End: 2020-09-03

## 2020-06-10 RX ORDER — AZELASTINE HYDROCHLORIDE 0.5 MG/ML
1 SOLUTION/ DROPS OPHTHALMIC 2 TIMES DAILY
Qty: 3 BOTTLE | Refills: 0 | Status: SHIPPED | OUTPATIENT
Start: 2020-06-10 | End: 2020-09-03

## 2020-06-10 NOTE — TELEPHONE ENCOUNTER
Refill requested for Singulair, Xyzal, Astelin (Flonase refilled yesterday for 90-day supply)    Last office visit: 5/18/20 Virtually    Previously advised to follow up in:  2-3 months, sooner PRN    F/U currently scheduled?  No      Appointment with cancel

## 2020-07-06 DIAGNOSIS — E03.9 ACQUIRED HYPOTHYROIDISM: ICD-10-CM

## 2020-07-06 RX ORDER — LEVOTHYROXINE SODIUM 88 UG/1
TABLET ORAL
Qty: 90 TABLET | Refills: 0 | Status: SHIPPED | OUTPATIENT
Start: 2020-07-06 | End: 2020-09-29

## 2020-07-07 RX ORDER — FLUOXETINE HYDROCHLORIDE 20 MG/1
CAPSULE ORAL
Qty: 180 CAPSULE | Refills: 0 | Status: SHIPPED | OUTPATIENT
Start: 2020-07-07 | End: 2020-09-29

## 2020-08-04 ENCOUNTER — TELEPHONE (OUTPATIENT)
Dept: INTERNAL MEDICINE CLINIC | Facility: CLINIC | Age: 60
End: 2020-08-04

## 2020-08-04 DIAGNOSIS — E11.9 TYPE 2 DIABETES MELLITUS WITHOUT COMPLICATION, WITHOUT LONG-TERM CURRENT USE OF INSULIN (HCC): Primary | ICD-10-CM

## 2020-08-04 NOTE — TELEPHONE ENCOUNTER
Patient missed follow up virtual appointment due to some phone issues, she rescheduled for 9/1. Asking if there are any labs Dr will like her to complete.  Please advise

## 2020-08-04 NOTE — TELEPHONE ENCOUNTER
Called pt for telephone appointment at 3:10 and 3:18. Left messages twice. Advised that patient is a no-show.

## 2020-08-05 NOTE — TELEPHONE ENCOUNTER
Changed the appointment from phone to office visit per doctors request. Also informed that patient about the labs.

## 2020-08-05 NOTE — TELEPHONE ENCOUNTER
Dr. Fallon Jones, patient is schedule on 09/01/2020. Patient is requesting blood test order for appointment. Please advise.

## 2020-08-05 NOTE — TELEPHONE ENCOUNTER
Pt needs an IN PERSON office visit. I need to document her vital signs, listen to her heart and lungs, and give her a pneumonia vaccine. Please change pt's September appointment to a regular appointment. Labs ordered.   central scheduling [phone]

## 2020-08-06 DIAGNOSIS — E11.29 TYPE 2 DIABETES MELLITUS WITH MICROALBUMINURIA, WITHOUT LONG-TERM CURRENT USE OF INSULIN (HCC): ICD-10-CM

## 2020-08-06 DIAGNOSIS — R80.9 TYPE 2 DIABETES MELLITUS WITH MICROALBUMINURIA, WITHOUT LONG-TERM CURRENT USE OF INSULIN (HCC): ICD-10-CM

## 2020-08-07 RX ORDER — LISINOPRIL 2.5 MG/1
TABLET ORAL
Qty: 90 TABLET | Refills: 0 | Status: SHIPPED | OUTPATIENT
Start: 2020-08-07 | End: 2020-10-27

## 2020-08-07 RX ORDER — EZETIMIBE 10 MG/1
TABLET ORAL
Qty: 90 TABLET | Refills: 0 | Status: SHIPPED | OUTPATIENT
Start: 2020-08-07 | End: 2020-10-27

## 2020-08-07 RX ORDER — GLIMEPIRIDE 2 MG/1
TABLET ORAL
Qty: 180 TABLET | Refills: 0 | Status: SHIPPED | OUTPATIENT
Start: 2020-08-07 | End: 2020-10-27

## 2020-08-11 ENCOUNTER — APPOINTMENT (OUTPATIENT)
Dept: LAB | Facility: HOSPITAL | Age: 60
End: 2020-08-11
Attending: INTERNAL MEDICINE
Payer: MEDICAID

## 2020-08-11 DIAGNOSIS — E11.9 TYPE 2 DIABETES MELLITUS WITHOUT COMPLICATION, WITHOUT LONG-TERM CURRENT USE OF INSULIN (HCC): ICD-10-CM

## 2020-08-11 LAB
ALBUMIN SERPL-MCNC: 3.8 G/DL (ref 3.4–5)
ALBUMIN/GLOB SERPL: 1.2 {RATIO} (ref 1–2)
ALP LIVER SERPL-CCNC: 60 U/L (ref 50–130)
ALT SERPL-CCNC: 30 U/L (ref 13–56)
ANION GAP SERPL CALC-SCNC: 4 MMOL/L (ref 0–18)
AST SERPL-CCNC: 14 U/L (ref 15–37)
BILIRUB SERPL-MCNC: 0.5 MG/DL (ref 0.1–2)
BUN BLD-MCNC: 15 MG/DL (ref 7–18)
BUN/CREAT SERPL: 19.2 (ref 10–20)
CALCIUM BLD-MCNC: 9.3 MG/DL (ref 8.5–10.1)
CHLORIDE SERPL-SCNC: 105 MMOL/L (ref 98–112)
CHOLEST SMN-MCNC: 195 MG/DL (ref ?–200)
CO2 SERPL-SCNC: 32 MMOL/L (ref 21–32)
CREAT BLD-MCNC: 0.78 MG/DL (ref 0.55–1.02)
CREAT UR-SCNC: 99.1 MG/DL
EST. AVERAGE GLUCOSE BLD GHB EST-MCNC: 166 MG/DL (ref 68–126)
GLOBULIN PLAS-MCNC: 3.2 G/DL (ref 2.8–4.4)
GLUCOSE BLD-MCNC: 135 MG/DL (ref 70–99)
HBA1C MFR BLD HPLC: 7.4 % (ref ?–5.7)
HDLC SERPL-MCNC: 73 MG/DL (ref 40–59)
LDLC SERPL CALC-MCNC: 113 MG/DL (ref ?–100)
M PROTEIN MFR SERPL ELPH: 7 G/DL (ref 6.4–8.2)
MICROALBUMIN UR-MCNC: 0.7 MG/DL
MICROALBUMIN/CREAT 24H UR-RTO: 7.1 UG/MG (ref ?–30)
NONHDLC SERPL-MCNC: 122 MG/DL (ref ?–130)
OSMOLALITY SERPL CALC.SUM OF ELEC: 295 MOSM/KG (ref 275–295)
PATIENT FASTING Y/N/NP: YES
PATIENT FASTING Y/N/NP: YES
POTASSIUM SERPL-SCNC: 4.4 MMOL/L (ref 3.5–5.1)
SODIUM SERPL-SCNC: 141 MMOL/L (ref 136–145)
TRIGL SERPL-MCNC: 45 MG/DL (ref 30–149)
VIT B12 SERPL-MCNC: 495 PG/ML (ref 193–986)
VLDLC SERPL CALC-MCNC: 9 MG/DL (ref 0–30)

## 2020-08-11 PROCEDURE — 82607 VITAMIN B-12: CPT

## 2020-08-11 PROCEDURE — 80053 COMPREHEN METABOLIC PANEL: CPT

## 2020-08-11 PROCEDURE — 83036 HEMOGLOBIN GLYCOSYLATED A1C: CPT

## 2020-08-11 PROCEDURE — 80061 LIPID PANEL: CPT

## 2020-08-11 PROCEDURE — 36415 COLL VENOUS BLD VENIPUNCTURE: CPT

## 2020-08-11 PROCEDURE — 82570 ASSAY OF URINE CREATININE: CPT

## 2020-08-11 PROCEDURE — 82043 UR ALBUMIN QUANTITATIVE: CPT

## 2020-08-28 ENCOUNTER — TELEPHONE (OUTPATIENT)
Dept: GASTROENTEROLOGY | Facility: CLINIC | Age: 60
End: 2020-08-28

## 2020-08-28 NOTE — TELEPHONE ENCOUNTER
I spoke to the pt. She was with a friend all day last Friday who tested positive for COVID. I cancelled her appt for Monday and she is aware she needs to quarantine for 14 days. We rescheduled her for October  Pt hasan appt with Dr Jessie Gonzalez on 09/01/2020.  I

## 2020-08-28 NOTE — TELEPHONE ENCOUNTER
Patient states that she has been around her friend- who has been very sick. Not sure - it she has COVID 19 0 but states that she has been around her. Need OKAY for Monday 8-31-20 visit.

## 2020-08-31 ENCOUNTER — TELEPHONE (OUTPATIENT)
Dept: INTERNAL MEDICINE CLINIC | Facility: CLINIC | Age: 60
End: 2020-08-31

## 2020-08-31 NOTE — TELEPHONE ENCOUNTER
Patient was exposed to friend who tested (+) for Candis. Patient has an upcoming appt, 09/01    Patient last saw her friend on 08/21 and friend found out she tested positive on 08/24. Patient has appt with PCP 09/01.  Advised she should quarantining for

## 2020-09-03 RX ORDER — MONTELUKAST SODIUM 10 MG/1
10 TABLET ORAL EVERY EVENING
Qty: 90 TABLET | Refills: 0 | Status: SHIPPED | OUTPATIENT
Start: 2020-09-03 | End: 2020-11-12

## 2020-09-03 RX ORDER — LEVOCETIRIZINE DIHYDROCHLORIDE 5 MG/1
TABLET, FILM COATED ORAL
Qty: 90 TABLET | Refills: 0 | Status: SHIPPED | OUTPATIENT
Start: 2020-09-03 | End: 2020-11-12

## 2020-09-03 RX ORDER — AZELASTINE HYDROCHLORIDE 0.5 MG/ML
1 SOLUTION/ DROPS OPHTHALMIC 2 TIMES DAILY
Qty: 1 BOTTLE | Refills: 0 | Status: SHIPPED | OUTPATIENT
Start: 2020-09-03 | End: 2020-09-28

## 2020-09-03 NOTE — TELEPHONE ENCOUNTER
Meds refilled x3 months. Patient last seen in May 2020. Recommend follow-up by November 2020.   Please call to schedule

## 2020-09-03 NOTE — TELEPHONE ENCOUNTER
Refill requested for:  Name from pharmacy: MONTELUKAST  TAB 10MG TABLET          Will file in chart as: MONTELUKAST SODIUM 10 MG Oral Tab         Sig: Take 1 tablet (10 mg total) by mouth every evening.     Disp:  90 tablet (Pharmacy requested: 90 Undefined

## 2020-09-04 NOTE — TELEPHONE ENCOUNTER
RN spoke to patient and notified her of RX refills and need for follow up in November. Follow up scheduled for 11/4.

## 2020-09-08 ENCOUNTER — NURSE TRIAGE (OUTPATIENT)
Dept: INTERNAL MEDICINE CLINIC | Facility: CLINIC | Age: 60
End: 2020-09-08

## 2020-09-08 DIAGNOSIS — Z20.822 SUSPECTED COVID-19 VIRUS INFECTION: Primary | ICD-10-CM

## 2020-09-08 NOTE — TELEPHONE ENCOUNTER
Advised patient of Dr. Jose Miguel Anguiano note. Patient verbalized understanding  Patient refuses COVID-19 and stated, \"Dr. Julito Gurrola knows my cough and I also have a history of emphysema and COPD\". Patient states she has been having a cough for many years.      P

## 2020-09-08 NOTE — TELEPHONE ENCOUNTER
(I accidentally closed this encounter.)  Unfortunately, it is difficult to know the difference between COVID 19 and COPD.   The hospital and clinic have a policy in place which does not allow us to see patients in the office until we are sure that they do n

## 2020-09-08 NOTE — TELEPHONE ENCOUNTER
She needs a covid test for her cough. Unfortunately I will not be able to see her Thursday, because we likely will not have the covid test back by then.   I have placed the order for the covid test.  She should call central scheduling [phone] to sched

## 2020-09-08 NOTE — TELEPHONE ENCOUNTER
Advised patient of Dr. Maggie Munson note. Patient verbalized understanding  Patient agreed to have COVID-19 test completed.    Patient has number for scheduling

## 2020-09-09 ENCOUNTER — LAB ENCOUNTER (OUTPATIENT)
Dept: LAB | Facility: HOSPITAL | Age: 60
End: 2020-09-09
Attending: INTERNAL MEDICINE
Payer: MEDICAID

## 2020-09-09 DIAGNOSIS — Z20.822 SUSPECTED COVID-19 VIRUS INFECTION: ICD-10-CM

## 2020-09-10 RX ORDER — FLUTICASONE PROPIONATE 50 MCG
SPRAY, SUSPENSION (ML) NASAL
Qty: 3 BOTTLE | Refills: 0 | Status: SHIPPED | OUTPATIENT
Start: 2020-09-10 | End: 2020-11-12

## 2020-09-10 NOTE — TELEPHONE ENCOUNTER
Refill requested for   FLUTICASONE PROPIONATE 50 MCG/ACT Nasal Suspension 3 Bottle 0 6/10/2020    Sig:   USE 2 SPRAYS IN EACH NOSTRIL ONCE DAILY         Last office visit: Virtual 5/18/20    Previously advised to follow up in:  2-3 months    F/U currently

## 2020-09-11 LAB — SARS-COV-2 RNA RESP QL NAA+PROBE: NOT DETECTED

## 2020-09-25 RX ORDER — ERGOCALCIFEROL 1.25 MG/1
50000 CAPSULE ORAL WEEKLY
Qty: 12 CAPSULE | Refills: 0 | Status: SHIPPED | OUTPATIENT
Start: 2020-09-25 | End: 2020-12-26

## 2020-09-28 DIAGNOSIS — E03.9 ACQUIRED HYPOTHYROIDISM: ICD-10-CM

## 2020-09-28 RX ORDER — AZELASTINE HYDROCHLORIDE 0.5 MG/ML
1 SOLUTION/ DROPS OPHTHALMIC 2 TIMES DAILY
Qty: 1 BOTTLE | Refills: 0 | Status: SHIPPED | OUTPATIENT
Start: 2020-09-28 | End: 2020-11-05

## 2020-09-29 ENCOUNTER — HOSPITAL ENCOUNTER (EMERGENCY)
Facility: HOSPITAL | Age: 60
Discharge: HOME | End: 2020-09-30
Attending: EMERGENCY MEDICINE
Payer: MEDICAID

## 2020-09-29 ENCOUNTER — APPOINTMENT (OUTPATIENT)
Dept: GENERAL RADIOLOGY | Facility: HOSPITAL | Age: 60
End: 2020-09-29
Attending: EMERGENCY MEDICINE
Payer: MEDICAID

## 2020-09-29 DIAGNOSIS — J44.1 COPD EXACERBATION (HCC): Primary | ICD-10-CM

## 2020-09-29 PROCEDURE — 99284 EMERGENCY DEPT VISIT MOD MDM: CPT

## 2020-09-29 PROCEDURE — 80048 BASIC METABOLIC PNL TOTAL CA: CPT | Performed by: EMERGENCY MEDICINE

## 2020-09-29 PROCEDURE — 96374 THER/PROPH/DIAG INJ IV PUSH: CPT

## 2020-09-29 PROCEDURE — 71045 X-RAY EXAM CHEST 1 VIEW: CPT | Performed by: EMERGENCY MEDICINE

## 2020-09-29 PROCEDURE — 84484 ASSAY OF TROPONIN QUANT: CPT | Performed by: EMERGENCY MEDICINE

## 2020-09-29 PROCEDURE — 93005 ELECTROCARDIOGRAM TRACING: CPT

## 2020-09-29 PROCEDURE — 85025 COMPLETE CBC W/AUTO DIFF WBC: CPT | Performed by: EMERGENCY MEDICINE

## 2020-09-29 PROCEDURE — 93010 ELECTROCARDIOGRAM REPORT: CPT | Performed by: EMERGENCY MEDICINE

## 2020-09-29 RX ORDER — METHYLPREDNISOLONE SODIUM SUCCINATE 125 MG/2ML
125 INJECTION, POWDER, LYOPHILIZED, FOR SOLUTION INTRAMUSCULAR; INTRAVENOUS ONCE
Status: COMPLETED | OUTPATIENT
Start: 2020-09-29 | End: 2020-09-30

## 2020-09-29 RX ORDER — ALBUTEROL SULFATE 2.5 MG/3ML
5 SOLUTION RESPIRATORY (INHALATION) CONTINUOUS
Status: DISCONTINUED | OUTPATIENT
Start: 2020-09-29 | End: 2020-09-30

## 2020-09-29 RX ORDER — FLUOXETINE HYDROCHLORIDE 20 MG/1
40 CAPSULE ORAL EVERY MORNING
Qty: 180 CAPSULE | Refills: 0 | Status: SHIPPED | OUTPATIENT
Start: 2020-09-29 | End: 2020-12-30

## 2020-09-29 RX ORDER — LEVOTHYROXINE SODIUM 88 UG/1
TABLET ORAL
Qty: 90 TABLET | Refills: 0 | Status: SHIPPED | OUTPATIENT
Start: 2020-09-29 | End: 2020-12-19

## 2020-09-30 VITALS
RESPIRATION RATE: 18 BRPM | DIASTOLIC BLOOD PRESSURE: 64 MMHG | SYSTOLIC BLOOD PRESSURE: 135 MMHG | WEIGHT: 140 LBS | TEMPERATURE: 97 F | OXYGEN SATURATION: 99 % | BODY MASS INDEX: 23 KG/M2 | HEART RATE: 74 BPM

## 2020-09-30 PROCEDURE — 82962 GLUCOSE BLOOD TEST: CPT

## 2020-09-30 PROCEDURE — 94644 CONT INHLJ TX 1ST HOUR: CPT

## 2020-09-30 PROCEDURE — 94640 AIRWAY INHALATION TREATMENT: CPT

## 2020-09-30 RX ORDER — PREDNISONE 20 MG/1
40 TABLET ORAL DAILY
Qty: 10 TABLET | Refills: 0 | Status: SHIPPED | OUTPATIENT
Start: 2020-09-30 | End: 2020-10-05

## 2020-09-30 RX ORDER — FEXOFENADINE HYDROCHLORIDE 60 MG/1
60 TABLET, FILM COATED ORAL 2 TIMES DAILY
Qty: 30 TABLET | Refills: 0 | Status: SHIPPED | OUTPATIENT
Start: 2020-09-30 | End: 2020-10-30

## 2020-09-30 RX ORDER — ALBUTEROL SULFATE 90 UG/1
2 AEROSOL, METERED RESPIRATORY (INHALATION) EVERY 4 HOURS PRN
Qty: 1 INHALER | Refills: 0 | Status: SHIPPED | OUTPATIENT
Start: 2020-09-30 | End: 2020-10-30

## 2020-09-30 RX ORDER — BENZOCAINE AND DEXTROMETHORPHAN HYDROBROMIDE 7.5; 5 MG/1; MG/1
1 LOZENGE ORAL
Qty: 20 LOZENGE | Refills: 0 | Status: SHIPPED | OUTPATIENT
Start: 2020-09-30 | End: 2020-10-07

## 2020-09-30 NOTE — ED PROVIDER NOTES
Patient Seen in: [facility] Emergency Department      History   Patient presents with:  Shortness Of Breath  Dizziness    Stated Complaint: SOB    HPI    39-year-old female with past medical history significant for COPD, diabetes, hypothyroidism, 2018        Years since quittin.8      Smokeless tobacco: Never Used      Tobacco comment: Quit Monday    Alcohol use: No      Alcohol/week: 0.0 standard drinks    Drug use:  No             Review of Systems    Positive for stated complaint: SOB WITH PLATELET    Narrative: The following orders were created for panel order CBC WITH DIFFERENTIAL WITH PLATELET.   Procedure                               Abnormality         Status                     ---------                               --------- electronically signed and verified by the Radiologist whose name is printed above. DD:  09/30/2020/DT:  09/30/2020          MDM      Patient has normal cardiac markers and EKG. Her chest x-ray is relatively unremarkable.   Patient has normal vital signs

## 2020-09-30 NOTE — ED INITIAL ASSESSMENT (HPI)
Pt states shes sob and productive cough x 1 month and states her sputum is green, pt states she had chest pain in the middle of her chest and right lung pain x one month, denies fevers.  Pt states the last five days she is dizzy and sweaty and today she was

## 2020-10-09 ENCOUNTER — OFFICE VISIT (OUTPATIENT)
Dept: GASTROENTEROLOGY | Facility: CLINIC | Age: 60
End: 2020-10-09
Payer: MEDICAID

## 2020-10-09 VITALS
BODY MASS INDEX: 21.83 KG/M2 | HEART RATE: 84 BPM | SYSTOLIC BLOOD PRESSURE: 115 MMHG | HEIGHT: 66 IN | WEIGHT: 135.81 LBS | DIASTOLIC BLOOD PRESSURE: 70 MMHG

## 2020-10-09 DIAGNOSIS — R11.0 NAUSEA: ICD-10-CM

## 2020-10-09 DIAGNOSIS — K21.9 GASTROESOPHAGEAL REFLUX DISEASE WITHOUT ESOPHAGITIS: ICD-10-CM

## 2020-10-09 DIAGNOSIS — E11.9 TYPE 2 DIABETES MELLITUS WITHOUT COMPLICATION, WITHOUT LONG-TERM CURRENT USE OF INSULIN (HCC): Primary | ICD-10-CM

## 2020-10-09 PROCEDURE — 99214 OFFICE O/P EST MOD 30 MIN: CPT | Performed by: INTERNAL MEDICINE

## 2020-10-09 PROCEDURE — 3008F BODY MASS INDEX DOCD: CPT | Performed by: INTERNAL MEDICINE

## 2020-10-09 PROCEDURE — 3074F SYST BP LT 130 MM HG: CPT | Performed by: INTERNAL MEDICINE

## 2020-10-09 PROCEDURE — 3078F DIAST BP <80 MM HG: CPT | Performed by: INTERNAL MEDICINE

## 2020-10-09 NOTE — PROGRESS NOTES
HPI:    Patient ID: Juany Gomez returns today for follow-up. Overall Dawn Elliott is doing much better. 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 Overall symptoms are better.   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 Several visits over the past year with Moira Narayan PA-C regarding primarily nausea and poor intake, concern for weight loss. went back last night to pray at the site of the accident. Body mass index is 21.92 kg/m².      Wt Readings from Last 20 Encounters:  10/09/20 : 135 lb 12.8 oz (61.6 kg)  09/29/20 : 140 lb (63.5 kg)  03/16/20 : 135 lb (61.2 kg)  09/25/19 : 126 lb 3.2 oz previously. - No longer feels as though she is losing weight, although her appetite is intermittent with the nausea.    - She is having a flare of her pain today especially of the R shoulder.   - There is no report of changes in bowel habits, thin stools, abdominal pain that is \"liveable\" - no identified triggers nor relief factors   - Not losing weight - states maybe she overestimated weight loss  - Wondered about taking prednisone during procedure - I informed her diphenhydramine and prednisone were for of sleep apnea.   - \"Heart issues with anesthesia\" - no MI/Stroke     Pertinent Family Hx:  - No family hx of esophageal or gastric cancer  -[address] per above   - No family history of IBD.     Prior endoscopies:  - None      Social Hx:  - Occasional tobacco bulb and second, third portions duodenum. Random duodenal mucosal biopsies obtained.     COLONOSCOPY PROCEDURE:    The patient was repositioned for colonoscopy examination. Additional sedation given.   Digital rectal exam was performed, which showed no ma present demonstrating unremarkable features including preservation of the villous and glandular architecture.   · No evidence of viral inclusions, epithelioid granulomas, active/acute gastritis/duodenitis, increased intraepithelial lymphocytes, polyps, david PROCHLORPERAZINE MALEATE 5 MG Oral TAKE ONE TABLET BY MOUTH AT BEDTIME AS NEEDED FOR NAUSEA 30 tablet 3   • METFORMIN HCL  MG Oral Tablet 24 Hr TAKE TWO TABLETS BY MOUTH EVERY 12 HOURS 360 tablet 0   • ROSUVASTATIN CALCIUM 5 MG Oral Tab TAKE 1 TABLET NAUSEA ONLY    Comment:PO antibiotics  Imaging: No results found. PHYSICAL EXAM:   Physical Exam           ASSESSMENT/PLAN:   No diagnosis found. Today's plan:    1.   Type 2 diabetes  Juliet Matos has questions about further management of her diab

## 2020-10-23 ENCOUNTER — TELEPHONE (OUTPATIENT)
Dept: INTERNAL MEDICINE CLINIC | Facility: CLINIC | Age: 60
End: 2020-10-23

## 2020-10-23 NOTE — TELEPHONE ENCOUNTER
Patient reports missed call regarding test results, patient is not currently waiting for results, she is unsure who called, no notes regarding msg to call back to review results.  Patient will disregard for now, advised to check msg in case another office w

## 2020-10-26 DIAGNOSIS — E11.29 TYPE 2 DIABETES MELLITUS WITH MICROALBUMINURIA, WITHOUT LONG-TERM CURRENT USE OF INSULIN (HCC): ICD-10-CM

## 2020-10-26 DIAGNOSIS — R80.9 TYPE 2 DIABETES MELLITUS WITH MICROALBUMINURIA, WITHOUT LONG-TERM CURRENT USE OF INSULIN (HCC): ICD-10-CM

## 2020-10-27 RX ORDER — GLIMEPIRIDE 2 MG/1
2 TABLET ORAL 2 TIMES DAILY
Qty: 180 TABLET | Refills: 0 | Status: SHIPPED | OUTPATIENT
Start: 2020-10-27 | End: 2021-01-18

## 2020-10-27 RX ORDER — EZETIMIBE 10 MG/1
TABLET ORAL
Qty: 90 TABLET | Refills: 0 | Status: SHIPPED | OUTPATIENT
Start: 2020-10-27 | End: 2021-01-18

## 2020-10-27 RX ORDER — LISINOPRIL 2.5 MG/1
2.5 TABLET ORAL DAILY
Qty: 90 TABLET | Refills: 0 | Status: SHIPPED | OUTPATIENT
Start: 2020-10-27 | End: 2021-01-18

## 2020-11-04 ENCOUNTER — NURSE TRIAGE (OUTPATIENT)
Dept: INTERNAL MEDICINE CLINIC | Facility: CLINIC | Age: 60
End: 2020-11-04

## 2020-11-05 RX ORDER — AZELASTINE HYDROCHLORIDE 0.5 MG/ML
1 SOLUTION/ DROPS OPHTHALMIC 2 TIMES DAILY
Qty: 1 BOTTLE | Refills: 0 | Status: SHIPPED | OUTPATIENT
Start: 2020-11-05 | End: 2020-12-01

## 2020-11-05 NOTE — TELEPHONE ENCOUNTER
Refill requested for    Name from pharmacy: AZELASTINE 0.05% KAMALJIT DROPS, OPTH, OTIC         Will file in chart as: AZELASTINE HCL 0.05 % Ophthalmic Solution    Sig: Place 1 drop into both eyes 2 (two) times daily.     Disp:  Not specified (Pharmacy requested

## 2020-11-06 ENCOUNTER — OFFICE VISIT (OUTPATIENT)
Dept: INTERNAL MEDICINE CLINIC | Facility: CLINIC | Age: 60
End: 2020-11-06
Payer: MEDICAID

## 2020-11-06 VITALS
WEIGHT: 138 LBS | HEART RATE: 72 BPM | SYSTOLIC BLOOD PRESSURE: 107 MMHG | DIASTOLIC BLOOD PRESSURE: 69 MMHG | BODY MASS INDEX: 22.18 KG/M2 | HEIGHT: 66 IN

## 2020-11-06 DIAGNOSIS — H92.01 RIGHT EAR PAIN: Primary | ICD-10-CM

## 2020-11-06 DIAGNOSIS — G89.29 CHRONIC PAIN OF RIGHT THUMB: ICD-10-CM

## 2020-11-06 DIAGNOSIS — M79.644 CHRONIC PAIN OF RIGHT THUMB: ICD-10-CM

## 2020-11-06 PROCEDURE — 3008F BODY MASS INDEX DOCD: CPT | Performed by: NURSE PRACTITIONER

## 2020-11-06 PROCEDURE — 3074F SYST BP LT 130 MM HG: CPT | Performed by: NURSE PRACTITIONER

## 2020-11-06 PROCEDURE — 3078F DIAST BP <80 MM HG: CPT | Performed by: NURSE PRACTITIONER

## 2020-11-06 PROCEDURE — 99214 OFFICE O/P EST MOD 30 MIN: CPT | Performed by: NURSE PRACTITIONER

## 2020-11-06 RX ORDER — PREDNISONE 10 MG/1
TABLET ORAL
Qty: 30 TABLET | Refills: 0 | Status: SHIPPED | OUTPATIENT
Start: 2020-11-06 | End: 2021-03-09 | Stop reason: ALTCHOICE

## 2020-11-06 NOTE — ASSESSMENT & PLAN NOTE
A/P 58-year-old female with sudden onset of right thumb pain. Admits to following a high sugar diet. She only eats once a day. Right thumb pain is 8 out of 10 and increases with movement. Most likely arthritic pain.   Even though she is diabetic she run

## 2020-11-06 NOTE — PROGRESS NOTES
HPI:    Patient ID: Robi Urrutia is a 56year old female. HPI Right ear ache for one year. Patient is coughing up clear phloem. Patient is a smoker. Right thumb pain. 7/7.  49-year-old female complaining of right ear ache for approximately 1 year. • COPD (chronic obstructive pulmonary disease) (HCC)    • Diabetes (HCC)    • Disorder of thyroid    • High cholesterol    • Kidney stones    • PONV (postoperative nausea and vomiting)    • Rotator cuff impingement syndrome of right shoulder 9/12/2017 Musculoskeletal: Positive for back pain (Chronic). Negative for joint swelling. Skin: Negative for rash. Allergic/Immunologic: Positive for environmental allergies. Neurological: Negative for weakness, numbness and headaches.    Hematological: Does tablet 0   • PROCHLORPERAZINE MALEATE 5 MG Oral TAKE ONE TABLET BY MOUTH AT BEDTIME AS NEEDED FOR NAUSEA 30 tablet 3   • METFORMIN HCL  MG Oral Tablet 24 Hr TAKE TWO TABLETS BY MOUTH EVERY 12 HOURS 360 tablet 0   • ROSUVASTATIN CALCIUM 5 MG Oral Tab is not erythematous. Nose: Nose normal.   Mouth/Throat: Oropharynx is clear and moist.   Eyes: Pupils are equal, round, and reactive to light. Neck: No thyromegaly present.    Cardiovascular: Normal rate, regular rhythm, normal heart sounds and intact d for 15 to 20 minutes several times a day. Patient to call if symptoms do not improve         Chronic pain of right thumb     A/P 66-year-old female with sudden onset of right thumb pain. Admits to following a high sugar diet. She only eats once a day.

## 2020-11-06 NOTE — ASSESSMENT & PLAN NOTE
A/P 47-year-old female who is complained of right ear pain for approximately 1 year. Patient states she thinks the pressure and fluid is due to allergies.   She is not compliant about taking her allergy medication levocetirizine and montelukast.  Patient d

## 2020-11-12 ENCOUNTER — OFFICE VISIT (OUTPATIENT)
Dept: ALLERGY | Facility: CLINIC | Age: 60
End: 2020-11-12
Payer: MEDICAID

## 2020-11-12 VITALS
BODY MASS INDEX: 22.82 KG/M2 | HEIGHT: 65 IN | RESPIRATION RATE: 22 BRPM | WEIGHT: 137 LBS | SYSTOLIC BLOOD PRESSURE: 100 MMHG | TEMPERATURE: 98 F | OXYGEN SATURATION: 97 % | DIASTOLIC BLOOD PRESSURE: 62 MMHG | HEART RATE: 72 BPM

## 2020-11-12 DIAGNOSIS — Z87.09 HISTORY OF COPD: ICD-10-CM

## 2020-11-12 DIAGNOSIS — J30.2 PERENNIAL ALLERGIC RHINITIS WITH SEASONAL VARIATION: Primary | ICD-10-CM

## 2020-11-12 DIAGNOSIS — R05.9 COUGH: ICD-10-CM

## 2020-11-12 DIAGNOSIS — Z23 NEED FOR PNEUMOCOCCAL VACCINE: ICD-10-CM

## 2020-11-12 DIAGNOSIS — Z87.891 FORMER SMOKER: ICD-10-CM

## 2020-11-12 DIAGNOSIS — J30.89 PERENNIAL ALLERGIC RHINITIS WITH SEASONAL VARIATION: Primary | ICD-10-CM

## 2020-11-12 DIAGNOSIS — Z23 NEED FOR VACCINATION AGAINST STREPTOCOCCUS PNEUMONIAE: ICD-10-CM

## 2020-11-12 PROCEDURE — 99214 OFFICE O/P EST MOD 30 MIN: CPT | Performed by: ALLERGY & IMMUNOLOGY

## 2020-11-12 PROCEDURE — 90732 PPSV23 VACC 2 YRS+ SUBQ/IM: CPT | Performed by: ALLERGY & IMMUNOLOGY

## 2020-11-12 PROCEDURE — 3078F DIAST BP <80 MM HG: CPT | Performed by: ALLERGY & IMMUNOLOGY

## 2020-11-12 PROCEDURE — 3008F BODY MASS INDEX DOCD: CPT | Performed by: ALLERGY & IMMUNOLOGY

## 2020-11-12 PROCEDURE — 3074F SYST BP LT 130 MM HG: CPT | Performed by: ALLERGY & IMMUNOLOGY

## 2020-11-12 PROCEDURE — 90471 IMMUNIZATION ADMIN: CPT | Performed by: ALLERGY & IMMUNOLOGY

## 2020-11-12 RX ORDER — MONTELUKAST SODIUM 10 MG/1
10 TABLET ORAL EVERY EVENING
Qty: 90 TABLET | Refills: 0 | Status: SHIPPED | OUTPATIENT
Start: 2020-11-12 | End: 2021-02-11

## 2020-11-12 RX ORDER — TIOTROPIUM BROMIDE INHALATION SPRAY 1.56 UG/1
2 SPRAY, METERED RESPIRATORY (INHALATION) DAILY
Qty: 3 INHALER | Refills: 0 | Status: SHIPPED | OUTPATIENT
Start: 2020-11-12 | End: 2021-11-17

## 2020-11-12 RX ORDER — LEVOCETIRIZINE DIHYDROCHLORIDE 5 MG/1
5 TABLET, FILM COATED ORAL EVERY EVENING
Qty: 90 TABLET | Refills: 0 | Status: SHIPPED | OUTPATIENT
Start: 2020-11-12 | End: 2021-02-11

## 2020-11-12 RX ORDER — FLUTICASONE PROPIONATE 50 MCG
2 SPRAY, SUSPENSION (ML) NASAL DAILY
Qty: 3 BOTTLE | Refills: 0 | Status: SHIPPED | OUTPATIENT
Start: 2020-11-12 | End: 2020-12-01

## 2020-11-12 NOTE — PATIENT INSTRUCTIONS
1. Ar  Continue with Xyzal Singulair  Restart Flonase 2 sprays per nostril for possible postnasal drip component with her cough  Reviewed allergy avoidance measures    2.  Chronic cough   Suspect COPD component given her history of chronic smoking over 30 y

## 2020-11-12 NOTE — PROGRESS NOTES
Jarad Robb is a 56year old female. HPI:   Patient presents with: Allergies: Patient presents for 6 month follow-up for AR. Patient offers that AR has been flaring. Cough: Patient presents for 6 month follow-up.   Patient offers that chronic cough Past Surgical History:   Procedure Laterality Date   • APPENDECTOMY     • APPENDECTOMY     • COLONOSCOPY N/A 11/20/2018    Performed by Morgan Kahn MD at [facility] ENDOSCOPY   • ESOPHAGOGASTRODUODENOSCOPY (EGD) N/A 11/20/2018    Performed by HETAL tablet 0   • lisinopril 2.5 MG Oral Tab Take 1 tablet (2.5 mg total) by mouth daily.  90 tablet 0   • EZETIMIBE 10 MG Oral Tab  TAKE ONE TABLET BY MOUTH EVERY NIGHT AT BEDTIME 90 tablet 0   • LEVOTHYROXINE SODIUM 88 MCG Oral Tab  TAKE ONE TABLET BY MOUTH EV Debrox (Patient not taking: Reported on 11/12/2020 ) 15 mL 0   • baclofen 10 MG Oral Tab      • gabapentin 800 MG Oral Tab      • predniSONE 10 MG Oral Tab Take 3 tabs(30 mg) with food once a day x 5 days (Patient not taking: Reported on 11/12/2020 ) 15 ta effort  97% RA  Cardiovascular: regular rate and rhythm no murmurs, gallups, or rubs  Abdomen: soft non-tender non-distended  Skin/Hair: no unusual rashes present   Extremities: no edema, cyanosis, or clubbing     ASSESSMENT/PLAN:   Assessment   Perennial provided solely for patient education and training related to self administration of these medications. Teaching, instruction and sample was provided to the patient by myself.   Teaching included  a review of potential adverse side effects as well as poten

## 2020-11-30 ENCOUNTER — NURSE TRIAGE (OUTPATIENT)
Dept: INTERNAL MEDICINE CLINIC | Facility: CLINIC | Age: 60
End: 2020-11-30

## 2020-11-30 NOTE — TELEPHONE ENCOUNTER
Action Requested: Summary for Provider     []  Critical Lab, Recommendations Needed  [] Need Additional Advice  []   FYI    []   Need Orders  [] Need Medications Sent to Pharmacy  []  Other     SUMMARY:   Will go to the ED now.     She stated has been travis

## 2020-12-01 DIAGNOSIS — R11.0 CHRONIC NAUSEA: ICD-10-CM

## 2020-12-01 RX ORDER — FLUTICASONE PROPIONATE 50 MCG
2 SPRAY, SUSPENSION (ML) NASAL DAILY
Qty: 3 BOTTLE | Refills: 1 | Status: SHIPPED | OUTPATIENT
Start: 2020-12-01 | End: 2021-07-28

## 2020-12-01 RX ORDER — AZELASTINE HYDROCHLORIDE 0.5 MG/ML
1 SOLUTION/ DROPS OPHTHALMIC 2 TIMES DAILY
Qty: 3 BOTTLE | Refills: 1 | Status: SHIPPED | OUTPATIENT
Start: 2020-12-01 | End: 2021-06-28

## 2020-12-01 NOTE — TELEPHONE ENCOUNTER
Refill requested for:  Name from pharmacy: AZELASTINE 0.05% KAMALJIT DROPS, OPTH, OTIC          Will file in chart as: AZELASTINE HCL 0.05 % Ophthalmic Solution    Sig: Place 1 drop into both eyes 2 (two) times daily.     Disp:  Not specified (Pharmacy requested

## 2020-12-01 NOTE — TELEPHONE ENCOUNTER
Per chart review, pt did not present to an [facility] ER. Please call pt to see if she went elsewhere or how she is doing.

## 2020-12-02 NOTE — TELEPHONE ENCOUNTER
Requested Prescriptions     Pending Prescriptions Disp Refills   • PROCHLORPERAZINE MALEATE 5 MG Oral [Pharmacy Med Name: Mary Lou Ramos 5MG TAB TABLET] 30 tablet 3     Sig: TAKE ONE TABLET BY MOUTH AT BEDTIME AS NEEDED FOR NAUSEA   LR: 2/25/2020  LOV: 10/9/2

## 2020-12-03 ENCOUNTER — TELEMEDICINE (OUTPATIENT)
Dept: INTERNAL MEDICINE CLINIC | Facility: CLINIC | Age: 60
End: 2020-12-03
Payer: MEDICAID

## 2020-12-03 DIAGNOSIS — R05.9 COUGH: Primary | ICD-10-CM

## 2020-12-03 DIAGNOSIS — F17.200 SMOKER: ICD-10-CM

## 2020-12-03 DIAGNOSIS — E11.65 UNCONTROLLED TYPE 2 DIABETES MELLITUS WITH HYPERGLYCEMIA, WITHOUT LONG-TERM CURRENT USE OF INSULIN (HCC): ICD-10-CM

## 2020-12-03 DIAGNOSIS — R53.83 FATIGUE, UNSPECIFIED TYPE: ICD-10-CM

## 2020-12-03 PROBLEM — Z87.891 EX-SMOKER: Status: RESOLVED | Noted: 2019-05-07 | Resolved: 2020-12-03

## 2020-12-03 PROCEDURE — 99214 OFFICE O/P EST MOD 30 MIN: CPT | Performed by: INTERNAL MEDICINE

## 2020-12-03 NOTE — TELEPHONE ENCOUNTER
Pt called today, stated she did not go to ER because she was \"too sick \", feels a little better, right ear still clogged, head ache-sinus pressure , deep congested cough- Pt is a smoker,phlegm is clear , drinking fluids ,using her inhaler/xyzal  Advised

## 2020-12-03 NOTE — PATIENT INSTRUCTIONS
Please schedule your next appointment in March. Do blood tests in January. You do not need to fast.    Please schedule your blood test by calling central registration at [phone] or go to [email or web address].   The lab prefers appointments, but will

## 2020-12-03 NOTE — PROGRESS NOTES
Video Progress Note  Telehealth Verbal Consent   I conducted a telehealth visit with Rg Come today, 12/03/20, which was completed using two-way, real-time interactive audio and video communication.  This has been done in good shelbie to provide continui include no fever, myalgias, sore throat, shortness of breath or wheezing. Nothing aggravates the symptoms. Risk factors for lung disease include smoking/tobacco exposure.        Past Surgical History:   Procedure Laterality Date   • APPENDECTOMY     • APPEN LEVOTHYROXINE SODIUM 88 MCG Oral Tab  TAKE ONE TABLET BY MOUTH EVERY MORNING BEFORE BREAKFAST 90 tablet 0   • FLUoxetine HCl 20 MG Oral Cap Take 2 capsules (40 mg total) by mouth every morning.  180 capsule 0   • ergocalciferol 1.25 MG (31210 UT) Oral Cap T x-ray.   Other                   NAUSEA ONLY    Comment:PO antibiotics     Social History    Tobacco Use      Smoking status: Current Some Day Smoker        Years: 45.00        Types: Cigarettes        Quit date: 11/12/2018        Years since [employer] - Primary     Patient has a significant cough which is worse than her chronic cough. Advised patient that they may have COVID-19. Test ordered.   Advised patient to drink fluids, take Tylenol for fever, rest, and quarantine at home for until she gets the

## 2020-12-03 NOTE — ASSESSMENT & PLAN NOTE
Patient has a significant cough which is worse than her chronic cough. Advised patient that they may have COVID-19. Test ordered.   Advised patient to drink fluids, take Tylenol for fever, rest, and quarantine at home for until she gets the results of th

## 2020-12-03 NOTE — ASSESSMENT & PLAN NOTE
The patient's A1c was 7.2 in August.  She will schedule another one. I urged her to schedule an appointment with the eye doctor. Continue current medications.

## 2020-12-03 NOTE — ASSESSMENT & PLAN NOTE
Patient has resumed smoking which she says is due to the stress of the pandemic. I urged her to quit. She says she will try using the nicotine patch.

## 2020-12-03 NOTE — TELEPHONE ENCOUNTER
baljinder Brar attempted to reach the patient after our ER disposition but she has not returned our calls when we noted she did not have an ER visit.

## 2020-12-04 ENCOUNTER — LAB ENCOUNTER (OUTPATIENT)
Dept: LAB | Facility: HOSPITAL | Age: 60
End: 2020-12-04
Attending: INTERNAL MEDICINE
Payer: MEDICAID

## 2020-12-04 DIAGNOSIS — R05.9 COUGH: ICD-10-CM

## 2020-12-07 ENCOUNTER — TELEPHONE (OUTPATIENT)
Dept: INTERNAL MEDICINE CLINIC | Facility: CLINIC | Age: 60
End: 2020-12-07

## 2020-12-07 RX ORDER — PROCHLORPERAZINE MALEATE 5 MG/1
TABLET ORAL
Qty: 30 TABLET | Refills: 3 | Status: SHIPPED | OUTPATIENT
Start: 2020-12-07 | End: 2021-04-06

## 2020-12-07 NOTE — TELEPHONE ENCOUNTER
Sanjay Mario MD   12/6/2020 10:26 AM      Please call pt:  Her COVID 19 test was negative. Informed of result message.

## 2020-12-15 ENCOUNTER — TELEMEDICINE (OUTPATIENT)
Dept: INTERNAL MEDICINE CLINIC | Facility: CLINIC | Age: 60
End: 2020-12-15
Payer: MEDICAID

## 2020-12-15 DIAGNOSIS — E11.9 TYPE 2 DIABETES MELLITUS WITHOUT COMPLICATION, WITHOUT LONG-TERM CURRENT USE OF INSULIN (HCC): ICD-10-CM

## 2020-12-15 DIAGNOSIS — F17.200 SMOKER: ICD-10-CM

## 2020-12-15 DIAGNOSIS — R05.9 COUGH: Primary | ICD-10-CM

## 2020-12-15 PROCEDURE — 99214 OFFICE O/P EST MOD 30 MIN: CPT | Performed by: INTERNAL MEDICINE

## 2020-12-15 RX ORDER — AZITHROMYCIN 250 MG/1
TABLET, FILM COATED ORAL
Qty: 6 TABLET | Refills: 0 | Status: SHIPPED | OUTPATIENT
Start: 2020-12-15 | End: 2020-12-20

## 2020-12-15 NOTE — PATIENT INSTRUCTIONS
Please schedule an appointment with the eye doctor. Please schedule your next appointment in March for a physical.  Do blood tests.   You do not need to fast.     Please schedule your blood test by calling central registration at [phone] or go to EE

## 2020-12-15 NOTE — PROGRESS NOTES
Video Progress Note  Telehealth Verbal Consent   I conducted a telehealth visit with Walt  today, 12/15/20, which was completed using two-way, real-time interactive audio and video communication.  This has been done in good shelbie to provide continui headaches, postnasal drip and wheezing. Pertinent negatives include no fever, myalgias, sore throat or shortness of breath. Nothing aggravates the symptoms. Risk factors for lung disease include smoking/tobacco exposure.  Her past medical history is signifi daily for 3 days, 1 tab daily for 3 days. (Patient not taking: Reported on 11/12/2020 ) 30 tablet 0   • glimepiride 2 MG Oral Tab Take 1 tablet (2 mg total) by mouth 2 (two) times daily.  180 tablet 0   • lisinopril 2.5 MG Oral Tab Take 1 tablet (2.5 mg tot LANCETS 33G Does not apply Misc      • OxyCODONE HCl IR 30 MG Oral Tab Take 30 mg by mouth as needed.     0       Allergies:  Radiology Contrast *    HIVES, SWELLING, SHORTNESS OF                            BREATH    Comment:Had some kind of contrast many y ASSESSMENT/PLAN:     Problem List Items Addressed This Visit        High    Cough - Primary     Will check a cxr, Rx z-ameena. Pt will call back if not better in 1-2 weeks.          Relevant Medications    azithromycin (ZITHROMAX Z-AMEENA) 250 MG Oral Tab    O

## 2020-12-19 DIAGNOSIS — E03.9 ACQUIRED HYPOTHYROIDISM: ICD-10-CM

## 2020-12-19 RX ORDER — LEVOTHYROXINE SODIUM 88 UG/1
88 TABLET ORAL
Qty: 30 TABLET | Refills: 0 | Status: SHIPPED | OUTPATIENT
Start: 2020-12-19 | End: 2021-01-18

## 2020-12-26 RX ORDER — ERGOCALCIFEROL 1.25 MG/1
50000 CAPSULE ORAL WEEKLY
Qty: 12 CAPSULE | Refills: 0 | Status: SHIPPED | OUTPATIENT
Start: 2020-12-26 | End: 2021-03-11

## 2020-12-30 RX ORDER — FLUOXETINE HYDROCHLORIDE 20 MG/1
40 CAPSULE ORAL EVERY MORNING
Qty: 180 CAPSULE | Refills: 0 | Status: SHIPPED | OUTPATIENT
Start: 2020-12-30 | End: 2021-03-18

## 2021-01-05 ENCOUNTER — LAB ENCOUNTER (OUTPATIENT)
Dept: LAB | Facility: HOSPITAL | Age: 61
End: 2021-01-05
Attending: INTERNAL MEDICINE
Payer: MEDICAID

## 2021-01-05 ENCOUNTER — NURSE TRIAGE (OUTPATIENT)
Dept: INTERNAL MEDICINE CLINIC | Facility: CLINIC | Age: 61
End: 2021-01-05

## 2021-01-05 DIAGNOSIS — Z20.822 SUSPECTED COVID-19 VIRUS INFECTION: ICD-10-CM

## 2021-01-05 DIAGNOSIS — Z20.822 SUSPECTED COVID-19 VIRUS INFECTION: Primary | ICD-10-CM

## 2021-01-05 NOTE — TELEPHONE ENCOUNTER
Order signed. Pt should call central scheduling [phone] to schedule the test.  Agree with advice given by RN.

## 2021-01-07 LAB — SARS-COV-2 RNA RESP QL NAA+PROBE: DETECTED

## 2021-01-08 ENCOUNTER — TELEPHONE (OUTPATIENT)
Dept: INTERNAL MEDICINE CLINIC | Facility: CLINIC | Age: 61
End: 2021-01-08

## 2021-01-08 RX ORDER — CODEINE PHOSPHATE AND GUAIFENESIN 10; 100 MG/5ML; MG/5ML
5 SOLUTION ORAL EVERY 6 HOURS PRN
Qty: 180 ML | Refills: 0 | Status: SHIPPED | OUTPATIENT
Start: 2021-01-08 | End: 2021-01-08

## 2021-01-08 RX ORDER — CODEINE PHOSPHATE AND GUAIFENESIN 10; 100 MG/5ML; MG/5ML
5 SOLUTION ORAL EVERY 6 HOURS PRN
Qty: 180 ML | Refills: 0 | Status: SHIPPED | OUTPATIENT
Start: 2021-01-08 | End: 2021-01-18

## 2021-01-08 NOTE — TELEPHONE ENCOUNTER
Dr. Herson Mendez would you be able to please clarify, does patient need a note excusing her from work or what note is that patient referring to?

## 2021-01-08 NOTE — TELEPHONE ENCOUNTER
Please see pt's request to have a note and results mailed to her. Rx sent for robitussin with codeine.

## 2021-01-08 NOTE — TELEPHONE ENCOUNTER
Advised pt of providers note below. Pt requesting to have a note and results mailed to her house since she is supposed to be moving. In office staff please assist and mail to address on file-verified.       Pt states she is coughing a lot and keeps her up

## 2021-01-08 NOTE — TELEPHONE ENCOUNTER
Left message to callback-left message that she is positive for COVID. =first attempt. No MyChart. TRIAGE team will do home monitoring   PLEASE DO NOT CLOSE THIS ENCOUNTER.

## 2021-01-08 NOTE — TELEPHONE ENCOUNTER
Per patient she just needs results that states that she was positive, copy of lab results mailed to home  Address verified with patient

## 2021-01-08 NOTE — TELEPHONE ENCOUNTER
Please call the pt. She says she needs a note for moving. She can end her quarantine on Monday January 11 as long as she has not had a fever for 3 days and her symptoms are improving.   Perhaps she was supposed to move this weekend and can no longer do th

## 2021-01-08 NOTE — TELEPHONE ENCOUNTER
----- Message from Vandana Harrell MD sent at 1/8/2021 10:09 AM CST -----  Please call pt: Your COVID test is positive.   You should isolate away form any household members as much as possible and the general public COMPLETELY until 3 days after symptoms re

## 2021-01-08 NOTE — TELEPHONE ENCOUNTER
Advised of providers message below. What  was your temp today? - 97.2     How did you take your temp?     with an oral thermometer    Are you feeling short of breath today?    No      Is the shortness of breath better, the same, or worse than yesterda

## 2021-01-08 NOTE — PROGRESS NOTES
Please call pt: Your COVID test is positive. You should isolate away form any household members as much as possible and the general public COMPLETELY until 3 days after symptoms resolve, or 10 days since symptoms started (whichever is longer).  If you can

## 2021-01-11 NOTE — TELEPHONE ENCOUNTER
Left message to call back=-first attempt. No MyChart. TRIAGE team will do home monitoring   PLEASE DO NOT CLOSE THIS ENCOUNTER.

## 2021-01-11 NOTE — TELEPHONE ENCOUNTER
Spoke with pt,  verified, pt is getting better, she has 50 % improvement. What  was your temp today? None    How did you take your temp? N/a    Are you feeling short of breath today?  No    Is the shortness of breath better, the same, or worse than y

## 2021-01-13 ENCOUNTER — VIRTUAL PHONE E/M (OUTPATIENT)
Dept: INTERNAL MEDICINE CLINIC | Facility: CLINIC | Age: 61
End: 2021-01-13
Payer: MEDICAID

## 2021-01-13 DIAGNOSIS — U07.1 COVID-19: Primary | ICD-10-CM

## 2021-01-13 PROCEDURE — 99213 OFFICE O/P EST LOW 20 MIN: CPT | Performed by: INTERNAL MEDICINE

## 2021-01-13 NOTE — TELEPHONE ENCOUNTER
What was your temp today? - 97.1    How did you take your temp?     with an oral thermometer    Are you feeling short of breath today? No      Is the shortness of breath better, the same, or worse than yesterday?   n/a    Are you having a cough today? PM Riky Serna MD [facility]   4/16/2021  1:00 PM Verito Brandon MD [facility] ([facility])  RIKI

## 2021-01-13 NOTE — PROGRESS NOTES
HPI:    Patient ID: Ines Bowen is a 56year old female. Virtual/Telephone Check-In    Ines Bowen verbally consents to a Virtual/Telephone Check-In service on 01/13/21.   Patient has been referred to the [facility] website at [email or web address] to rev • Disorder of thyroid    • High cholesterol    • Kidney stones    • PONV (postoperative nausea and vomiting)    • Rotator cuff impingement syndrome of right shoulder 9/12/2017      Past Surgical History:   Procedure Laterality Date   • APPENDECTOMY     • before breakfast. 12/19/2020 PLEASE ASK PT TO DO BLOOD TEST for thyroid level.  30 tablet 0   • PROCHLORPERAZINE MALEATE 5 MG Oral TAKE ONE TABLET BY MOUTH AT BEDTIME AS NEEDED FOR NAUSEA 30 tablet 3   • Azelastine HCl 0.05 % Ophthalmic Solution Place 1 derik lungs every 6 (six) hours as needed for Wheezing (cough). 1 Inhaler 0   • LORazepam 0.5 MG Oral Tab Take 0.5 mg by mouth every 4 (four) hours as needed for Anxiety.      • Blood Glucose Monitoring Suppl Supplies Does not apply Misc Please supply patient wit she can go to the emergency room. She questions whether or not she should take an aspirin a day to avoid blood clots.   I told her that it would be safe to take an 81 mg aspirin a day with there is not a lot of evidence that this actually prevents blood cl

## 2021-01-15 NOTE — TELEPHONE ENCOUNTER
What was your temp today? - denies fever     How did you take your temp?     without a thermometer    Are you feeling short of breath today?    No      Is the shortness of breath better, the same, or worse than yesterday?   n/a    Are you having a cough tod

## 2021-01-18 DIAGNOSIS — E11.29 TYPE 2 DIABETES MELLITUS WITH MICROALBUMINURIA, WITHOUT LONG-TERM CURRENT USE OF INSULIN (HCC): ICD-10-CM

## 2021-01-18 DIAGNOSIS — E03.9 ACQUIRED HYPOTHYROIDISM: ICD-10-CM

## 2021-01-18 DIAGNOSIS — R80.9 TYPE 2 DIABETES MELLITUS WITH MICROALBUMINURIA, WITHOUT LONG-TERM CURRENT USE OF INSULIN (HCC): ICD-10-CM

## 2021-01-18 RX ORDER — LEVOTHYROXINE SODIUM 88 UG/1
88 TABLET ORAL
Qty: 30 TABLET | Refills: 0 | Status: SHIPPED | OUTPATIENT
Start: 2021-01-18 | End: 2021-02-17

## 2021-01-18 RX ORDER — GLIMEPIRIDE 2 MG/1
TABLET ORAL
Qty: 180 TABLET | Refills: 0 | Status: SHIPPED | OUTPATIENT
Start: 2021-01-18 | End: 2021-02-24

## 2021-01-18 RX ORDER — LISINOPRIL 2.5 MG/1
TABLET ORAL
Qty: 90 TABLET | Refills: 0 | Status: SHIPPED | OUTPATIENT
Start: 2021-01-18 | End: 2021-05-11

## 2021-01-18 RX ORDER — EZETIMIBE 10 MG/1
TABLET ORAL
Qty: 90 TABLET | Refills: 0 | Status: SHIPPED | OUTPATIENT
Start: 2021-01-18 | End: 2021-04-15

## 2021-02-11 RX ORDER — LEVOCETIRIZINE DIHYDROCHLORIDE 5 MG/1
TABLET, FILM COATED ORAL
Qty: 90 TABLET | Refills: 0 | Status: SHIPPED | OUTPATIENT
Start: 2021-02-11 | End: 2021-11-09

## 2021-02-11 RX ORDER — ROSUVASTATIN CALCIUM 5 MG/1
5 TABLET, COATED ORAL
Qty: 90 TABLET | Refills: 1 | Status: SHIPPED | OUTPATIENT
Start: 2021-02-11 | End: 2021-10-21

## 2021-02-11 RX ORDER — MONTELUKAST SODIUM 10 MG/1
TABLET ORAL
Qty: 90 TABLET | Refills: 0 | Status: SHIPPED | OUTPATIENT
Start: 2021-02-11 | End: 2021-11-15

## 2021-02-11 NOTE — TELEPHONE ENCOUNTER
Refill requested for   Montelukast Sodium 10 MG Oral Tab 90 tablet 0 11/12/2020     Sig - Route:  Take 1 tablet (10 mg total) by mouth every evening. - Oral    Sent to pharmacy as: Montelukast Sodium 10 MG Oral Tablet (SINGULAIR)    E-Prescribing Status: Re

## 2021-02-15 DIAGNOSIS — E03.9 ACQUIRED HYPOTHYROIDISM: ICD-10-CM

## 2021-02-15 RX ORDER — FLUTICASONE PROPIONATE 50 MCG
SPRAY, SUSPENSION (ML) NASAL
Refills: 0 | OUTPATIENT
Start: 2021-02-15

## 2021-02-15 NOTE — TELEPHONE ENCOUNTER
Refill requested for   Fluticasone Propionate 50 MCG/ACT Nasal Suspension 3 Bottle 1 2020    Si sprays by Nasal route daily. Date of last refill: 21    ACTION: Rx refused wither reference to 20 rx for total 6 month supply.

## 2021-02-16 NOTE — TELEPHONE ENCOUNTER
The patient is very overdue for her thyroid level. I have ordered these tests. She does not need to fast.  Please have her do these tests so that I can refill her thyroid medication.   It is not good for me to continue to refill the medication if the dose

## 2021-02-17 RX ORDER — LEVOTHYROXINE SODIUM 88 UG/1
88 TABLET ORAL
Qty: 15 TABLET | Refills: 0 | Status: SHIPPED | OUTPATIENT
Start: 2021-02-17 | End: 2021-02-24

## 2021-02-17 NOTE — TELEPHONE ENCOUNTER
Called pt to inform that labs are over due and they need to be completed prior to Dr. Angel Sweet refilling medication. Per patient she has been studying a lot and has not been able to go.   Patient informed that labs are required prior to having medication re

## 2021-02-23 ENCOUNTER — HOSPITAL ENCOUNTER (OUTPATIENT)
Dept: GENERAL RADIOLOGY | Facility: HOSPITAL | Age: 61
Discharge: HOME | End: 2021-02-23
Attending: INTERNAL MEDICINE
Payer: MEDICAID

## 2021-02-23 ENCOUNTER — LAB ENCOUNTER (OUTPATIENT)
Dept: LAB | Facility: HOSPITAL | Age: 61
End: 2021-02-23
Attending: INTERNAL MEDICINE
Payer: MEDICAID

## 2021-02-23 DIAGNOSIS — R05.9 COUGH: ICD-10-CM

## 2021-02-23 DIAGNOSIS — R53.83 FATIGUE, UNSPECIFIED TYPE: ICD-10-CM

## 2021-02-23 DIAGNOSIS — E11.65 UNCONTROLLED TYPE 2 DIABETES MELLITUS WITH HYPERGLYCEMIA, WITHOUT LONG-TERM CURRENT USE OF INSULIN (HCC): ICD-10-CM

## 2021-02-23 LAB
EST. AVERAGE GLUCOSE BLD GHB EST-MCNC: 183 MG/DL (ref 68–126)
HBA1C MFR BLD HPLC: 8 % (ref ?–5.7)
TSI SER-ACNC: 2.52 MIU/ML (ref 0.36–3.74)
VIT B12 SERPL-MCNC: 485 PG/ML (ref 193–986)

## 2021-02-23 PROCEDURE — 36415 COLL VENOUS BLD VENIPUNCTURE: CPT

## 2021-02-23 PROCEDURE — 71046 X-RAY EXAM CHEST 2 VIEWS: CPT | Performed by: INTERNAL MEDICINE

## 2021-02-23 PROCEDURE — 3052F HG A1C>EQUAL 8.0%<EQUAL 9.0%: CPT | Performed by: NURSE PRACTITIONER

## 2021-02-23 PROCEDURE — 84443 ASSAY THYROID STIM HORMONE: CPT

## 2021-02-23 PROCEDURE — 82607 VITAMIN B-12: CPT

## 2021-02-23 PROCEDURE — 83036 HEMOGLOBIN GLYCOSYLATED A1C: CPT

## 2021-02-24 NOTE — PROGRESS NOTES
Please call pt: Your A1C went up. I would like to increase your glimepiride to 4 mg twice daily. Please schedule a follow-up appointment at your earliest convenience. Your thyroid level is normal.  Continue current thyroid dose.   Your B12 level is jasiel

## 2021-02-25 ENCOUNTER — TELEPHONE (OUTPATIENT)
Dept: INTERNAL MEDICINE CLINIC | Facility: CLINIC | Age: 61
End: 2021-02-25

## 2021-02-25 NOTE — TELEPHONE ENCOUNTER
Patient was advised of 's note below. Patient would like to schedule follow up appointment in March. No available appointments. Please advise if OK to use res 24 spot.                 Ebony Lund MD  P Em Rn Triage             Please call pt:

## 2021-03-09 ENCOUNTER — OFFICE VISIT (OUTPATIENT)
Dept: INTERNAL MEDICINE CLINIC | Facility: CLINIC | Age: 61
End: 2021-03-09
Payer: MEDICAID

## 2021-03-09 VITALS
HEIGHT: 65 IN | SYSTOLIC BLOOD PRESSURE: 108 MMHG | WEIGHT: 142 LBS | BODY MASS INDEX: 23.66 KG/M2 | HEART RATE: 81 BPM | DIASTOLIC BLOOD PRESSURE: 66 MMHG

## 2021-03-09 DIAGNOSIS — F32.A MILD DEPRESSION: ICD-10-CM

## 2021-03-09 DIAGNOSIS — H92.01 RIGHT EAR PAIN: ICD-10-CM

## 2021-03-09 DIAGNOSIS — E11.65 UNCONTROLLED TYPE 2 DIABETES MELLITUS WITH HYPERGLYCEMIA, WITHOUT LONG-TERM CURRENT USE OF INSULIN (HCC): Primary | ICD-10-CM

## 2021-03-09 DIAGNOSIS — M79.641 PAIN OF RIGHT HAND: ICD-10-CM

## 2021-03-09 DIAGNOSIS — Z12.31 BREAST CANCER SCREENING BY MAMMOGRAM: ICD-10-CM

## 2021-03-09 PROCEDURE — 99215 OFFICE O/P EST HI 40 MIN: CPT | Performed by: INTERNAL MEDICINE

## 2021-03-09 PROCEDURE — 3074F SYST BP LT 130 MM HG: CPT | Performed by: INTERNAL MEDICINE

## 2021-03-09 PROCEDURE — 3008F BODY MASS INDEX DOCD: CPT | Performed by: INTERNAL MEDICINE

## 2021-03-09 PROCEDURE — 3078F DIAST BP <80 MM HG: CPT | Performed by: INTERNAL MEDICINE

## 2021-03-09 NOTE — PROGRESS NOTES
HPI:    Patient ID: Ellie Uribe is a 56year old female. HPI:  Her A1C went up to 8.0. She didn't increase the glimepiride because she feels that it makes her lightheaded. She was eating lots of carbs and she has stopped doing that.     Pt c/o right morning. 180 capsule 0   • ERGOCALCIFEROL 1.25 MG (15707 UT) Oral Cap TAKE 1 CAPSULE (50,000 UNITS TOTAL) BY MOUTH ONCE A WEEK.  12 capsule 0   • PROCHLORPERAZINE MALEATE 5 MG Oral TAKE ONE TABLET BY MOUTH AT BEDTIME AS NEEDED FOR NAUSEA 30 tablet 3   • Aze Quit date: 2018        Years since quittin.3      Smokeless tobacco: Never Used      Tobacco comment: quit then starts smoking someday    Vaping Use      Vaping Use: Never used    Alcohol use: No      Alcohol/week: 0.0 standard drinks    Drug use normal.         Thought Content:  Thought content normal.                 ASSESSMENT/PLAN:     Problem List Items Addressed This Visit        High    Uncontrolled type 2 diabetes mellitus with hyperglycemia, without long-term current use of insulin ([facility] [address] -

## 2021-03-10 NOTE — ASSESSMENT & PLAN NOTE
The patient's depression is not well controlled on her current medication. I will refer her for therapy and also for psychiatry.

## 2021-03-10 NOTE — ASSESSMENT & PLAN NOTE
This is likely due to tendinitis of her thumb. I will refer to rheumatology for further evaluation and treatment.

## 2021-03-10 NOTE — ASSESSMENT & PLAN NOTE
The patient's discomfort may be due to impacted cerumen, however she states that Debrox makes her dizzy. I urged her to try using Debrox again and to see ENT.

## 2021-03-10 NOTE — ASSESSMENT & PLAN NOTE
Patient does not want to take a higher dose of glimepiride. She states she would like to get off the glimepiride and take something else, however she does not tolerate Metformin. She states that she can drastically improve her diet.   I will refer her to

## 2021-03-11 RX ORDER — ERGOCALCIFEROL 1.25 MG/1
CAPSULE ORAL
Qty: 12 CAPSULE | Refills: 0 | Status: SHIPPED | OUTPATIENT
Start: 2021-03-11 | End: 2021-10-22

## 2021-03-17 ENCOUNTER — OFFICE VISIT (OUTPATIENT)
Dept: RHEUMATOLOGY | Facility: CLINIC | Age: 61
End: 2021-03-17
Payer: MEDICAID

## 2021-03-17 VITALS
SYSTOLIC BLOOD PRESSURE: 109 MMHG | HEART RATE: 74 BPM | WEIGHT: 142 LBS | BODY MASS INDEX: 23.66 KG/M2 | DIASTOLIC BLOOD PRESSURE: 62 MMHG | HEIGHT: 65 IN

## 2021-03-17 DIAGNOSIS — Z87.2 HX OF PSORIASIS: ICD-10-CM

## 2021-03-17 DIAGNOSIS — M79.644 PAIN OF RIGHT THUMB: Primary | ICD-10-CM

## 2021-03-17 PROCEDURE — 3008F BODY MASS INDEX DOCD: CPT | Performed by: INTERNAL MEDICINE

## 2021-03-17 PROCEDURE — 3074F SYST BP LT 130 MM HG: CPT | Performed by: INTERNAL MEDICINE

## 2021-03-17 PROCEDURE — 99244 OFF/OP CNSLTJ NEW/EST MOD 40: CPT | Performed by: INTERNAL MEDICINE

## 2021-03-17 PROCEDURE — 3078F DIAST BP <80 MM HG: CPT | Performed by: INTERNAL MEDICINE

## 2021-03-17 RX ORDER — METHYLPREDNISOLONE 4 MG/1
TABLET ORAL
Qty: 1 PACKAGE | Refills: 0 | Status: SHIPPED | OUTPATIENT
Start: 2021-03-17 | End: 2021-05-11 | Stop reason: ALTCHOICE

## 2021-03-17 NOTE — PATIENT INSTRUCTIONS
You were seen today for right thumb pain likely due to trigger finger which is inflammation of the tendon  You do have a history of psoriasis and it could also cause joint pain and swelling.   We get some blood work to evaluate for any autoimmune disease bu

## 2021-03-17 NOTE — PROGRESS NOTES
Fransisca Stringer is a 56year old female who presents for Patient presents with:  Consult  Hand Pain: right hand pain, swelling  .    HPI:   CC: R thumb pain  Consult: referred by PCP Dr. Xavier Varghese    This is a 55 yo F with hx of HLD, DM-2, Hypothyroid, Nephroli capsule 0   • glimepiride 4 MG Oral Tab Take 1 tablet (4 mg total) by mouth 2 (two) times daily.  180 tablet 0   • Levothyroxine Sodium 88 MCG Oral Tab Take 1 tablet (88 mcg total) by mouth before breakfast. 90 tablet 1   • ROSUVASTATIN CALCIUM 5 MG Oral Ta SINGLE USE REGULAR) Does not apply Pads      • Blood Glucose Monitoring Suppl (TRUE METRIX METER) W/DEVICE Does not apply Kit As needed   0   • TRUEPLUS LANCETS 33G Does not apply Misc      • OxyCODONE HCl IR 30 MG Oral Tab Take 30 mg by mouth as needed. ulcers, no alopecia, no photosensitivity, no psoriasis  HEENT: No dry eyes, no dry mouth, no Raynaud's, no nasal ulcers, no parotid swelling, no neck pain, no jaw pain, no temple pain  Eyes: No visual changes,   CVS: No chest pain, no heart disease  RS: No extremities, sensation intact.   PSYCH: normal mood    LABS:     Component      Latest Ref Rng & Units 9/29/2020   WBC      4.0 - 11.0 x10(3) uL 6.3   RBC      3.80 - 5.30 x10(6)uL 4.15   Hemoglobin      12.0 - 16.0 g/dL 12.5   Hematocrit      35.0 - 48.0 % age 14-46. She had to be on Enbrel and Humira in her 35s but when she turned 48 her psoriasis resolved on its own. Has not had any psoriatic lesions for the past 12 years.   Psoriatic arthritis is also on the differential  - Plan to obtain ESR, CRP, RF, C

## 2021-03-18 RX ORDER — FLUOXETINE HYDROCHLORIDE 20 MG/1
40 CAPSULE ORAL EVERY MORNING
Qty: 180 CAPSULE | Refills: 0 | Status: SHIPPED | OUTPATIENT
Start: 2021-03-18 | End: 2021-06-05

## 2021-03-20 DIAGNOSIS — Z23 NEED FOR VACCINATION: ICD-10-CM

## 2021-04-03 DIAGNOSIS — R11.0 CHRONIC NAUSEA: ICD-10-CM

## 2021-04-05 NOTE — TELEPHONE ENCOUNTER
Requested Prescriptions     Pending Prescriptions Disp Refills   • PROCHLORPERAZINE MALEATE 5 MG Oral [Pharmacy Med Name: Dapdarrius David 5MG TAB TABLET] 30 tablet 3     Sig:  TAKE ONE TABLET BY MOUTH AT BEDTIME AS NEEDED FOR NAUSEA   Lr: 12/7/2020         Thien Moses

## 2021-04-06 RX ORDER — PROCHLORPERAZINE MALEATE 5 MG/1
TABLET ORAL
Qty: 30 TABLET | Refills: 3 | Status: SHIPPED | OUTPATIENT
Start: 2021-04-06 | End: 2021-05-11 | Stop reason: ALTCHOICE

## 2021-04-15 RX ORDER — EZETIMIBE 10 MG/1
TABLET ORAL
Qty: 90 TABLET | Refills: 0 | Status: SHIPPED | OUTPATIENT
Start: 2021-04-15 | End: 2021-07-13

## 2021-04-16 ENCOUNTER — OFFICE VISIT (OUTPATIENT)
Dept: GASTROENTEROLOGY | Facility: CLINIC | Age: 61
End: 2021-04-16
Payer: MEDICAID

## 2021-04-16 VITALS
BODY MASS INDEX: 23.89 KG/M2 | HEIGHT: 65 IN | HEART RATE: 65 BPM | SYSTOLIC BLOOD PRESSURE: 112 MMHG | WEIGHT: 143.38 LBS | DIASTOLIC BLOOD PRESSURE: 65 MMHG

## 2021-04-16 DIAGNOSIS — R63.5 WEIGHT GAIN: ICD-10-CM

## 2021-04-16 DIAGNOSIS — R14.0 ABDOMINAL DISTENSION: ICD-10-CM

## 2021-04-16 DIAGNOSIS — R14.0 ABDOMINAL BLOATING: ICD-10-CM

## 2021-04-16 DIAGNOSIS — R11.0 NAUSEA: Primary | ICD-10-CM

## 2021-04-16 DIAGNOSIS — R10.9 ABDOMINAL PAIN OF MULTIPLE SITES: ICD-10-CM

## 2021-04-16 PROCEDURE — 99214 OFFICE O/P EST MOD 30 MIN: CPT | Performed by: INTERNAL MEDICINE

## 2021-04-16 PROCEDURE — 3078F DIAST BP <80 MM HG: CPT | Performed by: INTERNAL MEDICINE

## 2021-04-16 PROCEDURE — 3008F BODY MASS INDEX DOCD: CPT | Performed by: INTERNAL MEDICINE

## 2021-04-16 PROCEDURE — 3074F SYST BP LT 130 MM HG: CPT | Performed by: INTERNAL MEDICINE

## 2021-04-16 RX ORDER — ONDANSETRON 4 MG/1
TABLET, ORALLY DISINTEGRATING ORAL 2 TIMES DAILY PRN
Qty: 60 TABLET | Refills: 11 | Status: SHIPPED | OUTPATIENT
Start: 2021-04-16

## 2021-04-16 RX ORDER — PREDNISONE 10 MG/1
TABLET ORAL
Qty: 10 TABLET | Refills: 0 | Status: SHIPPED | OUTPATIENT
Start: 2021-04-16 | End: 2021-05-11 | Stop reason: ALTCHOICE

## 2021-04-16 RX ORDER — DIPHENHYDRAMINE HCL 50 MG
CAPSULE ORAL
Qty: 2 CAPSULE | Refills: 0 | Status: SHIPPED | OUTPATIENT
Start: 2021-04-16
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
## 2021-05-11 ENCOUNTER — OFFICE VISIT (OUTPATIENT)
Dept: OTOLARYNGOLOGY | Facility: CLINIC | Age: 61
End: 2021-05-11
Payer: MEDICAID

## 2021-05-11 ENCOUNTER — LAB ENCOUNTER (OUTPATIENT)
Dept: LAB | Facility: HOSPITAL | Age: 61
End: 2021-05-11
Attending: INTERNAL MEDICINE
Payer: MEDICAID

## 2021-05-11 VITALS
SYSTOLIC BLOOD PRESSURE: 98 MMHG | WEIGHT: 143 LBS | BODY MASS INDEX: 22.98 KG/M2 | DIASTOLIC BLOOD PRESSURE: 56 MMHG | HEIGHT: 66 IN

## 2021-05-11 DIAGNOSIS — M79.644 PAIN OF RIGHT THUMB: ICD-10-CM

## 2021-05-11 DIAGNOSIS — H61.23 BILATERAL IMPACTED CERUMEN: Primary | ICD-10-CM

## 2021-05-11 DIAGNOSIS — J30.9 ALLERGIC RHINITIS, UNSPECIFIED SEASONALITY, UNSPECIFIED TRIGGER: ICD-10-CM

## 2021-05-11 DIAGNOSIS — Z87.2 HX OF PSORIASIS: ICD-10-CM

## 2021-05-11 DIAGNOSIS — J34.2 DEVIATED NASAL SEPTUM: ICD-10-CM

## 2021-05-11 PROCEDURE — 85652 RBC SED RATE AUTOMATED: CPT | Performed by: INTERNAL MEDICINE

## 2021-05-11 PROCEDURE — 3008F BODY MASS INDEX DOCD: CPT | Performed by: OTOLARYNGOLOGY

## 2021-05-11 PROCEDURE — 69210 REMOVE IMPACTED EAR WAX UNI: CPT | Performed by: OTOLARYNGOLOGY

## 2021-05-11 PROCEDURE — 3074F SYST BP LT 130 MM HG: CPT | Performed by: OTOLARYNGOLOGY

## 2021-05-11 PROCEDURE — 36415 COLL VENOUS BLD VENIPUNCTURE: CPT | Performed by: INTERNAL MEDICINE

## 2021-05-11 PROCEDURE — 86200 CCP ANTIBODY: CPT | Performed by: INTERNAL MEDICINE

## 2021-05-11 PROCEDURE — 86140 C-REACTIVE PROTEIN: CPT | Performed by: INTERNAL MEDICINE

## 2021-05-11 PROCEDURE — 3078F DIAST BP <80 MM HG: CPT | Performed by: OTOLARYNGOLOGY

## 2021-05-11 PROCEDURE — 86431 RHEUMATOID FACTOR QUANT: CPT | Performed by: INTERNAL MEDICINE

## 2021-05-11 PROCEDURE — 99243 OFF/OP CNSLTJ NEW/EST LOW 30: CPT | Performed by: OTOLARYNGOLOGY

## 2021-05-11 PROCEDURE — 86812 HLA TYPING A B OR C: CPT

## 2021-05-11 RX ORDER — AZELASTINE 1 MG/ML
2 SPRAY, METERED NASAL 2 TIMES DAILY
Qty: 1 BOTTLE | Refills: 3 | Status: SHIPPED | OUTPATIENT
Start: 2021-05-11 | End: 2021-10-05

## 2021-05-11 NOTE — PROGRESS NOTES
Miko Reyes is a 56year old female. Patient presents with:  Consult: right ear feels blocked and was uncomfortable for several months ,now has some pain ,getting worse over time ,sinus congestion ,cant blow ,has some mild dizziness at times.  patient ha kidney    • COPD (chronic obstructive pulmonary disease) (HCC)    • Diabetes (HCC)    • Disorder of thyroid    • High cholesterol    • Kidney stones    • PONV (postoperative nausea and vomiting)    • Rotator cuff impingement syndrome of right shoulder 9/12 Ears Normal Inspection - Right: Normal, Left: Normal. Canal - Right: Normal, Left: Normal. TM - Right: Normal, Left: Normal.  Cerumen impaction bilaterally   Skin Normal Inspection - Normal.        Lymph Detail Normal Submental. Submandibular.  Anterior c MG Oral Tab, TAKE ONE TABLET BY MOUTH EACH EVENING, Disp: 90 tablet, Rfl: 0  •  Azelastine HCl 0.05 % Ophthalmic Solution, Place 1 drop into both eyes 2 (two) times daily. , Disp: 3 Bottle, Rfl: 1  •  clonazePAM 0.5 MG Oral Tab, , Disp: , Rfl:   •  hydrocor cleaning    2. Allergic rhinitis, unspecified seasonality, unspecified trigger    3. Deviated nasal septum  Congestive issues with a deviated septum to the right. Continue Singulair Xyzal and start Astelin nasal spray.   Return to see me in 1 month or if s

## 2021-05-15 DIAGNOSIS — E11.29 TYPE 2 DIABETES MELLITUS WITH MICROALBUMINURIA, WITHOUT LONG-TERM CURRENT USE OF INSULIN (HCC): ICD-10-CM

## 2021-05-15 DIAGNOSIS — R80.9 TYPE 2 DIABETES MELLITUS WITH MICROALBUMINURIA, WITHOUT LONG-TERM CURRENT USE OF INSULIN (HCC): ICD-10-CM

## 2021-05-15 RX ORDER — GLIMEPIRIDE 4 MG/1
4 TABLET ORAL 2 TIMES DAILY
Qty: 180 TABLET | Refills: 1 | Status: SHIPPED | OUTPATIENT
Start: 2021-05-15 | End: 2021-10-22

## 2021-05-17 ENCOUNTER — TELEPHONE (OUTPATIENT)
Dept: RHEUMATOLOGY | Facility: CLINIC | Age: 61
End: 2021-05-17

## 2021-05-17 NOTE — TELEPHONE ENCOUNTER
Discussed results with patient. Blood work was negative for inflammation or even rheumatoid arthritis. It seems like her symptoms are from trigger finger.   She will also make an appointment for an injection

## 2021-05-18 ENCOUNTER — TELEPHONE (OUTPATIENT)
Dept: GASTROENTEROLOGY | Facility: CLINIC | Age: 61
End: 2021-05-18

## 2021-05-18 NOTE — TELEPHONE ENCOUNTER
1st reminder letter mailed to patient regarding her overdue Imaging order:   CT ABDOMEN+PELVIS(CONTRAST ONLY)(CPT=74177)

## 2021-05-25 ENCOUNTER — OFFICE VISIT (OUTPATIENT)
Dept: OPTOMETRY | Facility: CLINIC | Age: 61
End: 2021-05-25
Payer: MEDICAID

## 2021-05-25 DIAGNOSIS — E11.9 TYPE 2 DIABETES MELLITUS WITHOUT COMPLICATION, WITHOUT LONG-TERM CURRENT USE OF INSULIN (HCC): Primary | ICD-10-CM

## 2021-05-25 DIAGNOSIS — H25.13 AGE-RELATED NUCLEAR CATARACT OF BOTH EYES: ICD-10-CM

## 2021-05-25 DIAGNOSIS — H40.003 GLAUCOMA SUSPECT, BILATERAL: ICD-10-CM

## 2021-05-25 DIAGNOSIS — H53.10 SUBJECTIVE VISUAL DISTURBANCE: ICD-10-CM

## 2021-05-25 PROCEDURE — 92014 COMPRE OPH EXAM EST PT 1/>: CPT | Performed by: OPTOMETRIST

## 2021-05-25 PROCEDURE — 92250 FUNDUS PHOTOGRAPHY W/I&R: CPT | Performed by: OPTOMETRIST

## 2021-06-05 RX ORDER — FLUOXETINE HYDROCHLORIDE 20 MG/1
40 CAPSULE ORAL EVERY MORNING
Qty: 180 CAPSULE | Refills: 0 | Status: SHIPPED | OUTPATIENT
Start: 2021-06-05 | End: 2021-09-04

## 2021-06-21 ENCOUNTER — OFFICE VISIT (OUTPATIENT)
Dept: INTERNAL MEDICINE CLINIC | Facility: CLINIC | Age: 61
End: 2021-06-21
Payer: MEDICAID

## 2021-06-21 VITALS
TEMPERATURE: 98 F | OXYGEN SATURATION: 98 % | WEIGHT: 143 LBS | HEART RATE: 73 BPM | DIASTOLIC BLOOD PRESSURE: 60 MMHG | SYSTOLIC BLOOD PRESSURE: 98 MMHG | BODY MASS INDEX: 23 KG/M2

## 2021-06-21 DIAGNOSIS — E11.65 UNCONTROLLED TYPE 2 DIABETES MELLITUS WITH HYPERGLYCEMIA, WITHOUT LONG-TERM CURRENT USE OF INSULIN (HCC): ICD-10-CM

## 2021-06-21 DIAGNOSIS — F17.210 CIGARETTE SMOKER: ICD-10-CM

## 2021-06-21 DIAGNOSIS — E11.9 TYPE 2 DIABETES MELLITUS WITHOUT COMPLICATION, WITHOUT LONG-TERM CURRENT USE OF INSULIN (HCC): Primary | ICD-10-CM

## 2021-06-21 PROCEDURE — 83036 HEMOGLOBIN GLYCOSYLATED A1C: CPT | Performed by: NURSE PRACTITIONER

## 2021-06-21 PROCEDURE — 3074F SYST BP LT 130 MM HG: CPT | Performed by: NURSE PRACTITIONER

## 2021-06-21 PROCEDURE — 3078F DIAST BP <80 MM HG: CPT | Performed by: NURSE PRACTITIONER

## 2021-06-21 PROCEDURE — 36416 COLLJ CAPILLARY BLOOD SPEC: CPT | Performed by: NURSE PRACTITIONER

## 2021-06-21 PROCEDURE — 99214 OFFICE O/P EST MOD 30 MIN: CPT | Performed by: NURSE PRACTITIONER

## 2021-06-21 PROCEDURE — 3051F HG A1C>EQUAL 7.0%<8.0%: CPT | Performed by: INTERNAL MEDICINE

## 2021-06-21 NOTE — ASSESSMENT & PLAN NOTE
56year old female presented for diabetes follow-up. Last A1C was 8.0 on 2/23/21. Today A1C was 7.8. Patient states she has not been managing her sugar intake or exercise as she has been getting ready to move.  She is taking glimepiride twice daily as order

## 2021-06-21 NOTE — PROGRESS NOTES
HPI:    Patient ID: Sandra Gonzalez is a 56year old female. HPI Diabetes Follow up   Patient presented for diabetes follow-up. She states that she has not been watching her sugar intake or exercising as she knows she should.  She states she is moving, whi ENDOSCOPY   • Egd  2018   • Lithotripsy     • Tonsillectomy        Social History    Socioeconomic History      Marital status:       Spouse name: Not on file      Number of children: Not on file      Years of education: Not on file      [facility] ed disturbance. The patient is not nervous/anxious. Current Outpatient Medications   Medication Sig Dispense Refill   • FLUOXETINE HCL 20 MG Oral Cap TAKE 2 CAPSULES (40 MG TOTAL) BY MOUTH EVERY MORNING.   180 capsule 0   • glimepiride 4 MG Oral (cough).  1 Inhaler 0   • Blood Glucose Monitoring Suppl Supplies Does not apply Misc Please supply patient with glucose meter, test strips and lancets covered by her insurance, test TID 1 kit 0   • Alcohol Swabs (BD SWAB SINGLE USE REGULAR) Does not apply adult)   Pulse 73   Temp 98.2 °F (36.8 °C) (Oral)   Wt 143 lb (64.9 kg)   SpO2 98%   BMI 23.08 kg/m²   Wt Readings from Last 2 Encounters:  06/21/21 : 143 lb (64.9 kg)  05/11/21 : 143 lb (64.9 kg)    Body mass index is 23.08 kg/m². (2)           ASSESSMENT/

## 2021-06-21 NOTE — PROGRESS NOTES
HPI:    Patient ID: Tosha Hutchins is a 56year old female. HPI  Patient ID: Tosha Hutchins is a 56year old female. HPI Diabetes Follow up   Patient presented for diabetes follow-up.  She states that she has not been watching her sugar intake or exerci Surgeon: Shannan Antoine MD;  Location: [facility] ENDOSCOPY   • Egd  2018   • Lithotripsy     • Tonsillectomy        Social History    Socioeconomic History      Marital status:       Spouse name: Not on file      Number of children: Not on polo Psychiatric/Behavioral: Negative for agitation and sleep disturbance. The patient is not nervous/anxious.                Current Outpatient Medications   Medication Sig Dispense Refill   • FLUOXETINE HCL 20 MG Oral Cap TAKE 2 CAPSULES (40 MG TOTAL) BY ALBERT into the lungs every 6 (six) hours as needed for Wheezing (cough).  1 Inhaler 0   • Blood Glucose Monitoring Suppl Supplies Does not apply Misc Please supply patient with glucose meter, test strips and lancets covered by her insurance, test TID 1 kit 0   • Location: Right arm, Patient Position: Sitting, Cuff Size: adult)   Pulse 73   Temp 98.2 °F (36.8 °C) (Oral)   Wt 143 lb (64.9 kg)   SpO2 98%   BMI 23.08 kg/m²   Wt Readings from Last 2 Encounters:  06/21/21 : 143 lb (64.9 kg)  05/11/21 : 143 lb (64.9 kg) move. She is taking glimepiride twice daily as ordered but would prefer to take something else, as she believes it is causing a weight gain. PLAN:   1. Limit sugar intake. Increase physical activity to 150 minutes of moderate activity per day.    2. Delfina Puente • Azelastine HCl 0.1 % Nasal Solution 2 sprays by Nasal route 2 (two) times daily. 1 Bottle 3   • ondansetron 4 MG Oral Tablet Dispersible Take 1-2 tablets (4-8 mg total) by mouth 2 (two) times daily as needed for Nausea.  60 tablet 11   • diphenhydrAMINE not apply Kit As needed   0   • TRUEPLUS LANCETS 33G Does not apply Misc      • OxyCODONE HCl IR 30 MG Oral Tab Take 30 mg by mouth as needed. 0   • Fluticasone Propionate 50 MCG/ACT Nasal Suspension 2 sprays by Nasal route daily.  (Patient not taking: R Encounter      Hemoglobin A1C      POC Glycohemoglobin [49767]      Tobacco Use Counseling 3-10 Min [27113]      Meds This Visit:  Requested Prescriptions      No prescriptions requested or ordered in this encounter       Imaging & Referrals:  None

## 2021-06-21 NOTE — PROGRESS NOTES
HPI:  Patient ID: Juany Gomez is a 56year old female. HPI Diabetes Follow up   Patient presented for diabetes follow-up. She states that she has not been watching her sugar intake or exercising as she knows she should.  She states she is moving, which h • Egd  2018   • Lithotripsy     • Tonsillectomy        Social History    Socioeconomic History      Marital status:       Spouse name: Not on file      Number of children: Not on file      Years of education: Not on file      Highest education le Tab TAKE ONE TABLET BY MOUTH EACH EVENING 90 tablet 0   • Azelastine HCl 0.05 % Ophthalmic Solution Place 1 drop into both eyes 2 (two) times daily.  3 Bottle 1   • Tiotropium Bromide Monohydrate (SPIRIVA RESPIMAT) 1.25 MCG/ACT Inhalation Aero Soln Inhale 2 mass index is 23.08 kg/m². (2)           ASSESSMENT/PLAN:     Problem List Items Addressed This Visit        Unprioritized    Type 2 diabetes mellitus without complication, without long-term current use of insulin ([facility] [address] - Primary    Relevant Orders    HEMOG

## 2021-06-23 RX ORDER — BLOOD-GLUCOSE METER
1 EACH MISCELLANEOUS AS DIRECTED
Qty: 1 KIT | Refills: 0 | Status: SHIPPED | OUTPATIENT
Start: 2021-06-23

## 2021-06-23 RX ORDER — LANCETS 33 GAUGE
1 EACH MISCELLANEOUS 3 TIMES DAILY
Qty: 100 EACH | Refills: 5 | Status: SHIPPED | OUTPATIENT
Start: 2021-06-23 | End: 2021-12-16

## 2021-06-28 RX ORDER — AZELASTINE HYDROCHLORIDE 0.5 MG/ML
1 SOLUTION/ DROPS OPHTHALMIC 2 TIMES DAILY
Qty: 3 EACH | Refills: 0 | Status: SHIPPED | OUTPATIENT
Start: 2021-06-28 | End: 2021-09-20

## 2021-06-28 NOTE — TELEPHONE ENCOUNTER
Patient last seen in Allergy 11/12/2020 for . . . Perennial allergic rhinitis with seasonal variation  (primary encounter diagnosis)  History of copd  Cough  Need for pneumococcal vaccine  Former smoker    Refill request received for .  . .    Azelastine

## 2021-07-13 RX ORDER — EZETIMIBE 10 MG/1
TABLET ORAL
Qty: 90 TABLET | Refills: 0 | Status: SHIPPED | OUTPATIENT
Start: 2021-07-13 | End: 2021-10-05

## 2021-07-20 NOTE — TELEPHONE ENCOUNTER
2nd reminder letter mailed to patient regarding her overdue Imaging order:     CT ABDOMEN+PELVIS(CONTRAST ONLY)(CPT=74177)

## 2021-07-23 RX ORDER — BLOOD SUGAR DIAGNOSTIC
STRIP MISCELLANEOUS
Qty: 300 STRIP | Refills: 11 | Status: SHIPPED | OUTPATIENT
Start: 2021-07-23

## 2021-07-28 RX ORDER — FLUTICASONE PROPIONATE 50 MCG
2 SPRAY, SUSPENSION (ML) NASAL DAILY
Qty: 3 EACH | Refills: 0 | Status: SHIPPED | OUTPATIENT
Start: 2021-07-28

## 2021-07-28 NOTE — TELEPHONE ENCOUNTER
Name from pharmacy: FLUTICASONE PROPIONATE 50MCG SUSPENSION          Will file in chart as: FLUTICASONE PROPIONATE 50 MCG/ACT Nasal Suspension    Sig: USE 2 SPRAYS BY NASAL ROUTE ONCE DAILY     Disp:  48 mL    Refills:  1    Start: 7/28/2021    Class: Norm

## 2021-07-28 NOTE — TELEPHONE ENCOUNTER
Notified pt of RX refill and need for follow up by October. Pt verbalizes understanding and reports she will schedule at a different time.

## 2021-07-30 NOTE — TELEPHONE ENCOUNTER
"[facility]    Hospitalist Progress Note      Assessment & Plan   Manas Hernandez is a 37 year old female with h/o untreated HIV, hepatitis C who was admitted on 7/1/2021 for MSSA mitral valve endocarditis resulting in septic shock complicated by embolic left parietal lobe infarctions and multiple peripheral emboli.    She presented initially with encephalopathy, and shock.  She was intubated in ED on 7/1/2021 subsequently extubated 7/11/2021.  Blood cultures turned positive on hospital day 1 with MSSA.  Urine culture was positive for Klebsiella.  She was treated initially with ceftriaxone and vancomycin, and has since been narrowed to nafcillin per ID.     MSSA mitral valve endocarditis - suspect due to IV drug use  Septic shock, resolved  Probable myopericarditis  Small pericardial effusion  *JAE 7/5 noted with small ASD, mod to large mobile vegetation (14mm long, 6mm wide) of the P2 segment of the mitral leaflet. No valve perforation; mod MR  *blood cultures on admission with MSSA; blood cultures have been negative since 7/6.  Afebrile since 7/13.  Leukocytosis resolved  *ID following; antibiotic has been narrowed down to Nafcillin, 6 weeks of antibiotic, completion date 8/18/2021.  *limited ECHO repeated 7/15 to reassess pericardial fluid 7/15 per cardiology and noted:  organized effusion posteriorly, measuring 1.2-1.5cm, no evidence of tamponade; small mobile vegetation noted attached to the mitral leaflet, mod to severe MR; compared to 7/8/21, effusion size is probably stable, Degree of MR is worse, Vegetation is smaller.  Cardiology signed off 7/15.   *re-evaluated by CT surgery 7/15 and suggested continued antibiotic therapy at this time; per CT surgery \"Were she to become a surgical candidate, we would recommend waiting at least 4 weeks from her stroke and she would need repeat MRI imaging to rule out mycotic aneurysms or brain abscesses\"; will have follow up with CT surgery as " Pt sched for 5/19 outpatient  *Abx briefly interrupted as she had been pulling out her access sites; midline placed on 7/13/21-- pulled out again on 7/16-- PICC was re-ordered (after she pulled out 2 previous ones), however vascular access team has suggested holding off on re-insertion until right before patient discharges to facility due to difficult access.  Patient is not delirious anymore and per , likely will have to complete her antibiotic course here in hospital as no accepting TCU so far and unlikely to find one.  PICC ordered on 7/26 as she agrees not to pull it out and having a difficult time with access.   -for now continue IV nafcillin in hospital with plans to complete entire course here through 8/18  -will need follow up with cardiothoracic surgery as an outpatient  -appreciate ongoing ID support    IV drug use  Polysubstance use disorder  *Chem dep consulted 7/15 but chemical dependency did not feel she is appropriate for CD assessment at this time and recommended consultation towards the end of her antibiotic course.  Reconsult CD once she is close to finishing her IV antibiotic.  *Was using IV dilaudid as well as Percocet p.o. earlier this stay pretty extensively.  Attempts made to wean off IV narcotics and taper down po narcotics though patient felt she was going through withdrawal. Psych consulted and recommended suboxone though patient declined. Psych recommended rapid taper of methadone instead.   *completed methadone taper of 30 mg on 7/21, 20 mg 7/22 and then 10 mg 7/23, then stop  -no further narcotics   -starting suboxone on 7/27 - appreciate psych/addiction medicine assistance who will help with titration  -psych also mentioned outpatient methadone maintenances as a possibility  -Reconsult CD once she is close to finishing her IV antibiotic.     HIV, untreated PTA  - CD4 count greater than 500, viral load 3695  - Started Biktarvy 7/8; ID following    Chronic back pain: pt notes it present ever  since she was pregnant.   -avoiding narcotics per psych  -tylenol  -added lidoderm patch 7/23  -toradol prn  -increase gabapentin to 600 mg TID     Toxic/septic/metabolic encephalopathy, acute, improving  Likely encephalopathy due to sepsis, drug use, CVA, prolonged ICU stay; had been pulling lines due to alterned mentation thought this all now appears resolved.   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  Completed course of treatment with ceftriaxone.  repeat urine cultures 7/10 with ESBL E coli but given unimpressive UA and no UTI symptoms ID suggest holding off on abx for ESBL UTI  -monitor for any symptoms     Acute hypoxemic respiratory failure, resolved  Intubated 7/1/2021.  Extubated 7/11/2021.  Likely related to septic shock from MSSA mitral valve endocarditis     FORD, resolved suspected due to sepsis  Hypokalemia  - on potassium replacement protocol  - will check BMP periodically     Chronic anemia  Baseline hemoglobin approximately 7.  Likely related to chronic disease, untreated HIV.  -Monitor hemoglobin periodically; stable around 9     Non severe malnutrition  Nutrition following. Appreciate assistance  -Encourage p.o., with supplements     Prior hepatitis C infection  Antibody positive but RNA negative  -Noted    Restless legs: unclear if this is true restless leg syndrome or due to being uncomfortable  -continue mirapex prn    Suspected major depression: pt is not suicidal but  also doesn't seem to have much of a will to live based upon discussion 7/25.  I wonder if her underlying chronic pain which is difficult to treat as well as being in the hospital is contributing. Was seen by psych and declined any medication treatment on 7/26.   -monitor and consider starting treatment if she changes her mind    DVT Prophylaxis: heparin  Code Status: Full Code    Disposition: Expected discharge once IV abx are complete after 8/18 as having a difficult time finding placement in TCU.   -patient has threatened to leave AMA a couple times, most recently on 7/25 but able to be talked into staying.  At this point I do think that due to likely depression, lack of insight from her drug addiction, possible cognitive impairment due to stroke, lack of plan for outpatient IV abx and outpatient treatment of her addiction she is at high risk of harm to self and if she tries to leave AMA would place a 72 hour hold. Please see discussion in hospitalist note from 7/25 and psych note 7/26.     Time Spent on this Encounter   I spent >35 minutes on the unit/floor managing the care of this patient. Over 50% of my time was spent on the following:   - Counseling the patient and/or family regarding: diagnostic results, prognosis, risks and benefits of treatment options, recommended follow-up and medical compliance  - Coordination of care with the: care coordinator/, nurse and patient and pharmacist      Deepti Rivera MD  2:34 PM      Interval History   Patient was tearful today. She is frustrated with prolonged hospitalization. She feels people here don't care about her. Last night she wet her pants due to having to wait for someone to help her to the restroom. Eventually she just went to the restroom without assistance but was chastised for it. I tried to reassure her that we do care about her here and asked what we could do to help the stay seem more comfortable.  - I did discontinue SQH and talked to  patient about ambulating in the room to prevent DVT. She was happy about that.  - I increased gabapentin to 600mg TID per pt request. This was PTA dosing.  - Pt notes that some RNs run abx over 30 min rather than one hour. She prefers it run more quickly. I spoke with PharmD who confirmed that nafcillin can be run over 30 min and changed order to reflect that so it is not nurse dependent.  - Pt would like to be off bed alarm so she can ambulate to the restroom and not wait for assistance. I discussed with RN. Apparently pt had a fall earlier in admission. However, we are not sure how long ago that was and pt has improved quite a bit since admission. RN is going to eval today (pt is new to her) and try to ask therapists. If we can discontinue fall precautions safely that would be helpful.  - Pt would like to go outside to smoke. She declines NRT. I spent some time explaining the reasoning behind not allowing pt's to smoke outside given the pandemic and possible exposures, especially in light of new COVID cases in the community. She did understand that explanation.  - Pt endorses severe depression and suicidal ideation but was adamant about not wanting to talk to a psychiatrist. I told her that we could continue talking and I could start an antidepressant and we could see if that worked. She will think about this.     -Data reviewed today: I reviewed all new labs and imaging results over the last 24 hours. I personally reviewed no images or EKG's today.    Physical Exam   Temp: 98.3  F (36.8  C) Temp src: Oral BP: 122/84 Pulse: 91   Resp: 16 SpO2: 100 % O2 Device: None (Room air)    Vitals:    07/13/21 0200 07/14/21 0445 07/16/21 0615   Weight: 89 kg (196 lb 3.4 oz) 84.2 kg (185 lb 10 oz) 76.8 kg (169 lb 5 oz)     Vital Signs with Ranges  Temp:  [98  F (36.7  C)-98.5  F (36.9  C)] 98.3  F (36.8  C)  Pulse:  [] 91  Resp:  [16-20] 16  BP: (115-122)/(79-84) 122/84  SpO2:  [98 %-100 %] 100 %  I/O last 3 completed  shifts:  In: 1560 [P.O.:1260; I.V.:300]  Out: -     Constitutional: sitting in bed, tearful at times    Respiratory: Clear to auscultation bilaterally, no crackles or wheezing noted.  Good air exchange.     Cardiovascular: Regular rate and rhythm.  No murmur, rub or gallop noted.     GI: Positive bowel sounds, soft, non-distended, non-tender.  No masses or organomegaly noted.     Musculoskeletal: moving all extremities     Neurologic: Awake, alert and oriented to name, place and time.  Cranial nerves II-XII are grossly intact.      Lymphatic: No edema noted    Skin: no new rash    Psych: somewhat depressed and distant at times    Medications       bictegravir-emtricitabine-tenofovir  1 tablet Oral Daily     buprenorphine HCl-naloxone HCl  1 Film Sublingual Daily     gabapentin  600 mg Oral TID     lidocaine  2-3 patch Transdermal Q24H     lidocaine   Transdermal Q8H     multivitamin w/minerals  1 tablet Oral Daily     nafcillin  2 g Intravenous Q4H     pantoprazole  40 mg Oral QAM AC     sodium chloride (PF)  10 mL Intracatheter Q8H       Data   Recent Labs   Lab 07/28/21  0611 07/28/21  0204 07/27/21  0144 07/26/21  1020 07/25/21  1017     --   --   --  447   NA  --   --   --  140  --    POTASSIUM  --   --   --  4.6  --    CHLORIDE  --   --   --  114*  --    CO2  --   --   --  25  --    BUN  --   --   --  16  --    CR  --   --   --  0.49*  --    ANIONGAP  --   --   --  1*  --    DEREK  --   --   --  8.4*  --    GLC  --  111* 155* 73  --        No results found for this or any previous visit (from the past 24 hour(s)).

## 2021-08-13 DIAGNOSIS — E03.9 ACQUIRED HYPOTHYROIDISM: ICD-10-CM

## 2021-08-13 RX ORDER — LEVOTHYROXINE SODIUM 88 UG/1
88 TABLET ORAL
Qty: 90 TABLET | Refills: 1 | Status: SHIPPED | OUTPATIENT
Start: 2021-08-13 | End: 2021-08-28

## 2021-08-13 NOTE — TELEPHONE ENCOUNTER
[facility]     Requested Prescriptions   Pending Prescriptions Disp Refills    LEVOTHYROXINE SODIUM 88 MCG Oral Tab [Pharmacy Med Name: LEVOTHYROXINE SODIUM 88MCG TABLET] 90 tablet 1     Sig: Take 1 tablet (88 mcg total) by mouth before breakfast.        Thyroid Medication Protocol Passed - 8/13/2021 11:13 AM        Passed - TSH in past 12 months        Passed - Last TSH value is normal     Lab Results   Component Value Date    TSH 2.520 02/23/2021                 Passed - Appointment in past 12 or next 3 months            Recent Outpatient Visits              1 month ago Type 2 diabetes mellitus without complication, without long-term current use of insulin [facility])    [facility], [address], Joseph Pierre APRN    Office Visit    2 months ago Type 2 diabetes mellitus without complication, without long-term current use of insulin [facility])    [facility] for [address] Visit    3 months ago Bilateral impacted cerumen    [facility] for Mariah Gr, Miguel Ángel Gamez MD    Office Visit    3 months ago Nausea    [address] Tania Treviño MD    Office Visit    4 months ago Pain of right thumb    [facility] Jillian Hernandez MD    Office Visit

## 2021-08-28 DIAGNOSIS — E03.9 ACQUIRED HYPOTHYROIDISM: ICD-10-CM

## 2021-08-28 RX ORDER — LEVOTHYROXINE SODIUM 88 UG/1
88 TABLET ORAL
Qty: 14 TABLET | Refills: 0 | Status: SHIPPED | OUTPATIENT
Start: 2021-08-28

## 2021-08-28 NOTE — TELEPHONE ENCOUNTER
Patient calling from [place], [place] and states her mother passed away yesterday and in her rush to get to [place] before she passed she left her thyroid medication at home. Patient has not had for 2 days and is requesting medication be sent to pharmacy in [place].

## 2021-08-31 ENCOUNTER — TELEPHONE (OUTPATIENT)
Dept: INTERNAL MEDICINE CLINIC | Facility: CLINIC | Age: 61
End: 2021-08-31

## 2021-09-04 RX ORDER — FLUOXETINE HYDROCHLORIDE 20 MG/1
40 CAPSULE ORAL EVERY MORNING
Qty: 60 CAPSULE | Refills: 0 | Status: SHIPPED | OUTPATIENT
Start: 2021-09-04 | End: 2021-10-05

## 2021-09-14 ENCOUNTER — NURSE TRIAGE (OUTPATIENT)
Dept: INTERNAL MEDICINE CLINIC | Facility: CLINIC | Age: 61
End: 2021-09-14

## 2021-09-14 NOTE — TELEPHONE ENCOUNTER
Action Requested: Summary for Provider     []  Critical Lab, Recommendations Needed  [] Need Additional Advice  []   FYI    []   Need Orders  [] Need Medications Sent to Pharmacy  []  Other     SUMMARY: Per protocol advised : OV within 3 days    Future Raina do activities of daily living)    Protocols used: Maryellen Pascual

## 2021-09-16 ENCOUNTER — OFFICE VISIT (OUTPATIENT)
Dept: INTERNAL MEDICINE CLINIC | Facility: CLINIC | Age: 61
End: 2021-09-16
Payer: MEDICAID

## 2021-09-16 ENCOUNTER — LAB ENCOUNTER (OUTPATIENT)
Dept: LAB | Facility: HOSPITAL | Age: 61
End: 2021-09-16
Attending: INTERNAL MEDICINE
Payer: MEDICAID

## 2021-09-16 VITALS
RESPIRATION RATE: 14 BRPM | SYSTOLIC BLOOD PRESSURE: 98 MMHG | HEART RATE: 82 BPM | BODY MASS INDEX: 22.82 KG/M2 | DIASTOLIC BLOOD PRESSURE: 60 MMHG | OXYGEN SATURATION: 96 % | HEIGHT: 66 IN | WEIGHT: 142 LBS

## 2021-09-16 DIAGNOSIS — Z72.0 TOBACCO ABUSE: ICD-10-CM

## 2021-09-16 DIAGNOSIS — F32.A MILD DEPRESSION: Primary | ICD-10-CM

## 2021-09-16 DIAGNOSIS — M54.50 LOW BACK PAIN WITHOUT SCIATICA, UNSPECIFIED BACK PAIN LATERALITY, UNSPECIFIED CHRONICITY: ICD-10-CM

## 2021-09-16 LAB
BILIRUB UR QL: NEGATIVE
CLARITY UR: CLEAR
COLOR UR: YELLOW
GLUCOSE UR-MCNC: NEGATIVE MG/DL
KETONES UR-MCNC: NEGATIVE MG/DL
NITRITE UR QL STRIP.AUTO: NEGATIVE
PH UR: 6 [PH] (ref 5–8)
PROT UR-MCNC: NEGATIVE MG/DL
SP GR UR STRIP: 1.01 (ref 1–1.03)
UROBILINOGEN UR STRIP-ACNC: <2

## 2021-09-16 PROCEDURE — 3078F DIAST BP <80 MM HG: CPT | Performed by: INTERNAL MEDICINE

## 2021-09-16 PROCEDURE — 81001 URINALYSIS AUTO W/SCOPE: CPT | Performed by: INTERNAL MEDICINE

## 2021-09-16 PROCEDURE — 87086 URINE CULTURE/COLONY COUNT: CPT | Performed by: INTERNAL MEDICINE

## 2021-09-16 PROCEDURE — 3008F BODY MASS INDEX DOCD: CPT | Performed by: INTERNAL MEDICINE

## 2021-09-16 PROCEDURE — 99214 OFFICE O/P EST MOD 30 MIN: CPT | Performed by: INTERNAL MEDICINE

## 2021-09-16 PROCEDURE — 3074F SYST BP LT 130 MM HG: CPT | Performed by: INTERNAL MEDICINE

## 2021-09-16 RX ORDER — SULFAMETHOXAZOLE AND TRIMETHOPRIM 800; 160 MG/1; MG/1
1 TABLET ORAL 2 TIMES DAILY
Qty: 10 TABLET | Refills: 0 | Status: SHIPPED | OUTPATIENT
Start: 2021-09-16 | End: 2021-09-21

## 2021-09-16 NOTE — PROGRESS NOTES
Ellie Uribe is a 59year old female. Patient presents with:  Depression  Back Pain    HPI:   78-year-old lady here for evaluation of depression and back pain. She reports that since she lost her mom, she continues to have grief/mild depression.   She is c (SPIRIVA RESPIMAT) 1.25 MCG/ACT Inhalation Aero Soln Inhale 2 puffs into the lungs daily. 3 Inhaler 0   • clonazePAM 0.5 MG Oral Tab Take 0.5 mg by mouth nightly as needed.      • hydrocortisone 2.5 % External Cream Apply bid to involved areas as needed 60 pulmonary disease) ([facility] [address]    • Diabetes ([facility] [address]    • Disorder of thyroid    • High cholesterol    • Kidney stones    • PONV (postoperative nausea and vomiting)    • Rotator cuff impingement syndrome of right shoulder 9/12/2017      Past Surgical History:   Pr abdominal pain and vomiting. Genitourinary: Negative for hematuria. Musculoskeletal: Positive for back pain. Skin: Negative for wound.    Psychiatric/Behavioral: Negative for agitation, behavioral problems, confusion, decreased concentration, hallucin worsening depression. I think what she has is a normal grief and that gets worse during the anniversaries and birthdays. I have encouraged her to continue with the fluoxetine. She will help with a counselor.   I have given referral for [facility]

## 2021-09-20 ENCOUNTER — APPOINTMENT (OUTPATIENT)
Dept: CT IMAGING | Facility: HOSPITAL | Age: 61
End: 2021-09-20
Attending: EMERGENCY MEDICINE
Payer: MEDICAID

## 2021-09-20 ENCOUNTER — HOSPITAL ENCOUNTER (EMERGENCY)
Facility: HOSPITAL | Age: 61
Discharge: HOME | End: 2021-09-21
Attending: EMERGENCY MEDICINE
Payer: MEDICAID

## 2021-09-20 DIAGNOSIS — N30.00 ACUTE CYSTITIS WITHOUT HEMATURIA: Primary | ICD-10-CM

## 2021-09-20 DIAGNOSIS — K59.00 CONSTIPATION, UNSPECIFIED CONSTIPATION TYPE: ICD-10-CM

## 2021-09-20 LAB
ANION GAP SERPL CALC-SCNC: 6 MMOL/L (ref 0–18)
BASOPHILS # BLD AUTO: 0.06 X10(3) UL (ref 0–0.2)
BASOPHILS NFR BLD AUTO: 1 %
BILIRUB UR QL: NEGATIVE
BUN BLD-MCNC: 18 MG/DL (ref 7–18)
BUN/CREAT SERPL: 19.8 (ref 10–20)
CALCIUM BLD-MCNC: 9.5 MG/DL (ref 8.5–10.1)
CHLORIDE SERPL-SCNC: 105 MMOL/L (ref 98–112)
CO2 SERPL-SCNC: 27 MMOL/L (ref 21–32)
COLOR UR: YELLOW
CREAT BLD-MCNC: 0.91 MG/DL
DEPRECATED RDW RBC AUTO: 46.8 FL (ref 35.1–46.3)
EOSINOPHIL # BLD AUTO: 0.13 X10(3) UL (ref 0–0.7)
EOSINOPHIL NFR BLD AUTO: 2.1 %
ERYTHROCYTE [DISTWIDTH] IN BLOOD BY AUTOMATED COUNT: 14 % (ref 11–15)
GLUCOSE BLD-MCNC: 193 MG/DL (ref 70–99)
GLUCOSE UR-MCNC: 50 MG/DL
HCT VFR BLD AUTO: 41.4 %
HGB BLD-MCNC: 13.4 G/DL
IMM GRANULOCYTES # BLD AUTO: 0.01 X10(3) UL (ref 0–1)
IMM GRANULOCYTES NFR BLD: 0.2 %
KETONES UR-MCNC: NEGATIVE MG/DL
LYMPHOCYTES # BLD AUTO: 2.89 X10(3) UL (ref 1–4)
LYMPHOCYTES NFR BLD AUTO: 45.9 %
MCH RBC QN AUTO: 29.3 PG (ref 26–34)
MCHC RBC AUTO-ENTMCNC: 32.4 G/DL (ref 31–37)
MCV RBC AUTO: 90.6 FL
MONOCYTES # BLD AUTO: 0.62 X10(3) UL (ref 0.1–1)
MONOCYTES NFR BLD AUTO: 9.9 %
NEUTROPHILS # BLD AUTO: 2.58 X10 (3) UL (ref 1.5–7.7)
NEUTROPHILS # BLD AUTO: 2.58 X10(3) UL (ref 1.5–7.7)
NEUTROPHILS NFR BLD AUTO: 40.9 %
NITRITE UR QL STRIP.AUTO: NEGATIVE
OSMOLALITY SERPL CALC.SUM OF ELEC: 293 MOSM/KG (ref 275–295)
PH UR: 6 [PH] (ref 5–8)
PLATELET # BLD AUTO: 254 10(3)UL (ref 150–450)
POTASSIUM SERPL-SCNC: 4.1 MMOL/L (ref 3.5–5.1)
PROT UR-MCNC: 30 MG/DL
RBC # BLD AUTO: 4.57 X10(6)UL
SODIUM SERPL-SCNC: 138 MMOL/L (ref 136–145)
SP GR UR STRIP: 1.03 (ref 1–1.03)
UROBILINOGEN UR STRIP-ACNC: 2
WBC # BLD AUTO: 6.3 X10(3) UL (ref 4–11)

## 2021-09-20 PROCEDURE — 99285 EMERGENCY DEPT VISIT HI MDM: CPT

## 2021-09-20 PROCEDURE — 96361 HYDRATE IV INFUSION ADD-ON: CPT

## 2021-09-20 PROCEDURE — 85025 COMPLETE CBC W/AUTO DIFF WBC: CPT | Performed by: EMERGENCY MEDICINE

## 2021-09-20 PROCEDURE — 81001 URINALYSIS AUTO W/SCOPE: CPT

## 2021-09-20 PROCEDURE — 74176 CT ABD & PELVIS W/O CONTRAST: CPT | Performed by: EMERGENCY MEDICINE

## 2021-09-20 PROCEDURE — 96365 THER/PROPH/DIAG IV INF INIT: CPT

## 2021-09-20 PROCEDURE — 87086 URINE CULTURE/COLONY COUNT: CPT | Performed by: EMERGENCY MEDICINE

## 2021-09-20 PROCEDURE — 81001 URINALYSIS AUTO W/SCOPE: CPT | Performed by: EMERGENCY MEDICINE

## 2021-09-20 PROCEDURE — 87086 URINE CULTURE/COLONY COUNT: CPT

## 2021-09-20 PROCEDURE — 80048 BASIC METABOLIC PNL TOTAL CA: CPT | Performed by: EMERGENCY MEDICINE

## 2021-09-20 RX ORDER — AZELASTINE HYDROCHLORIDE 0.5 MG/ML
1 SOLUTION/ DROPS OPHTHALMIC 2 TIMES DAILY
Qty: 18 ML | Refills: 1 | Status: SHIPPED | OUTPATIENT
Start: 2021-09-20

## 2021-09-20 NOTE — TELEPHONE ENCOUNTER
Name from pharmacy: AZELASTINE HYDROCHLORIDE 0.05% SOLUTION          Will file in chart as: AZELASTINE HCL 0.05 % Ophthalmic Solution    Sig: Place 1 drop into both eyes 2 (two) times daily.     Disp:  18 mL    Refills:  0    Start: 9/20/2021    Class: Norm

## 2021-09-21 VITALS
DIASTOLIC BLOOD PRESSURE: 77 MMHG | OXYGEN SATURATION: 99 % | HEART RATE: 80 BPM | RESPIRATION RATE: 14 BRPM | SYSTOLIC BLOOD PRESSURE: 127 MMHG | TEMPERATURE: 98 F

## 2021-09-21 RX ORDER — CEPHALEXIN 500 MG/1
500 CAPSULE ORAL 3 TIMES DAILY
Qty: 15 CAPSULE | Refills: 0 | Status: SHIPPED | OUTPATIENT
Start: 2021-09-21 | End: 2021-09-26

## 2021-09-21 NOTE — ED QUICK NOTES
Pt to ED by self with c/o right to mid lumbar back pain that radiates to right abdomen. Pt states pain resembles past kidney stones. Pt denies fall or trauma. Pt denies hematuria or dysuria. Pt c/o intermittent nausea without vomiting.  Pt skin parameters W

## 2021-09-21 NOTE — ED PROVIDER NOTES
Patient Seen in: [facility] Emergency Department      History   Patient presents with:  Back Pain  Urinary Symptoms    Stated Complaint: lower back/flank pain     Subjective:   HPI    The patient is a 10-year-old female with a history of kidney st Triage Vitals [09/20/21 2057]   /78   Pulse 80   Resp 18   Temp 98.3 °F (36.8 °C)   Temp src Temporal   SpO2 97 %   O2 Device None (Room air)       Current:/77   Pulse 80   Temp 98.3 °F (36.8 °C) (Temporal)   Resp 14   SpO2 99%         Physical Urobilinogen Urine 2.0 (*)     Leukocyte Esterase Urine Large (*)     WBC Urine 21-50 (*)     RBC Urine 6-10 (*)     Squamous Epi.  Cells Few (*)     All other components within normal limits   BASIC METABOLIC PANEL (8) - Abnormal; Notable for the following the next available Radiologist.             Michael Correa M.D. Radiology exams  Viewed and reviewed by myself and findings discussed with patient including need for follow up                               Disposition and Plan     Clinical Impression:   Ac

## 2021-09-21 NOTE — ED INITIAL ASSESSMENT (HPI)
Pt to ED for continued mid back pain onset last week and worsening in severity. Pt was seen at PCP on the 16th and dx with UTI, has been taking bactrim since Friday but states she now has nausea and continues to have urinary frequency.

## 2021-09-22 NOTE — TELEPHONE ENCOUNTER
Per patient she is feeling better today, she will wait for her appointment on 10/05/2021 but if she will feel worse she will call back.

## 2021-09-22 NOTE — TELEPHONE ENCOUNTER
Called pt to make medication follow up appt, and pt informed that she was discharged from hospital 9/20 for infection.    Scheduled pt for hospital follow up for 10/5 first available, but pt wanted to reach out to make sure that  didn't want to see her so

## 2021-09-27 ENCOUNTER — NURSE TRIAGE (OUTPATIENT)
Dept: INTERNAL MEDICINE CLINIC | Facility: CLINIC | Age: 61
End: 2021-09-27

## 2021-09-27 NOTE — TELEPHONE ENCOUNTER
The patient did not receive the 2nd dose of the covid vaccine due to health issues. She stated now her best friend had covid and had a stroke.   She stated the     Action Requested: Summary for Provider     []  Critical Lab, Recommendations Needed  [] Reason for Disposition  • [1] COVID-19 infection suspected by caller or triager AND [2] mild symptoms (cough, fever, or others) AND [6] no complications or SOB    Protocols used: CORONAVIRUS (COVID-19) DIAGNOSED OR [id number]

## 2021-10-05 ENCOUNTER — LAB ENCOUNTER (OUTPATIENT)
Dept: LAB | Facility: HOSPITAL | Age: 61
End: 2021-10-05
Attending: INTERNAL MEDICINE
Payer: MEDICAID

## 2021-10-05 ENCOUNTER — OFFICE VISIT (OUTPATIENT)
Dept: INTERNAL MEDICINE CLINIC | Facility: CLINIC | Age: 61
End: 2021-10-05
Payer: MEDICAID

## 2021-10-05 VITALS
TEMPERATURE: 97 F | BODY MASS INDEX: 23.29 KG/M2 | SYSTOLIC BLOOD PRESSURE: 114 MMHG | WEIGHT: 144.88 LBS | DIASTOLIC BLOOD PRESSURE: 68 MMHG | RESPIRATION RATE: 16 BRPM | HEART RATE: 79 BPM | HEIGHT: 66 IN

## 2021-10-05 DIAGNOSIS — Z12.31 BREAST CANCER SCREENING BY MAMMOGRAM: ICD-10-CM

## 2021-10-05 DIAGNOSIS — E11.65 UNCONTROLLED TYPE 2 DIABETES MELLITUS WITH HYPERGLYCEMIA, WITHOUT LONG-TERM CURRENT USE OF INSULIN (HCC): ICD-10-CM

## 2021-10-05 DIAGNOSIS — N39.0 URINARY TRACT INFECTION WITHOUT HEMATURIA, SITE UNSPECIFIED: ICD-10-CM

## 2021-10-05 DIAGNOSIS — E78.00 HYPERCHOLESTEROLEMIA: ICD-10-CM

## 2021-10-05 DIAGNOSIS — F32.A MODERATE DEPRESSIVE EPISODE: Primary | ICD-10-CM

## 2021-10-05 PROBLEM — H53.10 SUBJECTIVE VISUAL DISTURBANCE: Status: RESOLVED | Noted: 2021-05-25 | Resolved: 2021-10-05

## 2021-10-05 PROCEDURE — 36415 COLL VENOUS BLD VENIPUNCTURE: CPT

## 2021-10-05 PROCEDURE — 84460 ALANINE AMINO (ALT) (SGPT): CPT

## 2021-10-05 PROCEDURE — 99215 OFFICE O/P EST HI 40 MIN: CPT | Performed by: INTERNAL MEDICINE

## 2021-10-05 PROCEDURE — 84450 TRANSFERASE (AST) (SGOT): CPT

## 2021-10-05 PROCEDURE — 82043 UR ALBUMIN QUANTITATIVE: CPT

## 2021-10-05 PROCEDURE — 83721 ASSAY OF BLOOD LIPOPROTEIN: CPT

## 2021-10-05 PROCEDURE — 87086 URINE CULTURE/COLONY COUNT: CPT

## 2021-10-05 PROCEDURE — 81001 URINALYSIS AUTO W/SCOPE: CPT

## 2021-10-05 PROCEDURE — 3008F BODY MASS INDEX DOCD: CPT | Performed by: INTERNAL MEDICINE

## 2021-10-05 PROCEDURE — 82570 ASSAY OF URINE CREATININE: CPT

## 2021-10-05 PROCEDURE — 3061F NEG MICROALBUMINURIA REV: CPT | Performed by: INTERNAL MEDICINE

## 2021-10-05 PROCEDURE — 3074F SYST BP LT 130 MM HG: CPT | Performed by: INTERNAL MEDICINE

## 2021-10-05 PROCEDURE — 3078F DIAST BP <80 MM HG: CPT | Performed by: INTERNAL MEDICINE

## 2021-10-05 RX ORDER — FLUOXETINE HYDROCHLORIDE 20 MG/1
40 CAPSULE ORAL EVERY MORNING
Qty: 180 CAPSULE | Refills: 1 | Status: SHIPPED | OUTPATIENT
Start: 2021-10-05 | End: 2022-01-14 | Stop reason: SDUPTHER

## 2021-10-05 RX ORDER — EZETIMIBE 10 MG/1
10 TABLET ORAL NIGHTLY
Qty: 90 TABLET | Refills: 0 | Status: SHIPPED | OUTPATIENT
Start: 2021-10-05 | End: 2022-01-07

## 2021-10-05 NOTE — ASSESSMENT & PLAN NOTE
Patient is A1c was high. She has scheduled an appointment with the endocrinologist for later this month.

## 2021-10-05 NOTE — PROGRESS NOTES
HPI:    Patient ID: Juan Carlos Simmons is a 59year old female. HPI:  She has had a bad few months. She had 2 deaths in the family (mother and brother-in -law) and her best friend just  last weekend. Her daughter and grandson don't speak to her.   She w TOTAL) BY MOUTH TWICE A WEEK.  (Patient taking differently: Take 5 mg by mouth daily.) 90 tablet 1   • LEVOCETIRIZINE DIHYDROCHLORIDE 5 MG Oral Tab TAKE ONE TABLET BY MOUTH EACH EVENING 90 tablet 0   • MONTELUKAST SODIUM 10 MG Oral Tab TAKE ONE TABLET BY MO BREATH    Comment:Had some kind of contrast many years ago with an             x-ray.   Other                   NAUSEA ONLY    Comment:PO antibiotics     Social History    Tobacco Use      Smoking status: Current Some Day Smoker ASSESSMENT/PLAN:     Problem List Items Addressed This Visit        High    Moderate depressive episode ([facility] [address] - Primary     Patient is on fluoxetine 40 mg daily and has significant depression for the past several months.   She has had multiple de

## 2021-10-05 NOTE — ASSESSMENT & PLAN NOTE
Patient is on fluoxetine 40 mg daily and has significant depression for the past several months. She has had multiple deaths in the family, including sudden death due to Covid of a close friend 3 days ago, and the death of her mother recently.   She declin

## 2021-10-05 NOTE — TELEPHONE ENCOUNTER
Refill passed per [facility] protocol. Requested Prescriptions   Pending Prescriptions Disp Refills    FLUOXETINE 20 MG Oral Cap [Pharmacy Med Name: FLUOXETINE HCL 20MG CAPSULE] 60 capsule 0     Sig: Take 2 capsules (40 mg total) by mouth every morning.         Psychiatric Non-Scheduled (Anti-Anxiety) Passed - 10/5/2021 10:31 AM        Passed - Appointment in last 6 or next 3 months              Recent Outpatient Visits              2 weeks ago Mild depression [facility])    [address] [facility], [address], Corie Braga MD    Office Visit    3 months ago Type 2 diabetes mellitus without complication, without long-term current use of insulin [facility])    [address] [facility] [facility], [address], Joseph Pierre APRN    Office Visit    4 months ago Type 2 diabetes mellitus without complication, without long-term current use of insulin [facility])    [facility] for [address] Visit    4 months ago Bilateral impacted cerumen    [facility], [address], Brenna Lemus MD    Office Visit    5 months ago Nausea    [address], Chas[address] Valencia, Radha Root MD    Office Visit            Future Appointments         Provider Department Appt Notes    Today Kyra Betancourt MD [facility], [address] [facility]     In 4 weeks Anders Philip MD [address] [place] [address] [facility] End[facility] Uncontrolled type 2 diabetes mellitus with hyperglycemia, without long-term current use of insulin , policy informed

## 2021-10-20 DIAGNOSIS — R80.9 TYPE 2 DIABETES MELLITUS WITH MICROALBUMINURIA, WITHOUT LONG-TERM CURRENT USE OF INSULIN: ICD-10-CM

## 2021-10-20 DIAGNOSIS — E11.29 TYPE 2 DIABETES MELLITUS WITH MICROALBUMINURIA, WITHOUT LONG-TERM CURRENT USE OF INSULIN: ICD-10-CM

## 2021-10-20 NOTE — TELEPHONE ENCOUNTER
Please review. Protocol failed / No protocol.     Requested Prescriptions   Pending Prescriptions Disp Refills    ERGOCALCIFEROL 1.25 MG (46850 UT) Oral Cap [Pharmacy Med Name: VITAMIN D [id number] CAPSULE] 12 capsule 0     Sig: TAKE ONE CAPSULE BY MOUTH ONCE A WEEK        There is no refill protocol information for this order        GLIMEPIRIDE 4 MG Oral Tab [Pharmacy Med Name: GLIMEPIRIDE 4MG TABLET] 180 tablet 1     Sig: TAKE ONE TABLET BY MOUTH TWICE A DAY        Diabetes Medication Protocol Failed - 10/20/2021  1:01 PM        Failed - Last A1C < 7.5 and within past 6 months     Lab Results   Component Value Date    A1C 7.8 (A) 06/21/2021               Passed - Appointment in past 6 or next 3 months        Passed - GFR Non- > 50     Lab Results   Component Value Date    GFRNAA 67 09/20/2021                 Passed - GFR in the past 12 months              Future Appointments         Provider Department Appt Notes    Tomorrow Margarita Norman MD [facility], [facility] 2 wk f/u ok per LV to use res24    In 1 week Bertha Alonzo MD [facility] Endocrinology Uncontrolled type 2 diabetes mellitus with hyperglycemia, without long-term current use of insulin , policy informed             Recent Outpatient Visits              2 weeks ago Moderate depressive episode [facility])    [facility], [address], John Camarillo MD    Office Visit    1 month ago Mild depression [facility])    [facility], [address], Freddy Crandall MD    Office Visit    4 months ago Type 2 diabetes mellitus without complication, without long-term current use of insulin [facility])    [facility], [address], Joseph Pierre APRN    Office Visit    4 months ago Type 2 diabetes mellitus without complication, without long-term current use of insulin [facility])    [facility] for [address] Visit    5 months ago Bilateral impacted cerumen    [place] [address] [facility], [address] [facility],[address], Suzette Castano MD    Office Visit

## 2021-10-21 ENCOUNTER — OFFICE VISIT (OUTPATIENT)
Dept: INTERNAL MEDICINE CLINIC | Facility: CLINIC | Age: 61
End: 2021-10-21
Payer: MEDICAID

## 2021-10-21 VITALS
WEIGHT: 143.81 LBS | HEART RATE: 80 BPM | TEMPERATURE: 98 F | BODY MASS INDEX: 23.11 KG/M2 | DIASTOLIC BLOOD PRESSURE: 64 MMHG | SYSTOLIC BLOOD PRESSURE: 99 MMHG | HEIGHT: 66 IN

## 2021-10-21 DIAGNOSIS — E55.9 VITAMIN D DEFICIENCY: ICD-10-CM

## 2021-10-21 DIAGNOSIS — R80.9 TYPE 2 DIABETES MELLITUS WITH MICROALBUMINURIA, WITHOUT LONG-TERM CURRENT USE OF INSULIN (HCC): ICD-10-CM

## 2021-10-21 DIAGNOSIS — E78.00 HYPERCHOLESTEROLEMIA: ICD-10-CM

## 2021-10-21 DIAGNOSIS — E11.65 UNCONTROLLED TYPE 2 DIABETES MELLITUS WITH HYPERGLYCEMIA, WITHOUT LONG-TERM CURRENT USE OF INSULIN (HCC): ICD-10-CM

## 2021-10-21 DIAGNOSIS — F32.A MODERATE DEPRESSIVE EPISODE: Primary | ICD-10-CM

## 2021-10-21 DIAGNOSIS — E11.29 TYPE 2 DIABETES MELLITUS WITH MICROALBUMINURIA, WITHOUT LONG-TERM CURRENT USE OF INSULIN (HCC): ICD-10-CM

## 2021-10-21 PROBLEM — R42 LIGHTHEADEDNESS: Status: RESOLVED | Noted: 2019-09-25 | Resolved: 2021-10-21

## 2021-10-21 PROBLEM — R05.9 COUGH: Status: RESOLVED | Noted: 2019-04-30 | Resolved: 2021-10-21

## 2021-10-21 PROBLEM — N39.0 URINARY TRACT INFECTION WITHOUT HEMATURIA: Status: RESOLVED | Noted: 2021-10-05 | Resolved: 2021-10-21

## 2021-10-21 PROCEDURE — 3074F SYST BP LT 130 MM HG: CPT | Performed by: INTERNAL MEDICINE

## 2021-10-21 PROCEDURE — 3078F DIAST BP <80 MM HG: CPT | Performed by: INTERNAL MEDICINE

## 2021-10-21 PROCEDURE — 99214 OFFICE O/P EST MOD 30 MIN: CPT | Performed by: INTERNAL MEDICINE

## 2021-10-21 PROCEDURE — 3008F BODY MASS INDEX DOCD: CPT | Performed by: INTERNAL MEDICINE

## 2021-10-21 RX ORDER — ROSUVASTATIN CALCIUM 5 MG/1
5 TABLET, COATED ORAL DAILY
Qty: 90 TABLET | Refills: 1 | Status: SHIPPED | OUTPATIENT
Start: 2021-10-21

## 2021-10-21 NOTE — ASSESSMENT & PLAN NOTE
Patient's LDL went up to 147. I recommend that she do a CT heart to further stratify herself. Patient understands that she will have to pay for that test.  For now continue Zetia and Crestor.

## 2021-10-21 NOTE — PROGRESS NOTES
HPI:    Patient ID: Rosea Galeazzi is a 59year old female. HPI:  She is still depressed. she doesn't want to see a psychiatrist.  She is not suicidal or homicidal.  Pt has an appointment with the endocrinologist.    Past Surgical History:   Procedure Lat apply Misc 1 strip by In Vitro route 3 (three) times daily. 100 each 5   • ondansetron 4 MG Oral Tablet Dispersible Take 1-2 tablets (4-8 mg total) by mouth 2 (two) times daily as needed for Nausea.  60 tablet 11   • diphenhydrAMINE HCl 50 MG Oral Cap Take No    Family History   Problem Relation Age of Onset   • Heart Disease Father         Details unknown   • Hypertension Father    • Colon Cancer Maternal Grandmother    • Other (Kidney Cancer) Maternal Grandmother    • Colon Cancer Other         Nephew   • the past, but states none of them were very successful. Unprioritized    Hypercholesterolemia     Patient's LDL went up to 147. I recommend that she do a CT heart to further stratify herself.   Patient understands that she will have to pay for t

## 2021-10-21 NOTE — ASSESSMENT & PLAN NOTE
Patient has been contacted by the behavioral health nurse navigator and has phone numbers for psychiatrist and psychologist, however she has not called anyone. She has not scheduled any appointments. I urged her to do this.   She has been on multiple anti

## 2021-10-21 NOTE — ASSESSMENT & PLAN NOTE
Patient's A1c was 8.3. She feels like the glimepiride is making her gain weight. She does not tolerate Metformin. She has scheduled an appointment with the endocrinologist for 2 weeks from now.

## 2021-10-22 RX ORDER — ERGOCALCIFEROL 1.25 MG/1
50000 CAPSULE ORAL WEEKLY
Qty: 12 CAPSULE | Refills: 0 | Status: SHIPPED | OUTPATIENT
Start: 2021-10-22 | End: 2022-01-08

## 2021-10-22 RX ORDER — GLIMEPIRIDE 4 MG/1
TABLET ORAL
Qty: 180 TABLET | Refills: 1 | OUTPATIENT
Start: 2021-10-22

## 2021-10-22 RX ORDER — GLIMEPIRIDE 4 MG/1
4 TABLET ORAL 2 TIMES DAILY
Qty: 180 TABLET | Refills: 1 | Status: SHIPPED | OUTPATIENT
Start: 2021-10-22

## 2021-10-22 RX ORDER — ERGOCALCIFEROL 1.25 MG/1
CAPSULE ORAL
Qty: 12 CAPSULE | Refills: 0 | OUTPATIENT
Start: 2021-10-22

## 2021-11-02 ENCOUNTER — OFFICE VISIT (OUTPATIENT)
Dept: ENDOCRINOLOGY CLINIC | Facility: CLINIC | Age: 61
End: 2021-11-02
Payer: MEDICAID

## 2021-11-02 VITALS
RESPIRATION RATE: 16 BRPM | HEIGHT: 66 IN | BODY MASS INDEX: 22.66 KG/M2 | WEIGHT: 141 LBS | SYSTOLIC BLOOD PRESSURE: 109 MMHG | DIASTOLIC BLOOD PRESSURE: 56 MMHG | HEART RATE: 75 BPM

## 2021-11-02 DIAGNOSIS — E11.65 TYPE 2 DIABETES MELLITUS WITH HYPERGLYCEMIA, WITHOUT LONG-TERM CURRENT USE OF INSULIN (HCC): Primary | ICD-10-CM

## 2021-11-02 DIAGNOSIS — E03.9 ACQUIRED HYPOTHYROIDISM: ICD-10-CM

## 2021-11-02 DIAGNOSIS — E78.00 HYPERCHOLESTEROLEMIA: ICD-10-CM

## 2021-11-02 PROCEDURE — 99244 OFF/OP CNSLTJ NEW/EST MOD 40: CPT | Performed by: INTERNAL MEDICINE

## 2021-11-02 PROCEDURE — 83036 HEMOGLOBIN GLYCOSYLATED A1C: CPT | Performed by: INTERNAL MEDICINE

## 2021-11-02 PROCEDURE — 3078F DIAST BP <80 MM HG: CPT | Performed by: INTERNAL MEDICINE

## 2021-11-02 PROCEDURE — 3008F BODY MASS INDEX DOCD: CPT | Performed by: INTERNAL MEDICINE

## 2021-11-02 PROCEDURE — 3051F HG A1C>EQUAL 7.0%<8.0%: CPT | Performed by: INTERNAL MEDICINE

## 2021-11-02 PROCEDURE — 3074F SYST BP LT 130 MM HG: CPT | Performed by: INTERNAL MEDICINE

## 2021-11-02 PROCEDURE — 82947 ASSAY GLUCOSE BLOOD QUANT: CPT | Performed by: INTERNAL MEDICINE

## 2021-11-02 PROCEDURE — 36416 COLLJ CAPILLARY BLOOD SPEC: CPT | Performed by: INTERNAL MEDICINE

## 2021-11-02 NOTE — H&P
Name: Jose Miguel Ellis  Date: 11/2/2021    Referring Physician: Soraida Horta is a 59year old female who presents for evaluation and management of type 2 diabetes.  She was diagnosed with diabetes about many years both eyes 2 (two) times daily. , Disp: 18 mL, Rfl: 1  •  levothyroxine 88 MCG Oral Tab, Take 1 tablet (88 mcg total) by mouth before breakfast., Disp: 14 tablet, Rfl: 0  •  Fluticasone Propionate 50 MCG/ACT Nasal Suspension, 2 sprays by Nasal route daily. , TID, Disp: 1 kit, Rfl: 0  •  OxyCODONE HCl IR 30 MG Oral Tab, Take 30 mg by mouth as needed.   , Disp: , Rfl: 0     Allergies:     Radiology Contrast *    HIVES, SWELLING, SHORTNESS OF                            BREATH    Comment:Had some kind of contrast m No abnormal nodes noted  Musculoskeletal:  normal muscle strength and tone  Skin:  normal moisture and skin texture  Hair & Nails:  normal scalp hair     Hematologic:  no excessive bruising  Neuro:  sensory grossly intact and motor grossly intact, normal m cardiovascular disease.    - Ophtho- none, up to date  - Flu and Covid vaccine- up to date  - BP- at goal, not on meds  - Lipids- improved since before, we should check full lipid panel at next visit, on meds  - MAC- at goal, up to date  - CMP- up to date

## 2021-11-03 ENCOUNTER — TELEMEDICINE (OUTPATIENT)
Dept: INTERNAL MEDICINE CLINIC | Facility: CLINIC | Age: 61
End: 2021-11-03
Payer: MEDICAID

## 2021-11-03 DIAGNOSIS — E11.65 TYPE 2 DIABETES MELLITUS WITH HYPERGLYCEMIA, WITHOUT LONG-TERM CURRENT USE OF INSULIN (HCC): ICD-10-CM

## 2021-11-03 DIAGNOSIS — F32.A MODERATE DEPRESSIVE EPISODE: Primary | ICD-10-CM

## 2021-11-03 PROCEDURE — 99214 OFFICE O/P EST MOD 30 MIN: CPT | Performed by: INTERNAL MEDICINE

## 2021-11-03 RX ORDER — QUETIAPINE 25 MG/1
25 TABLET, FILM COATED ORAL NIGHTLY
Qty: 90 TABLET | Refills: 0 | Status: SHIPPED | OUTPATIENT
Start: 2021-11-03

## 2021-11-03 NOTE — PROGRESS NOTES
Video Progress Note    This visit is conducted using Telemedicine with live, interactive video and audio. Patient has been referred to the [facility] website at [email or web address] to review the yearly Consent to Treat document.     Patient understands an mg total) by mouth every morning. 180 capsule 1   • ezetimibe 10 MG Oral Tab Take 1 tablet (10 mg total) by mouth nightly. 90 tablet 0   • Azelastine HCl 0.05 % Ophthalmic Solution Place 1 drop into both eyes 2 (two) times daily.  18 mL 1   • levothyroxine • OxyCODONE HCl IR 30 MG Oral Tab Take 30 mg by mouth as needed. 0       Allergies:  Radiology Contrast *    HIVES, SWELLING, SHORTNESS OF                            BREATH    Comment:Had some kind of contrast many years ago with an             x-ray. Primary     Patient's depression is still moderate to severe. Her mother had  and now her best friend  4 days ago. She has not scheduled with a psychiatrist.  We will have her continue fluoxetine and trazodone and add Seroquel.   Patient is travel

## 2021-11-03 NOTE — ASSESSMENT & PLAN NOTE
Patient saw the endocrinologist yesterday and her A1c was 7.8. Januvia was added to her regimen.   She will follow up with the endocrinologist.

## 2021-11-03 NOTE — ASSESSMENT & PLAN NOTE
Patient's depression is still moderate to severe. Her mother had  and now her best friend  4 days ago. She has not scheduled with a psychiatrist.  We will have her continue fluoxetine and trazodone and add Seroquel.   Patient is traveling to [facility]

## 2021-11-09 RX ORDER — LEVOCETIRIZINE DIHYDROCHLORIDE 5 MG/1
5 TABLET, FILM COATED ORAL EVERY EVENING
Qty: 30 TABLET | Refills: 0 | Status: SHIPPED | OUTPATIENT
Start: 2021-11-09

## 2021-11-15 DIAGNOSIS — R11.0 CHRONIC NAUSEA: ICD-10-CM

## 2021-11-15 RX ORDER — MONTELUKAST SODIUM 10 MG/1
10 TABLET ORAL EVERY EVENING
Qty: 30 TABLET | Refills: 0 | Status: SHIPPED | OUTPATIENT
Start: 2021-11-15 | End: 2021-12-13

## 2021-11-15 NOTE — TELEPHONE ENCOUNTER
Refill requested for    Name from pharmacy: MONTELUKAST SODIUM 10MG TABLET         Will file in chart as: MONTELUKAST 10 MG Oral Tab    Sig: TAKE ONE TABLET BY MOUTH EACH EVENING    Disp:  90 tablet    Refills:  0    Start: 11/15/2021    Class: Normal    N

## 2021-11-16 ENCOUNTER — TELEPHONE (OUTPATIENT)
Dept: ALLERGY | Facility: CLINIC | Age: 61
End: 2021-11-16

## 2021-11-16 DIAGNOSIS — R05.9 COUGH: ICD-10-CM

## 2021-11-16 RX ORDER — ALBUTEROL SULFATE 90 UG/1
AEROSOL, METERED RESPIRATORY (INHALATION)
Qty: 6.7 G | Refills: 0 | Status: SHIPPED | OUTPATIENT
Start: 2021-11-16

## 2021-11-16 NOTE — TELEPHONE ENCOUNTER
Pt would like to inform the nurse that she did get her message. Pt states that she was getting in her car. Pt would like to thank the nurse for calling and states that she will speak with the doctor tomorrow.

## 2021-11-16 NOTE — TELEPHONE ENCOUNTER
Refill requested for   Name from pharmacy: ALBUTEROL SULFATE HFA HFA AEROSOL SOLN          Will file in chart as: ALBUTEROL 108 (90 Base) MCG/ACT Inhalation Aero Soln    Sig: INHALE 2 PUFFS INTO THE LUNGS EVERY 4 (FOUR) HOURS AS NEEDED FOR WHEEZING.     Dis

## 2021-11-16 NOTE — TELEPHONE ENCOUNTER
Left message for patient informing her that the refill she had requested was approved and sent to the pharmacy. Re-iterated if she has difficulty breathing, talking or swallowing she needs to go to urgent care or ER tonight.    Patient has a virtual visit w

## 2021-11-17 ENCOUNTER — VIRTUAL PHONE E/M (OUTPATIENT)
Dept: ALLERGY | Facility: CLINIC | Age: 61
End: 2021-11-17
Payer: MEDICAID

## 2021-11-17 DIAGNOSIS — J30.89 PERENNIAL ALLERGIC RHINITIS WITH SEASONAL VARIATION: ICD-10-CM

## 2021-11-17 DIAGNOSIS — Z87.891 FORMER SMOKER: ICD-10-CM

## 2021-11-17 DIAGNOSIS — Z87.09 HISTORY OF COPD: Primary | ICD-10-CM

## 2021-11-17 DIAGNOSIS — Z92.29 COVID-19 VACCINE SERIES COMPLETED: ICD-10-CM

## 2021-11-17 DIAGNOSIS — Z23 FLU VACCINE NEED: ICD-10-CM

## 2021-11-17 DIAGNOSIS — J30.2 PERENNIAL ALLERGIC RHINITIS WITH SEASONAL VARIATION: ICD-10-CM

## 2021-11-17 PROCEDURE — 99214 OFFICE O/P EST MOD 30 MIN: CPT | Performed by: ALLERGY & IMMUNOLOGY

## 2021-11-17 RX ORDER — TIOTROPIUM BROMIDE INHALATION SPRAY 1.56 UG/1
2 SPRAY, METERED RESPIRATORY (INHALATION) DAILY
Qty: 3 EACH | Refills: 1 | Status: SHIPPED | OUTPATIENT
Start: 2021-11-17

## 2021-11-17 RX ORDER — PREDNISONE 20 MG/1
TABLET ORAL
Qty: 10 TABLET | Refills: 0 | Status: SHIPPED | OUTPATIENT
Start: 2021-11-17

## 2021-11-17 NOTE — PATIENT INSTRUCTIONS
1. COPD  Restart Spiriva 2 puffs once a day  Albuterol as needed  Prednisone 40 mg once a day with food x5 days    2. AR  Continue with Xyzal and Singulair. Add Flonase 2 sprays per nostril once a day.   Start prednisone 40 mg once a day with food x5 days

## 2021-11-17 NOTE — PROGRESS NOTES
Brittany Valdez is a 59year old female. HPI:   No chief complaint on file. Patient is a 22-year-old female who presents for follow-up via telephone visit.     Virtual/Telephone Check-In    Brittany Valdez verbally consents to a Virtual/Telephone Check Grandmother    • Colon Cancer Other         Nephew   • Heart Disease Mother         Details unknown   • Hypertension Brother    • Colon Polyps Brother    • Colon Cancer Cousin    • Other (Other) Paternal Aunt         crohns disease,dementia   • Diabetes Ne capsule 1   • ezetimibe 10 MG Oral Tab Take 1 tablet (10 mg total) by mouth nightly. 90 tablet 0   • Azelastine HCl 0.05 % Ophthalmic Solution Place 1 drop into both eyes 2 (two) times daily.  18 mL 1   • levothyroxine 88 MCG Oral Tab Take 1 tablet (88 mcg lethargy  ENMT:  Negative for ear drainage, hearing loss.  See hpi   Eyes:  Negative for eye discharge and vision loss  Gastrointestinal:  Negative for abdominal pain, diarrhea and vomiting  Integumentary:  Negative for pruritus and rash  Respiratory:   See Prescriptions     Signed Prescriptions Disp Refills   • predniSONE 20 MG Oral Tab 10 tablet 0     Sig: Take 2 tabs(40 mg) once a day with food x 5 days   • Tiotropium Bromide Monohydrate (SPIRIVA RESPIMAT) 1.25 MCG/ACT Inhalation Aero Soln 3 each 1     Sig

## 2021-11-23 RX ORDER — PROCHLORPERAZINE MALEATE 5 MG/1
5 TABLET ORAL NIGHTLY PRN
Qty: 30 TABLET | Refills: 3 | Status: SHIPPED | OUTPATIENT
Start: 2021-11-23 | End: 2022-03-20

## 2021-11-29 ENCOUNTER — LAB ENCOUNTER (OUTPATIENT)
Dept: LAB | Facility: HOSPITAL | Age: 61
End: 2021-11-29
Attending: INTERNAL MEDICINE
Payer: MEDICAID

## 2021-11-29 ENCOUNTER — TELEPHONE (OUTPATIENT)
Dept: ENDOCRINOLOGY CLINIC | Facility: CLINIC | Age: 61
End: 2021-11-29

## 2021-11-29 ENCOUNTER — OFFICE VISIT (OUTPATIENT)
Dept: ALLERGY | Facility: CLINIC | Age: 61
End: 2021-11-29
Payer: MEDICAID

## 2021-11-29 ENCOUNTER — TELEPHONE (OUTPATIENT)
Dept: ALLERGY | Facility: CLINIC | Age: 61
End: 2021-11-29

## 2021-11-29 VITALS
HEIGHT: 66 IN | BODY MASS INDEX: 22.66 KG/M2 | HEART RATE: 92 BPM | DIASTOLIC BLOOD PRESSURE: 80 MMHG | WEIGHT: 141 LBS | OXYGEN SATURATION: 97 % | SYSTOLIC BLOOD PRESSURE: 134 MMHG

## 2021-11-29 DIAGNOSIS — E03.9 ACQUIRED HYPOTHYROIDISM: Primary | ICD-10-CM

## 2021-11-29 DIAGNOSIS — J30.89 PERENNIAL ALLERGIC RHINITIS WITH SEASONAL VARIATION: ICD-10-CM

## 2021-11-29 DIAGNOSIS — Z23 FLU VACCINE NEED: ICD-10-CM

## 2021-11-29 DIAGNOSIS — J30.2 PERENNIAL ALLERGIC RHINITIS WITH SEASONAL VARIATION: ICD-10-CM

## 2021-11-29 DIAGNOSIS — Z87.09 HISTORY OF COPD: Primary | ICD-10-CM

## 2021-11-29 DIAGNOSIS — E03.9 ACQUIRED HYPOTHYROIDISM: ICD-10-CM

## 2021-11-29 DIAGNOSIS — E55.9 VITAMIN D DEFICIENCY: ICD-10-CM

## 2021-11-29 PROCEDURE — 3075F SYST BP GE 130 - 139MM HG: CPT | Performed by: ALLERGY & IMMUNOLOGY

## 2021-11-29 PROCEDURE — 90686 IIV4 VACC NO PRSV 0.5 ML IM: CPT | Performed by: ALLERGY & IMMUNOLOGY

## 2021-11-29 PROCEDURE — 90471 IMMUNIZATION ADMIN: CPT | Performed by: ALLERGY & IMMUNOLOGY

## 2021-11-29 PROCEDURE — 3008F BODY MASS INDEX DOCD: CPT | Performed by: ALLERGY & IMMUNOLOGY

## 2021-11-29 PROCEDURE — 82306 VITAMIN D 25 HYDROXY: CPT

## 2021-11-29 PROCEDURE — 84443 ASSAY THYROID STIM HORMONE: CPT

## 2021-11-29 PROCEDURE — 36415 COLL VENOUS BLD VENIPUNCTURE: CPT

## 2021-11-29 PROCEDURE — 84439 ASSAY OF FREE THYROXINE: CPT

## 2021-11-29 PROCEDURE — 99214 OFFICE O/P EST MOD 30 MIN: CPT | Performed by: ALLERGY & IMMUNOLOGY

## 2021-11-29 PROCEDURE — 3079F DIAST BP 80-89 MM HG: CPT | Performed by: ALLERGY & IMMUNOLOGY

## 2021-11-29 RX ORDER — BUDESONIDE AND FORMOTEROL FUMARATE DIHYDRATE 80; 4.5 UG/1; UG/1
2 AEROSOL RESPIRATORY (INHALATION) 2 TIMES DAILY
Qty: 3 EACH | Refills: 0 | Status: SHIPPED | OUTPATIENT
Start: 2021-11-29

## 2021-11-29 RX ORDER — AZELASTINE HYDROCHLORIDE 0.5 MG/ML
1 SOLUTION/ DROPS OPHTHALMIC 2 TIMES DAILY
Qty: 3 EACH | Refills: 0 | Status: SHIPPED | OUTPATIENT
Start: 2021-11-29

## 2021-11-29 RX ORDER — DOXYCYCLINE HYCLATE 100 MG/1
100 CAPSULE ORAL 2 TIMES DAILY
Qty: 20 CAPSULE | Refills: 0 | Status: SHIPPED | OUTPATIENT
Start: 2021-11-29 | End: 2021-12-09

## 2021-11-29 RX ORDER — LEVOCETIRIZINE DIHYDROCHLORIDE 5 MG/1
5 TABLET, FILM COATED ORAL NIGHTLY
Qty: 90 TABLET | Refills: 1 | Status: SHIPPED | OUTPATIENT
Start: 2021-11-29

## 2021-11-29 NOTE — TELEPHONE ENCOUNTER
Medication PA requested:     Budesonide-Formoterol Fumarate (SYMBICORT) 80-4.5 MCG/ACT Inhalation Aerosol 3 each 0 11/29/2021    Sig:   Inhale 2 puffs into the lungs 2 (two) times daily.      Route:   Inhalation         Dx Code:    History of COPD Z87.09

## 2021-11-29 NOTE — PROGRESS NOTES
Rg Navarrete is a 59year old female. HPI:   Patient presents with:   Allergies: patient presents for follow up for allergies, states she feels dizzy since last night and has some chest tightness and shortness of breath, and a wet cough, congestion is g crohns disease,dementia   • Diabetes Neg    • Glaucoma Neg       Social History: Social History    Tobacco Use      Smoking status: Current Some Day Smoker        Years: 45.00        Types: Cigarettes        Quit date: 11/12/2018        [facility] • Azelastine HCl 0.05 % Ophthalmic Solution Place 1 drop into both eyes 2 (two) times daily.  18 mL 1   • levothyroxine 88 MCG Oral Tab Take 1 tablet (88 mcg total) by mouth before breakfast. 14 tablet 0   • Fluticasone Propionate 50 MCG/ACT Nasal Suspens vision loss  Gastrointestinal:  Negative for abdominal pain, diarrhea and vomiting  Integumentary:  Negative for pruritus and rash  Respiratory: see hpi     PHYSICAL EXAM:   Constitutional: responsive, no acute distress noted  Head/Face: NC/Atraumatic  Eye related to self administration of these medications. Teaching, instruction and sample was provided to the patient by myself. Teaching included  a review of potential adverse side effects as well as potential efficacy.   Patient's questions were answered i

## 2021-11-29 NOTE — PATIENT INSTRUCTIONS
1. Copd  + flare   Improved after prednisone from 2 weeks ago. Still with mild cough now with mucopurulence. Start doxycycline 100 mg twice a day.   Start Symbicort 80/4.52 puffs twice a day  Continue with Spiriva 2 puffs once a day  If not improving with

## 2021-11-30 NOTE — TELEPHONE ENCOUNTER
Hi!  Can we confirm how this patient is taking her levothyroxine? Her most recent TSH is elevated, and I think she has been on this dose (88mcg) for some time now. Thank you!

## 2021-12-01 NOTE — TELEPHONE ENCOUNTER
Dr. Ruddy Little, patient confirms taking levothyroxine 88mcg every day on an empty stomach. Has not missed any doses.

## 2021-12-02 NOTE — TELEPHONE ENCOUNTER
Medication PA Requested:  Budesonide-Formoterol Fumarate (SYMBICORT) 80-4.5 MCG/ACT                                                        CoverMyMeds Used:  Key:  Quantity:  Day Supply:  Sig:  Inhale 2 puffs into the lungs 2 (two) times daily.   DX Code:

## 2021-12-03 RX ORDER — LEVOTHYROXINE SODIUM 112 UG/1
112 TABLET ORAL
Qty: 90 TABLET | Refills: 0 | Status: SHIPPED | OUTPATIENT
Start: 2021-12-03 | End: 2022-03-03

## 2021-12-03 NOTE — TELEPHONE ENCOUNTER
Hi!    In that case, I would like to increase her dose to 112mcg PO qday and recheck the thyroid function tests in 3 months. Thank you!

## 2021-12-03 NOTE — TELEPHONE ENCOUNTER
rn called patient with message by dr Gricelda Duenas, pt verbalized understanding ,states that is reason she is so tired and achy all the time. Will start new dose tomorrow.   Also reviewed low carb diet with patient , adding vegetables and watching amount of f

## 2021-12-04 NOTE — TELEPHONE ENCOUNTER
Received a fax from [facility] regarding PA for Symbicort Inhaler stating patient tried to refill too soon, medication does not need a PA.     Left a message for patient regarding fax from her insurance stating does not need a PA, patient tried to refil

## 2021-12-04 NOTE — TELEPHONE ENCOUNTER
Patient stated she is confused and didn't request a refill. Patient stated she did get a script from Dr. Joseph Choi on 11/29.

## 2021-12-04 NOTE — TELEPHONE ENCOUNTER
Spoke with patient. Verified name and . Clarified request for Symbicort inhaler was too soon and medication does not need a PA per her insurance. Patient verbalizes understanding.

## 2021-12-13 RX ORDER — MONTELUKAST SODIUM 10 MG/1
10 TABLET ORAL EVERY EVENING
Qty: 90 TABLET | Refills: 1 | Status: SHIPPED | OUTPATIENT
Start: 2021-12-13 | End: 2022-06-07

## 2021-12-16 RX ORDER — LANCETS 33 GAUGE
1 EACH MISCELLANEOUS 3 TIMES DAILY
Qty: 300 EACH | Refills: 3 | Status: SHIPPED | OUTPATIENT
Start: 2021-12-16 | End: 2023-03-07

## 2021-12-22 ENCOUNTER — TELEPHONE (OUTPATIENT)
Dept: ENDOCRINOLOGY CLINIC | Facility: CLINIC | Age: 61
End: 2021-12-22

## 2021-12-22 NOTE — TELEPHONE ENCOUNTER
pt states that she has had contact with someone who is positive with covid-19 and now pt has a cough and sore throat. Pt. Wants to know if she should resched today's 2:00pm appt. , or be seen virtually?  Pt. States that she has has the covid-19 vaccine, but

## 2021-12-22 NOTE — TELEPHONE ENCOUNTER
Spoke to patient regarding note below. Notified patient that she cannot be seen today in clinic if she is exhibiting symptoms of covid. Patient verbalized understanding and rescheduled appointment for January.

## 2022-01-06 DIAGNOSIS — E78.00 HYPERCHOLESTEROLEMIA: ICD-10-CM

## 2022-01-07 RX ORDER — EZETIMIBE 10 MG/1
10 TABLET ORAL NIGHTLY
Qty: 90 TABLET | Refills: 0 | Status: SHIPPED | OUTPATIENT
Start: 2022-01-07

## 2022-01-08 RX ORDER — ERGOCALCIFEROL 1.25 MG/1
CAPSULE ORAL
Qty: 12 CAPSULE | Refills: 0 | Status: SHIPPED | OUTPATIENT
Start: 2022-01-08

## 2022-01-14 RX ORDER — FLUOXETINE HYDROCHLORIDE 40 MG/1
40 CAPSULE ORAL EVERY MORNING
Qty: 90 CAPSULE | Refills: 1 | Status: SHIPPED | OUTPATIENT
Start: 2022-01-14

## 2022-01-14 NOTE — TELEPHONE ENCOUNTER
Refill passed per [facility] protocol.     Requested Prescriptions   Pending Prescriptions Disp Refills    FLUOXETINE HCL 40 MG Oral Cap [Pharmacy Med Name: FLUOXETINE HYDROCHLORIDE 40MG CAPSULE] 30 capsule 0     Sig: TAKE ONE CAPSULE BY MOUTH EVERY MO

## 2022-02-09 ENCOUNTER — OFFICE VISIT (OUTPATIENT)
Dept: ENDOCRINOLOGY CLINIC | Facility: CLINIC | Age: 62
End: 2022-02-09
Payer: MEDICAID

## 2022-02-09 ENCOUNTER — LAB ENCOUNTER (OUTPATIENT)
Dept: LAB | Facility: HOSPITAL | Age: 62
End: 2022-02-09
Attending: INTERNAL MEDICINE
Payer: MEDICAID

## 2022-02-09 VITALS
DIASTOLIC BLOOD PRESSURE: 59 MMHG | SYSTOLIC BLOOD PRESSURE: 111 MMHG | BODY MASS INDEX: 23 KG/M2 | WEIGHT: 144.63 LBS | HEART RATE: 78 BPM

## 2022-02-09 DIAGNOSIS — E11.65 TYPE 2 DIABETES MELLITUS WITH HYPERGLYCEMIA, WITHOUT LONG-TERM CURRENT USE OF INSULIN (HCC): Primary | ICD-10-CM

## 2022-02-09 DIAGNOSIS — E78.00 HYPERCHOLESTEROLEMIA: ICD-10-CM

## 2022-02-09 DIAGNOSIS — E03.9 ACQUIRED HYPOTHYROIDISM: ICD-10-CM

## 2022-02-09 LAB
ALBUMIN SERPL-MCNC: 4.1 G/DL (ref 3.4–5)
ALBUMIN/GLOB SERPL: 1.6 {RATIO} (ref 1–2)
ALP LIVER SERPL-CCNC: 61 U/L
ALT SERPL-CCNC: 36 U/L
ANION GAP SERPL CALC-SCNC: 4 MMOL/L (ref 0–18)
AST SERPL-CCNC: 16 U/L (ref 15–37)
BILIRUB SERPL-MCNC: 0.2 MG/DL (ref 0.1–2)
BUN BLD-MCNC: 15 MG/DL (ref 7–18)
BUN/CREAT SERPL: 18.1 (ref 10–20)
CALCIUM BLD-MCNC: 9.5 MG/DL (ref 8.5–10.1)
CARTRIDGE LOT#: ABNORMAL NUMERIC
CHLORIDE SERPL-SCNC: 105 MMOL/L (ref 98–112)
CHOLEST SERPL-MCNC: 272 MG/DL (ref ?–200)
CO2 SERPL-SCNC: 31 MMOL/L (ref 21–32)
CREAT BLD-MCNC: 0.83 MG/DL
CREAT UR-SCNC: 159 MG/DL
FASTING PATIENT LIPID ANSWER: NO
FASTING STATUS PATIENT QL REPORTED: NO
GLOBULIN PLAS-MCNC: 2.6 G/DL (ref 2.8–4.4)
GLUCOSE BLD-MCNC: 120 MG/DL (ref 70–99)
GLUCOSE BLOOD: 173
HDLC SERPL-MCNC: 69 MG/DL (ref 40–59)
HEMOGLOBIN A1C: 8 % (ref 4.3–5.6)
LDLC SERPL CALC-MCNC: 182 MG/DL (ref ?–100)
LDLC SERPL DIRECT ASSAY-MCNC: 171 MG/DL (ref ?–100)
MICROALBUMIN UR-MCNC: 1.35 MG/DL
MICROALBUMIN/CREAT 24H UR-RTO: 8.5 UG/MG (ref ?–30)
NONHDLC SERPL-MCNC: 203 MG/DL (ref ?–130)
OSMOLALITY SERPL CALC.SUM OF ELEC: 292 MOSM/KG (ref 275–295)
POTASSIUM SERPL-SCNC: 4.1 MMOL/L (ref 3.5–5.1)
PROT SERPL-MCNC: 6.7 G/DL (ref 6.4–8.2)
SODIUM SERPL-SCNC: 140 MMOL/L (ref 136–145)
T4 FREE SERPL-MCNC: 1.1 NG/DL (ref 0.8–1.7)
TEST STRIP LOT #: NORMAL NUMERIC
TRIGL SERPL-MCNC: 121 MG/DL (ref 30–149)
TSI SER-ACNC: 0.35 MIU/ML (ref 0.36–3.74)
VLDLC SERPL CALC-MCNC: 25 MG/DL (ref 0–30)

## 2022-02-09 PROCEDURE — 99214 OFFICE O/P EST MOD 30 MIN: CPT | Performed by: INTERNAL MEDICINE

## 2022-02-09 PROCEDURE — 36416 COLLJ CAPILLARY BLOOD SPEC: CPT | Performed by: INTERNAL MEDICINE

## 2022-02-09 PROCEDURE — 82947 ASSAY GLUCOSE BLOOD QUANT: CPT | Performed by: INTERNAL MEDICINE

## 2022-02-09 PROCEDURE — 80061 LIPID PANEL: CPT | Performed by: INTERNAL MEDICINE

## 2022-02-09 PROCEDURE — 82043 UR ALBUMIN QUANTITATIVE: CPT | Performed by: INTERNAL MEDICINE

## 2022-02-09 PROCEDURE — 82570 ASSAY OF URINE CREATININE: CPT | Performed by: INTERNAL MEDICINE

## 2022-02-09 PROCEDURE — 83036 HEMOGLOBIN GLYCOSYLATED A1C: CPT | Performed by: INTERNAL MEDICINE

## 2022-02-09 PROCEDURE — 83721 ASSAY OF BLOOD LIPOPROTEIN: CPT | Performed by: INTERNAL MEDICINE

## 2022-02-09 PROCEDURE — 84439 ASSAY OF FREE THYROXINE: CPT

## 2022-02-09 PROCEDURE — 3074F SYST BP LT 130 MM HG: CPT | Performed by: INTERNAL MEDICINE

## 2022-02-09 PROCEDURE — 36415 COLL VENOUS BLD VENIPUNCTURE: CPT | Performed by: INTERNAL MEDICINE

## 2022-02-09 PROCEDURE — 3078F DIAST BP <80 MM HG: CPT | Performed by: INTERNAL MEDICINE

## 2022-02-09 PROCEDURE — 3052F HG A1C>EQUAL 8.0%<EQUAL 9.0%: CPT | Performed by: INTERNAL MEDICINE

## 2022-02-09 PROCEDURE — 80053 COMPREHEN METABOLIC PANEL: CPT | Performed by: INTERNAL MEDICINE

## 2022-02-09 PROCEDURE — 84443 ASSAY THYROID STIM HORMONE: CPT

## 2022-02-13 ENCOUNTER — TELEPHONE (OUTPATIENT)
Dept: ENDOCRINOLOGY CLINIC | Facility: CLINIC | Age: 62
End: 2022-02-13

## 2022-02-13 RX ORDER — ROSUVASTATIN CALCIUM 10 MG/1
10 TABLET, COATED ORAL NIGHTLY
Qty: 90 TABLET | Refills: 0 | Status: SHIPPED | OUTPATIENT
Start: 2022-02-13 | End: 2022-02-16

## 2022-02-13 NOTE — TELEPHONE ENCOUNTER
Hi!    Can we please contact this patient and let her know that I have reviewed her blood tests and urine tests and I would only like to increase her rosuvastatin from 5 to 10mg PO at bedtime. In addition, I had increased her levothyroxine from 88mcg to 112mcg at the last visit, and I would like to decrease it to 100mcg and recheck her lipid panel and TSH and FT4 in three months. She is doing very well otherwise. Thank you!

## 2022-02-14 RX ORDER — LEVOTHYROXINE SODIUM 0.1 MG/1
100 TABLET ORAL
Qty: 90 TABLET | Refills: 0 | Status: SHIPPED | OUTPATIENT
Start: 2022-02-14 | End: 2022-05-15

## 2022-02-14 NOTE — TELEPHONE ENCOUNTER
Hi!  Please ask her if she has tried any of the other statins, and if so, does she get the cramping with any of the other statins? If she does, then she must be advised the low fat diet. Thank you!

## 2022-02-14 NOTE — TELEPHONE ENCOUNTER
Dr. Dimple Levi, patient says she was not fasting for labs. She also occassionally gets leg cramps related to rosuvastatin. She is willing try increased 10mg dose but wanted to let you know. Please advise if she should try increased dose still? She verbalizes understanding of the rest of the note.

## 2022-02-16 RX ORDER — ATORVASTATIN CALCIUM 20 MG/1
20 TABLET, FILM COATED ORAL NIGHTLY
Qty: 90 TABLET | Refills: 0 | Status: SHIPPED | OUTPATIENT
Start: 2022-02-16 | End: 2022-03-31

## 2022-02-16 NOTE — TELEPHONE ENCOUNTER
Spoke with patient states she has not tried other statins before would be open to trying them if you felt may be beneficial. Please advise. Thanks.

## 2022-02-17 NOTE — TELEPHONE ENCOUNTER
Dr. Sudheer Shipley    Can patient continue Zetia while taking Atorvastatin? Patient is taking 10 mg nightly. I advised patient to discontinue Rouvastatin.

## 2022-02-22 RX ORDER — BUDESONIDE AND FORMOTEROL FUMARATE DIHYDRATE 80; 4.5 UG/1; UG/1
AEROSOL RESPIRATORY (INHALATION)
Qty: 3 EACH | Refills: 0 | Status: SHIPPED | OUTPATIENT
Start: 2022-02-22

## 2022-03-14 ENCOUNTER — NURSE TRIAGE (OUTPATIENT)
Dept: INTERNAL MEDICINE CLINIC | Facility: CLINIC | Age: 62
End: 2022-03-14

## 2022-03-15 PROBLEM — F32.A MODERATE DEPRESSIVE EPISODE: Status: RESOLVED | Noted: 2021-10-05 | Resolved: 2022-03-15

## 2022-03-15 PROBLEM — F32.A MILD DEPRESSION: Status: RESOLVED | Noted: 2018-04-16 | Resolved: 2022-03-15

## 2022-03-15 PROBLEM — F33.1 MODERATE RECURRENT MAJOR DEPRESSION (HCC): Status: ACTIVE | Noted: 2022-03-15

## 2022-03-15 PROBLEM — H57.13 PAIN OF BOTH EYES: Status: ACTIVE | Noted: 2022-03-15

## 2022-03-15 PROBLEM — E11.65 TYPE 2 DIABETES MELLITUS WITH HYPERGLYCEMIA, WITHOUT LONG-TERM CURRENT USE OF INSULIN (HCC): Status: RESOLVED | Noted: 2017-05-10 | Resolved: 2022-03-15

## 2022-03-15 NOTE — ASSESSMENT & PLAN NOTE
The patient saw endocrinology, her A1c was high, and her medications were changed. She does not check her blood sugars at home. We will check it today.

## 2022-03-15 NOTE — ASSESSMENT & PLAN NOTE
Patient had what sounded like blepharitis or an allergic reaction bilaterally. It has almost completely resolved.   If it does not continue to resolve, advised patient to see the ophthalmologist.

## 2022-03-15 NOTE — ASSESSMENT & PLAN NOTE
Patient's depression is moderate to severe. She is not suicidal or homicidal but she is apathetic. She says she stopped taking the Seroquel because it was not helping. She is not sure what medication she is taking. I have referred her to therapy in the past but she is unmotivated to schedule an appointment. I will try referring her again. I advised her to bring her medication bottles to her next appointment.

## 2022-03-20 RX ORDER — PROCHLORPERAZINE MALEATE 5 MG/1
TABLET ORAL
Qty: 30 TABLET | Refills: 3 | Status: SHIPPED | OUTPATIENT
Start: 2022-03-20

## 2022-03-31 RX ORDER — ATORVASTATIN CALCIUM 20 MG/1
TABLET, FILM COATED ORAL
Qty: 90 TABLET | Refills: 0 | Status: SHIPPED | OUTPATIENT
Start: 2022-03-31

## 2022-03-31 NOTE — TELEPHONE ENCOUNTER
LOV 02/09/22. RTC 3 months. No f/u. Called for first available. Booked 04/20/22. Pended 3 month supply.

## 2022-04-02 NOTE — TELEPHONE ENCOUNTER
Please review. Protocol failed / No protocol.   Requested Prescriptions   Pending Prescriptions Disp Refills    ERGOCALCIFEROL 1.25 MG (70649 UT) Oral Cap [Pharmacy Med Name: VITAMIN D 50802RPP CAPSULE] 12 capsule 0     Sig: TAKE ONE CAPSULE BY MOUTH ONCE A WEEK        There is no refill protocol information for this order          Recent Outpatient Visits              2 weeks ago Moderate recurrent major depression [facility])    [facility], [address], Marie Gagnon MD    Office Visit    1 month ago Type 2 diabetes mellitus with hyperglycemia, without long-term current use of insulin [facility])    [facility] Endocrinology Cynthia Allen MD    Office Visit    4 months ago History of COPD    [facility], [address], Rosalio Fuentes MD    Office Visit    4 months ago History of COPD    [facility], [address], Rosalio Fuentes MD    [employer] [facility]    5 months ago Moderate depressive episode [facility])    [facility], [address], Marie Gagnon MD    Telemedicine          Future Appointments         Provider Department Appt Notes    In 1 week Tye Stewart MD [facility], [address] [facility] and pap - policy informed    In 2 weeks Cynthia Allen, [address] [place] [address] Endocrinology

## 2022-04-03 RX ORDER — ERGOCALCIFEROL 1.25 MG/1
CAPSULE ORAL
Qty: 12 CAPSULE | Refills: 0 | Status: SHIPPED | OUTPATIENT
Start: 2022-04-03

## 2022-04-11 ENCOUNTER — OFFICE VISIT (OUTPATIENT)
Dept: OBGYN CLINIC | Facility: CLINIC | Age: 62
End: 2022-04-11
Payer: MEDICAID

## 2022-04-11 VITALS — SYSTOLIC BLOOD PRESSURE: 118 MMHG | DIASTOLIC BLOOD PRESSURE: 76 MMHG | WEIGHT: 142 LBS | BODY MASS INDEX: 23 KG/M2

## 2022-04-11 DIAGNOSIS — Z12.31 ENCOUNTER FOR SCREENING MAMMOGRAM FOR MALIGNANT NEOPLASM OF BREAST: ICD-10-CM

## 2022-04-11 DIAGNOSIS — Z01.419 WELL WOMAN EXAM WITH ROUTINE GYNECOLOGICAL EXAM: Primary | ICD-10-CM

## 2022-04-11 PROCEDURE — 87624 HPV HI-RISK TYP POOLED RSLT: CPT | Performed by: OBSTETRICS & GYNECOLOGY

## 2022-04-11 RX ORDER — FLUOXETINE HYDROCHLORIDE 20 MG/1
40 CAPSULE ORAL EVERY MORNING
COMMUNITY
Start: 2022-03-17

## 2022-04-12 LAB — HPV I/H RISK 1 DNA SPEC QL NAA+PROBE: NEGATIVE

## 2022-04-20 ENCOUNTER — OFFICE VISIT (OUTPATIENT)
Dept: ENDOCRINOLOGY CLINIC | Facility: CLINIC | Age: 62
End: 2022-04-20
Payer: MEDICAID

## 2022-04-20 VITALS
DIASTOLIC BLOOD PRESSURE: 66 MMHG | HEIGHT: 66 IN | HEART RATE: 84 BPM | WEIGHT: 139 LBS | SYSTOLIC BLOOD PRESSURE: 107 MMHG | BODY MASS INDEX: 22.34 KG/M2

## 2022-04-20 DIAGNOSIS — E11.65 TYPE 2 DIABETES MELLITUS WITH HYPERGLYCEMIA, WITHOUT LONG-TERM CURRENT USE OF INSULIN (HCC): Primary | ICD-10-CM

## 2022-04-20 DIAGNOSIS — E03.9 ACQUIRED HYPOTHYROIDISM: ICD-10-CM

## 2022-04-20 DIAGNOSIS — E78.00 HYPERCHOLESTEROLEMIA: ICD-10-CM

## 2022-04-20 LAB
CARTRIDGE LOT#: ABNORMAL NUMERIC
GLUCOSE BLOOD: 267
HEMOGLOBIN A1C: 8.4 % (ref 4.3–5.6)
TEST STRIP LOT #: NORMAL NUMERIC

## 2022-04-20 PROCEDURE — 36416 COLLJ CAPILLARY BLOOD SPEC: CPT | Performed by: INTERNAL MEDICINE

## 2022-04-20 PROCEDURE — 83036 HEMOGLOBIN GLYCOSYLATED A1C: CPT | Performed by: INTERNAL MEDICINE

## 2022-04-20 PROCEDURE — 99214 OFFICE O/P EST MOD 30 MIN: CPT | Performed by: INTERNAL MEDICINE

## 2022-04-20 PROCEDURE — 3074F SYST BP LT 130 MM HG: CPT | Performed by: INTERNAL MEDICINE

## 2022-04-20 PROCEDURE — 3052F HG A1C>EQUAL 8.0%<EQUAL 9.0%: CPT | Performed by: INTERNAL MEDICINE

## 2022-04-20 PROCEDURE — 3078F DIAST BP <80 MM HG: CPT | Performed by: INTERNAL MEDICINE

## 2022-04-20 PROCEDURE — 82947 ASSAY GLUCOSE BLOOD QUANT: CPT | Performed by: INTERNAL MEDICINE

## 2022-04-20 PROCEDURE — 3008F BODY MASS INDEX DOCD: CPT | Performed by: INTERNAL MEDICINE

## 2022-04-22 RX ORDER — GLIMEPIRIDE 4 MG/1
4 TABLET ORAL 2 TIMES DAILY
Qty: 180 TABLET | Refills: 1 | Status: SHIPPED | OUTPATIENT
Start: 2022-04-22

## 2022-04-22 NOTE — TELEPHONE ENCOUNTER
Please review; protocol failed/No Protcol    Requested Prescriptions   Pending Prescriptions Disp Refills    GLIMEPIRIDE 4 MG Oral Tab [Pharmacy Med Name: GLIMEPIRIDE 4MG TABLET] 180 tablet 1     Sig: TAKE ONE TABLET BY MOUTH TWICE A DAY        Diabetes Medication Protocol Failed - 4/22/2022 12:29 PM        Failed - Last A1C < 7.5 and within past 6 months     Lab Results   Component Value Date    A1C 8.4 (A) 04/20/2022               Passed - Appointment in past 6 or next 3 months        Passed - GFR Non- > 50     Lab Results   Component Value Date    GFRNAA 75 02/09/2022                 Passed - GFR in the past 12 months              Recent Outpatient Visits              2 days ago Type 2 diabetes mellitus with hyperglycemia, without long-term current use of insulin [facility])    [facility] Endocrinology Meri Tay MD    Office Visit    1 week ago Well woman exam with routine gynecological exam    [facility], [address] [facility], [address] [place] [address] Michelle Echols MD    Office Visit    1 month ago Moderate recurrent major depression [facility])    [facility], [address], Gabrielle Magallon MD    Office Visit    2 months ago Type 2 diabetes mellitus with hyperglycemia, without long-term current use of insulin [facility])    [facility] Endocrinology Meri Tay MD    Office Visit    4 months ago History of COPD    Miah Pickard MD    Office Visit            Future Appointments         Provider Department Appt Notes    In 4 days [address] Endocrinology diabetes education and diet    In 1 month Denice Grewal MD [facility], [address] 3 mo f/u   care policy informed     In 3 months Delfina Sargent MD [facility] Ophthal[facility] NP, DM EE, Dr Navarro Mcgraw pt normal performance

## 2022-04-25 ENCOUNTER — TELEPHONE (OUTPATIENT)
Dept: OBGYN CLINIC | Facility: CLINIC | Age: 62
End: 2022-04-25

## 2022-04-25 NOTE — TELEPHONE ENCOUNTER
[employer] message sent to pt.    ----- Message from Desirae Marino MD sent at 4/25/2022  5:41 PM CDT -----  Normal pap, neg hr hpv.

## 2022-04-29 ENCOUNTER — NURSE ONLY (OUTPATIENT)
Dept: ENDOCRINOLOGY CLINIC | Facility: CLINIC | Age: 62
End: 2022-04-29
Payer: MEDICAID

## 2022-04-29 VITALS — BODY MASS INDEX: 23 KG/M2 | WEIGHT: 141.63 LBS

## 2022-04-29 DIAGNOSIS — E11.65 TYPE 2 DIABETES MELLITUS WITH HYPERGLYCEMIA, WITHOUT LONG-TERM CURRENT USE OF INSULIN (HCC): Primary | ICD-10-CM

## 2022-04-29 PROCEDURE — 99211 OFF/OP EST MAY X REQ PHY/QHP: CPT | Performed by: INTERNAL MEDICINE

## 2022-05-09 DIAGNOSIS — E03.9 ACQUIRED HYPOTHYROIDISM: ICD-10-CM

## 2022-05-09 RX ORDER — BUDESONIDE AND FORMOTEROL FUMARATE DIHYDRATE 80; 4.5 UG/1; UG/1
AEROSOL RESPIRATORY (INHALATION)
Qty: 30.6 G | Refills: 0 | Status: SHIPPED | OUTPATIENT
Start: 2022-05-09

## 2022-05-10 RX ORDER — LEVOTHYROXINE SODIUM 0.1 MG/1
100 TABLET ORAL
Qty: 90 TABLET | Refills: 0 | Status: SHIPPED | OUTPATIENT
Start: 2022-05-10 | End: 2022-08-08

## 2022-05-10 RX ORDER — ALBUTEROL SULFATE 90 UG/1
AEROSOL, METERED RESPIRATORY (INHALATION)
Qty: 8.5 G | Refills: 2 | Status: SHIPPED | OUTPATIENT
Start: 2022-05-10

## 2022-05-10 NOTE — TELEPHONE ENCOUNTER
Patient returned call and reports that her asthma is very well managed and she needs refill for albuterol because she will be going to [place]. She denies any asthma difficulties and reports that she rarely uses her albuterol. Refill sent per protocol.

## 2022-05-12 DIAGNOSIS — E78.00 HYPERCHOLESTEROLEMIA: ICD-10-CM

## 2022-05-12 DIAGNOSIS — E11.65 TYPE 2 DIABETES MELLITUS WITH HYPERGLYCEMIA, WITHOUT LONG-TERM CURRENT USE OF INSULIN (HCC): ICD-10-CM

## 2022-05-18 RX ORDER — ROSUVASTATIN CALCIUM 10 MG/1
TABLET, COATED ORAL
Qty: 90 TABLET | Refills: 0 | OUTPATIENT
Start: 2022-05-18

## 2022-06-07 RX ORDER — MONTELUKAST SODIUM 10 MG/1
10 TABLET ORAL EVERY EVENING
Qty: 90 TABLET | Refills: 1 | Status: SHIPPED | OUTPATIENT
Start: 2022-06-07

## 2022-06-08 RX ORDER — AZELASTINE HYDROCHLORIDE 0.5 MG/ML
SOLUTION/ DROPS OPHTHALMIC
Qty: 3 EACH | Refills: 0 | Status: SHIPPED | OUTPATIENT
Start: 2022-06-08

## 2022-06-08 RX ORDER — LEVOCETIRIZINE DIHYDROCHLORIDE 5 MG/1
TABLET, FILM COATED ORAL
Qty: 90 TABLET | Refills: 0 | Status: SHIPPED | OUTPATIENT
Start: 2022-06-08

## 2022-06-09 DIAGNOSIS — R11.0 CHRONIC NAUSEA: ICD-10-CM

## 2022-06-16 ENCOUNTER — OFFICE VISIT (OUTPATIENT)
Dept: INTERNAL MEDICINE CLINIC | Facility: CLINIC | Age: 62
End: 2022-06-16
Payer: MEDICAID

## 2022-06-16 VITALS
BODY MASS INDEX: 21.56 KG/M2 | WEIGHT: 134.13 LBS | DIASTOLIC BLOOD PRESSURE: 52 MMHG | SYSTOLIC BLOOD PRESSURE: 93 MMHG | HEIGHT: 66 IN | HEART RATE: 80 BPM

## 2022-06-16 DIAGNOSIS — E11.65 UNCONTROLLED TYPE 2 DIABETES MELLITUS WITH HYPERGLYCEMIA, WITHOUT LONG-TERM CURRENT USE OF INSULIN (HCC): ICD-10-CM

## 2022-06-16 DIAGNOSIS — F17.200 SMOKER: ICD-10-CM

## 2022-06-16 DIAGNOSIS — J01.90 ACUTE NON-RECURRENT SINUSITIS, UNSPECIFIED LOCATION: Primary | ICD-10-CM

## 2022-06-16 DIAGNOSIS — F33.1 MODERATE RECURRENT MAJOR DEPRESSION (HCC): ICD-10-CM

## 2022-06-16 PROBLEM — F33.0 MILD EPISODE OF RECURRENT MAJOR DEPRESSIVE DISORDER: Status: ACTIVE | Noted: 2022-06-16

## 2022-06-16 PROBLEM — H57.13 PAIN OF BOTH EYES: Status: RESOLVED | Noted: 2022-03-15 | Resolved: 2022-06-16

## 2022-06-16 PROBLEM — F33.0 MILD EPISODE OF RECURRENT MAJOR DEPRESSIVE DISORDER (HCC): Status: ACTIVE | Noted: 2022-06-16

## 2022-06-16 PROCEDURE — 99214 OFFICE O/P EST MOD 30 MIN: CPT | Performed by: INTERNAL MEDICINE

## 2022-06-16 PROCEDURE — 3008F BODY MASS INDEX DOCD: CPT | Performed by: INTERNAL MEDICINE

## 2022-06-16 PROCEDURE — 3074F SYST BP LT 130 MM HG: CPT | Performed by: INTERNAL MEDICINE

## 2022-06-16 PROCEDURE — 3078F DIAST BP <80 MM HG: CPT | Performed by: INTERNAL MEDICINE

## 2022-06-16 RX ORDER — GABAPENTIN 800 MG/1
800 TABLET ORAL 3 TIMES DAILY
COMMUNITY
Start: 2022-06-06

## 2022-06-16 RX ORDER — AMOXICILLIN 875 MG/1
875 TABLET, COATED ORAL 2 TIMES DAILY
Qty: 20 TABLET | Refills: 0 | Status: SHIPPED | OUTPATIENT
Start: 2022-06-16 | End: 2022-06-26

## 2022-06-16 RX ORDER — SITAGLIPTIN 100 MG/1
100 TABLET, FILM COATED ORAL DAILY
COMMUNITY
Start: 2022-06-07

## 2022-06-16 NOTE — ASSESSMENT & PLAN NOTE
Patient thought she had an appointment with the endocrinologist, however she does not. She will schedule that. She has an appoint with the eye doctor. Continue present management. Follow-up with endocrinology.

## 2022-06-17 RX ORDER — PROCHLORPERAZINE MALEATE 5 MG/1
TABLET ORAL
Qty: 30 TABLET | Refills: 3 | Status: SHIPPED | OUTPATIENT
Start: 2022-06-17

## 2022-06-30 ENCOUNTER — TELEPHONE (OUTPATIENT)
Dept: INTERNAL MEDICINE CLINIC | Facility: CLINIC | Age: 62
End: 2022-06-30

## 2022-06-30 RX ORDER — AZITHROMYCIN 250 MG/1
TABLET, FILM COATED ORAL
Qty: 6 TABLET | Refills: 0 | Status: SHIPPED | OUTPATIENT
Start: 2022-06-30 | End: 2022-07-05

## 2022-06-30 NOTE — TELEPHONE ENCOUNTER
Patient contacts clinic reporting progression of symptoms. Sinus congestion, thick green mucous, swollen lymph node and ear pain, only on right side. Denies fever, body aches or chills. Finished abx as prescribed. Recent travel to [facility]. Inquires on another round of abx or other recommendations.

## 2022-07-08 RX ORDER — FLUOXETINE HYDROCHLORIDE 40 MG/1
CAPSULE ORAL
Qty: 90 CAPSULE | Refills: 1 | Status: SHIPPED | OUTPATIENT
Start: 2022-07-08

## 2022-07-08 RX ORDER — ERGOCALCIFEROL 1.25 MG/1
CAPSULE ORAL
Qty: 12 CAPSULE | Refills: 0 | Status: SHIPPED | OUTPATIENT
Start: 2022-07-08

## 2022-07-21 ENCOUNTER — OFFICE VISIT (OUTPATIENT)
Dept: ENDOCRINOLOGY CLINIC | Facility: CLINIC | Age: 62
End: 2022-07-21
Payer: MEDICAID

## 2022-07-21 VITALS
RESPIRATION RATE: 16 BRPM | HEART RATE: 76 BPM | WEIGHT: 128 LBS | SYSTOLIC BLOOD PRESSURE: 115 MMHG | DIASTOLIC BLOOD PRESSURE: 53 MMHG | BODY MASS INDEX: 20.57 KG/M2 | HEIGHT: 66 IN

## 2022-07-21 DIAGNOSIS — E78.00 HYPERCHOLESTEROLEMIA: ICD-10-CM

## 2022-07-21 DIAGNOSIS — R05.9 COUGH: ICD-10-CM

## 2022-07-21 DIAGNOSIS — E11.43 DIABETIC AUTONOMIC NEUROPATHY ASSOCIATED WITH TYPE 2 DIABETES MELLITUS (HCC): ICD-10-CM

## 2022-07-21 DIAGNOSIS — E11.69 TYPE 2 DIABETES MELLITUS WITH OTHER SPECIFIED COMPLICATION, WITHOUT LONG-TERM CURRENT USE OF INSULIN (HCC): Primary | ICD-10-CM

## 2022-07-21 LAB
CARTRIDGE LOT#: ABNORMAL NUMERIC
GLUCOSE BLOOD: 128
HEMOGLOBIN A1C: 7.32 % (ref 4.3–5.6)
TEST STRIP LOT #: NORMAL NUMERIC

## 2022-07-21 PROCEDURE — 3051F HG A1C>EQUAL 7.0%<8.0%: CPT | Performed by: INTERNAL MEDICINE

## 2022-07-21 PROCEDURE — 3074F SYST BP LT 130 MM HG: CPT | Performed by: INTERNAL MEDICINE

## 2022-07-21 PROCEDURE — 82947 ASSAY GLUCOSE BLOOD QUANT: CPT | Performed by: INTERNAL MEDICINE

## 2022-07-21 PROCEDURE — 3078F DIAST BP <80 MM HG: CPT | Performed by: INTERNAL MEDICINE

## 2022-07-21 PROCEDURE — 3008F BODY MASS INDEX DOCD: CPT | Performed by: INTERNAL MEDICINE

## 2022-07-21 PROCEDURE — 99214 OFFICE O/P EST MOD 30 MIN: CPT | Performed by: INTERNAL MEDICINE

## 2022-07-21 PROCEDURE — 83036 HEMOGLOBIN GLYCOSYLATED A1C: CPT | Performed by: INTERNAL MEDICINE

## 2022-07-21 RX ORDER — ALBUTEROL SULFATE 90 UG/1
AEROSOL, METERED RESPIRATORY (INHALATION)
Qty: 8.5 G | Refills: 2 | OUTPATIENT
Start: 2022-07-21

## 2022-07-22 PROBLEM — E11.43 DIABETIC AUTONOMIC NEUROPATHY ASSOCIATED WITH TYPE 2 DIABETES MELLITUS (HCC): Status: ACTIVE | Noted: 2022-07-22

## 2022-07-22 PROBLEM — E11.9 DIABETES MELLITUS (HCC): Status: ACTIVE | Noted: 2019-05-07

## 2022-08-04 ENCOUNTER — OFFICE VISIT (OUTPATIENT)
Dept: OPHTHALMOLOGY | Facility: CLINIC | Age: 62
End: 2022-08-04
Payer: MEDICAID

## 2022-08-04 DIAGNOSIS — H02.834 DERMATOCHALASIS OF BOTH UPPER EYELIDS: ICD-10-CM

## 2022-08-04 DIAGNOSIS — H25.13 AGE-RELATED NUCLEAR CATARACT OF BOTH EYES: ICD-10-CM

## 2022-08-04 DIAGNOSIS — G43.109 OPHTHALMIC MIGRAINE: ICD-10-CM

## 2022-08-04 DIAGNOSIS — H43.393 FLOATERS IN VISUAL FIELD, BILATERAL: ICD-10-CM

## 2022-08-04 DIAGNOSIS — H02.831 DERMATOCHALASIS OF BOTH UPPER EYELIDS: ICD-10-CM

## 2022-08-04 DIAGNOSIS — E11.9 DIABETES MELLITUS TYPE 2 WITHOUT RETINOPATHY (HCC): Primary | ICD-10-CM

## 2022-08-04 PROCEDURE — 2023F DILAT RTA XM W/O RTNOPTHY: CPT | Performed by: OPHTHALMOLOGY

## 2022-08-04 PROCEDURE — 92014 COMPRE OPH EXAM EST PT 1/>: CPT | Performed by: OPHTHALMOLOGY

## 2022-08-04 NOTE — ASSESSMENT & PLAN NOTE
Refer patient to Dr. Socorro Delatorre. Information given. Pt had bilateral blepharoplasty and wants a second opinion in 2017.

## 2022-08-04 NOTE — ASSESSMENT & PLAN NOTE
Discussed early cataracts with patient. Told patient that cataracts are age appropriate and they are not surgical at this time. No treatment recommended at this time. Suggest update +2.25 OTC reading glasses.

## 2022-08-04 NOTE — ASSESSMENT & PLAN NOTE
Discussed with patient that signs and symptoms sound like an ophthalmic migraine variant. There is no retinal pathology in either eye. Discussed that patient should follow up with their primary care physician if symptoms worsen or persist.    - no ocular pathology associated with visual symptoms  - triggers often include dehydration, fatigue, emotional stress, hormonal changes, bright lights, foods, smells  - no direct ophthalmic treatment indicated    - if frequency interferes with usual activities of daily living consider traditional anti-migraine medication  Ophthalmic migraine info given.

## 2022-08-04 NOTE — PATIENT INSTRUCTIONS
Age-related nuclear cataract of both eyes  Discussed early cataracts with patient. Told patient that cataracts are age appropriate and they are not surgical at this time. No treatment recommended at this time. Suggest update +2.25 OTC reading glasses. Diabetes mellitus type 2 without retinopathy ([address]  Diabetes type II: no background of retinopathy, no signs of neovascularization noted. Discussed ocular and systemic benefits of blood sugar control. Diagnosis and treatment discussed in detail with patient. Ophthalmic migraine  Discussed with patient that signs and symptoms sound like an ophthalmic migraine variant. There is no retinal pathology in either eye. Discussed that patient should follow up with their primary care physician if symptoms worsen or persist.    - no ocular pathology associated with visual symptoms  - triggers often include dehydration, fatigue, emotional stress, hormonal changes, bright lights, foods, smells  - no direct ophthalmic treatment indicated    - if frequency interferes with usual activities of daily living consider traditional anti-migraine medication  Ophthalmic migraine info given. Dermatochalasis of both upper eyelids  Refer patient to Dr. Laureano Teresa. Information given. Pt had bilateral blepharoplasty and wants a second opinion in 2017. Floaters in visual field, bilateral   There is no evidence of retinal pathology. All signs and symptoms of retinal detachment/tears explained in detail. Patient instructed to call the office if they experience increase in floaters, increase in flashes of light, loss of vision or curtain or veil effect.

## 2022-08-08 ENCOUNTER — TELEPHONE (OUTPATIENT)
Dept: ENDOCRINOLOGY CLINIC | Facility: CLINIC | Age: 62
End: 2022-08-08

## 2022-08-08 ENCOUNTER — LAB ENCOUNTER (OUTPATIENT)
Dept: LAB | Facility: HOSPITAL | Age: 62
End: 2022-08-08
Attending: INTERNAL MEDICINE
Payer: MEDICAID

## 2022-08-08 DIAGNOSIS — E03.9 ACQUIRED HYPOTHYROIDISM: ICD-10-CM

## 2022-08-08 DIAGNOSIS — E11.69 TYPE 2 DIABETES MELLITUS WITH OTHER SPECIFIED COMPLICATION, WITHOUT LONG-TERM CURRENT USE OF INSULIN (HCC): ICD-10-CM

## 2022-08-08 DIAGNOSIS — E78.00 HYPERCHOLESTEROLEMIA: ICD-10-CM

## 2022-08-08 DIAGNOSIS — E03.9 ACQUIRED HYPOTHYROIDISM: Primary | ICD-10-CM

## 2022-08-08 DIAGNOSIS — E11.65 TYPE 2 DIABETES MELLITUS WITH HYPERGLYCEMIA, WITHOUT LONG-TERM CURRENT USE OF INSULIN (HCC): ICD-10-CM

## 2022-08-08 LAB
ALBUMIN SERPL-MCNC: 3.9 G/DL (ref 3.4–5)
ALBUMIN/GLOB SERPL: 1.4 {RATIO} (ref 1–2)
ALP LIVER SERPL-CCNC: 54 U/L
ALT SERPL-CCNC: 23 U/L
ANION GAP SERPL CALC-SCNC: 6 MMOL/L (ref 0–18)
AST SERPL-CCNC: 13 U/L (ref 15–37)
BILIRUB SERPL-MCNC: 0.5 MG/DL (ref 0.1–2)
BUN BLD-MCNC: 16 MG/DL (ref 7–18)
BUN/CREAT SERPL: 18.6 (ref 10–20)
CALCIUM BLD-MCNC: 9.3 MG/DL (ref 8.5–10.1)
CHLORIDE SERPL-SCNC: 103 MMOL/L (ref 98–112)
CHOLEST SERPL-MCNC: 311 MG/DL (ref ?–200)
CO2 SERPL-SCNC: 30 MMOL/L (ref 21–32)
CREAT BLD-MCNC: 0.86 MG/DL
FASTING PATIENT LIPID ANSWER: YES
FASTING STATUS PATIENT QL REPORTED: YES
GFR SERPLBLD BASED ON 1.73 SQ M-ARVRAT: 75 ML/MIN/1.73M2 (ref 60–?)
GLOBULIN PLAS-MCNC: 2.8 G/DL (ref 2.8–4.4)
GLUCOSE BLD-MCNC: 145 MG/DL (ref 70–99)
HDLC SERPL-MCNC: 63 MG/DL (ref 40–59)
LDLC SERPL CALC-MCNC: 242 MG/DL (ref ?–100)
NONHDLC SERPL-MCNC: 248 MG/DL (ref ?–130)
OSMOLALITY SERPL CALC.SUM OF ELEC: 292 MOSM/KG (ref 275–295)
POTASSIUM SERPL-SCNC: 3.7 MMOL/L (ref 3.5–5.1)
PROT SERPL-MCNC: 6.7 G/DL (ref 6.4–8.2)
SODIUM SERPL-SCNC: 139 MMOL/L (ref 136–145)
T4 FREE SERPL-MCNC: 1.1 NG/DL (ref 0.8–1.7)
TRIGL SERPL-MCNC: 51 MG/DL (ref 30–149)
TSI SER-ACNC: 0.14 MIU/ML (ref 0.36–3.74)
VLDLC SERPL CALC-MCNC: 12 MG/DL (ref 0–30)

## 2022-08-08 PROCEDURE — 80053 COMPREHEN METABOLIC PANEL: CPT

## 2022-08-08 PROCEDURE — 84439 ASSAY OF FREE THYROXINE: CPT

## 2022-08-08 PROCEDURE — 36415 COLL VENOUS BLD VENIPUNCTURE: CPT

## 2022-08-08 PROCEDURE — 84443 ASSAY THYROID STIM HORMONE: CPT

## 2022-08-08 PROCEDURE — 80061 LIPID PANEL: CPT | Performed by: INTERNAL MEDICINE

## 2022-08-08 RX ORDER — DILTIAZEM HYDROCHLORIDE 60 MG/1
TABLET, FILM COATED ORAL
Qty: 30.6 G | Refills: 0 | Status: SHIPPED | OUTPATIENT
Start: 2022-08-08

## 2022-08-08 RX ORDER — LEVOTHYROXINE SODIUM 88 UG/1
88 TABLET ORAL
Qty: 90 TABLET | Refills: 0 | Status: SHIPPED | OUTPATIENT
Start: 2022-08-08 | End: 2022-11-06

## 2022-08-09 NOTE — TELEPHONE ENCOUNTER
Hi!  Please contact patient and let her know that I have reviewed all of her labs. Please ask her how much atorvastatin she has been taking and if she is still taking the ezetimibe. She has no evidence of liver and kidney disease. She still has evidence of being on too much levothyroxine. I would like to decrease the dose from 100mcg to 88mcg and recheck the TSH and FT4 in 3 months. Thank you!

## 2022-08-10 NOTE — TELEPHONE ENCOUNTER
Spoke with patient on telephone. Patient states she has not been taking Ezetimibe. Per patient she had taken maybe 3 days of the Atorvastatin the week prior to the labs, prior to that was not really taking with any regularity. Reviewed dosing instructions on Levothyroxine and repeat lab orders, patient verbalized understanding. Reviewed will follow up with Dr. Gamal Marc results re: cholesterol medication.

## 2022-08-11 RX ORDER — ATORVASTATIN CALCIUM 10 MG/1
10 TABLET, FILM COATED ORAL NIGHTLY
Qty: 90 TABLET | Refills: 0 | Status: SHIPPED | OUTPATIENT
Start: 2022-08-11 | End: 2022-11-09

## 2022-08-15 RX ORDER — SITAGLIPTIN 100 MG/1
TABLET, FILM COATED ORAL
Qty: 90 TABLET | Refills: 0 | Status: SHIPPED | OUTPATIENT
Start: 2022-08-15 | End: 2022-11-08

## 2022-08-15 RX ORDER — LEVOTHYROXINE SODIUM 0.1 MG/1
100 TABLET ORAL
Qty: 90 TABLET | Refills: 0 | OUTPATIENT
Start: 2022-08-15 | End: 2022-11-13

## 2022-08-15 NOTE — TELEPHONE ENCOUNTER
LOV 07/21/22. RTC 3 months. No f/u. Sent [employer] reminder to schedule for 10/2022. Pended 3 month supply of Januvia. Levothyroxine was decreased from 100mcg to 88mcg. Unable to remove rx for levothyroxine 100mcg.

## 2022-08-17 ENCOUNTER — TELEPHONE (OUTPATIENT)
Dept: INTERNAL MEDICINE CLINIC | Facility: CLINIC | Age: 62
End: 2022-08-17

## 2022-08-17 ENCOUNTER — NURSE TRIAGE (OUTPATIENT)
Dept: INTERNAL MEDICINE CLINIC | Facility: CLINIC | Age: 62
End: 2022-08-17

## 2022-08-17 DIAGNOSIS — L98.9 SKIN LESION: Primary | ICD-10-CM

## 2022-08-17 NOTE — TELEPHONE ENCOUNTER
Patient asking for a referral for a dermatologist. Patient states she has a small (less than size of pencil eraser) rough brownish spot on her chest. 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 Referral pended. Also FYI please other see triage encounter from today. Patient was advised to go to immediate care for puncture in finger from her cat's tooth.

## 2022-08-24 RX ORDER — METFORMIN HYDROCHLORIDE 500 MG/1
TABLET, EXTENDED RELEASE ORAL
Qty: 360 TABLET | Refills: 0 | OUTPATIENT
Start: 2022-08-24

## 2022-08-26 ENCOUNTER — TELEPHONE (OUTPATIENT)
Dept: ENDOCRINOLOGY CLINIC | Facility: CLINIC | Age: 62
End: 2022-08-26

## 2022-08-26 NOTE — TELEPHONE ENCOUNTER
Dr. Bustos Philo to patient who stated she is under severe stress due to recent life changes. Her diet is poor and some days does not eat due to schedule. She stated she doesn't check her blood sugars. She stated her blood sugar has been going from low to high but hasn't been checking. She stated Metformin was affecting her stomach long term that's why she was taken off of it. She stated she cannot check her blood sugar right now because she is in the car. I advised her to check her blood sugars this weekend and record them and give them to us on Monday. I advised patient that you will need to see blood sugars. She stated she is taking the Januvia 100 mg daily.

## 2022-08-26 NOTE — TELEPHONE ENCOUNTER
Pt called in to request to be back on metformin, pt states her blood sugar levels have been fluctuating.  Please follow up

## 2022-09-06 DIAGNOSIS — E11.65 TYPE 2 DIABETES MELLITUS WITH HYPERGLYCEMIA, WITHOUT LONG-TERM CURRENT USE OF INSULIN (HCC): ICD-10-CM

## 2022-09-06 DIAGNOSIS — E78.00 HYPERCHOLESTEROLEMIA: ICD-10-CM

## 2022-09-07 RX ORDER — ATORVASTATIN CALCIUM 20 MG/1
TABLET, FILM COATED ORAL
Qty: 90 TABLET | Refills: 0 | OUTPATIENT
Start: 2022-09-07

## 2022-09-07 RX ORDER — LEVOCETIRIZINE DIHYDROCHLORIDE 5 MG/1
TABLET, FILM COATED ORAL
Qty: 90 TABLET | Refills: 0 | Status: SHIPPED | OUTPATIENT
Start: 2022-09-07

## 2022-09-07 RX ORDER — AZELASTINE HYDROCHLORIDE 0.5 MG/ML
SOLUTION/ DROPS OPHTHALMIC
Qty: 18 ML | Refills: 0 | Status: SHIPPED | OUTPATIENT
Start: 2022-09-07

## 2022-09-29 ENCOUNTER — NURSE TRIAGE (OUTPATIENT)
Dept: INTERNAL MEDICINE CLINIC | Facility: CLINIC | Age: 62
End: 2022-09-29

## 2022-09-29 ENCOUNTER — TELEMEDICINE (OUTPATIENT)
Dept: INTERNAL MEDICINE CLINIC | Facility: CLINIC | Age: 62
End: 2022-09-29

## 2022-09-29 DIAGNOSIS — J44.1 COPD EXACERBATION (HCC): Primary | ICD-10-CM

## 2022-09-29 PROCEDURE — 99214 OFFICE O/P EST MOD 30 MIN: CPT | Performed by: INTERNAL MEDICINE

## 2022-09-29 RX ORDER — BENZONATATE 200 MG/1
200 CAPSULE ORAL 3 TIMES DAILY PRN
Qty: 40 CAPSULE | Refills: 1 | Status: SHIPPED | OUTPATIENT
Start: 2022-09-29

## 2022-09-29 RX ORDER — AZITHROMYCIN 250 MG/1
TABLET, FILM COATED ORAL
Qty: 6 TABLET | Refills: 0 | Status: SHIPPED | OUTPATIENT
Start: 2022-09-29 | End: 2022-10-04

## 2022-10-03 ENCOUNTER — APPOINTMENT (OUTPATIENT)
Dept: GENERAL RADIOLOGY | Facility: HOSPITAL | Age: 62
End: 2022-10-03
Payer: MEDICAID

## 2022-10-03 ENCOUNTER — HOSPITAL ENCOUNTER (EMERGENCY)
Facility: HOSPITAL | Age: 62
Discharge: HOME | End: 2022-10-03
Payer: MEDICAID

## 2022-10-03 ENCOUNTER — TELEPHONE (OUTPATIENT)
Dept: INTERNAL MEDICINE CLINIC | Facility: CLINIC | Age: 62
End: 2022-10-03

## 2022-10-03 VITALS
RESPIRATION RATE: 16 BRPM | WEIGHT: 132 LBS | TEMPERATURE: 97 F | BODY MASS INDEX: 21.21 KG/M2 | HEIGHT: 66 IN | OXYGEN SATURATION: 93 % | SYSTOLIC BLOOD PRESSURE: 131 MMHG | HEART RATE: 73 BPM | DIASTOLIC BLOOD PRESSURE: 66 MMHG

## 2022-10-03 DIAGNOSIS — J40 BRONCHITIS: Primary | ICD-10-CM

## 2022-10-03 LAB — SARS-COV-2 RNA RESP QL NAA+PROBE: NOT DETECTED

## 2022-10-03 PROCEDURE — 71046 X-RAY EXAM CHEST 2 VIEWS: CPT

## 2022-10-03 PROCEDURE — 94640 AIRWAY INHALATION TREATMENT: CPT

## 2022-10-03 PROCEDURE — 99284 EMERGENCY DEPT VISIT MOD MDM: CPT

## 2022-10-03 PROCEDURE — 93010 ELECTROCARDIOGRAM REPORT: CPT | Performed by: INTERNAL MEDICINE

## 2022-10-03 PROCEDURE — 93005 ELECTROCARDIOGRAM TRACING: CPT

## 2022-10-03 RX ORDER — IPRATROPIUM BROMIDE AND ALBUTEROL SULFATE 2.5; .5 MG/3ML; MG/3ML
3 SOLUTION RESPIRATORY (INHALATION) ONCE
Status: COMPLETED | OUTPATIENT
Start: 2022-10-03 | End: 2022-10-03

## 2022-10-03 RX ORDER — PREDNISONE 20 MG/1
40 TABLET ORAL DAILY
Qty: 10 TABLET | Refills: 0 | Status: SHIPPED | OUTPATIENT
Start: 2022-10-03 | End: 2022-10-08

## 2022-10-03 RX ORDER — PREDNISONE 20 MG/1
40 TABLET ORAL ONCE
Status: COMPLETED | OUTPATIENT
Start: 2022-10-03 | End: 2022-10-03

## 2022-10-03 NOTE — TELEPHONE ENCOUNTER
Patient states that she feels worse. She did not get her chest xray because she was too sick. She is having \"spasms\" in her lungs. She is more short of breath, weak and dizzy. I advised her to go the ER. She will go to [facility] ER. See office visit notes from 9/29/22. Routing to RN Triage for ER f/u.

## 2022-10-03 NOTE — ED INITIAL ASSESSMENT (HPI)
Pt to the ed with sob and cough for the past week. Pt states that she got back from [place] one week ago.

## 2022-10-03 NOTE — ED QUICK NOTES
Patient called PCP talked to RN. PCP prescribed z-pack Thursday. Patient reports worsening cough. Cold sweats periodically.

## 2022-10-05 RX ORDER — ERGOCALCIFEROL 1.25 MG/1
CAPSULE ORAL
Qty: 12 CAPSULE | Refills: 0 | Status: SHIPPED | OUTPATIENT
Start: 2022-10-05

## 2022-10-11 ENCOUNTER — NURSE TRIAGE (OUTPATIENT)
Dept: INTERNAL MEDICINE CLINIC | Facility: CLINIC | Age: 62
End: 2022-10-11

## 2022-10-11 NOTE — TELEPHONE ENCOUNTER
Patient returned call. Current sugar is 125. Patient states is feeling well. No signs of Hyperglycemia or hypoglycemia at this time. Patient again educated on risks of DKA. Patient completed prednisone dose yesterday. Patient was advised to continue monitoring sugar today and to write them down especially after meals and if having any symptoms of Hyper/Hypoglycemia to report to ER. Virtual visit scheduled for patient with Dr. Sandra Collins tomorrow to discuss sugars as unable to come in today. Patient verbalized understanding of all education provided to her as well as agreement to appointment scheduled. URIEL Collins.

## 2022-10-11 NOTE — TELEPHONE ENCOUNTER
PATIENT TO CALL BACK WITH CURRENT GLUCOSE READING. Can transfer to [phone] if Im available, Gonsalo Riggins can speak to any RN to triage further. Patient called and states she had elevated high glucose. 596 yesterday. Was feeling very tired. [place] horrible. Today she states she feels much better. She is speaking clearly. No vomiting now; feels cough spasms; Was in ER 10/3/22 was given prednisone. For bronchitis. Denied sob now. Yesterday felt worse. She did not check her glucose today. She is not home now. I initially advised ER because her glucose was very high yesterday. Patient states she feels much better today. We discussed risk of DKA. Patient states she took another half dose of Januvia. She will call back with current BS reading.

## 2022-10-12 ENCOUNTER — TELEMEDICINE (OUTPATIENT)
Dept: INTERNAL MEDICINE CLINIC | Facility: CLINIC | Age: 62
End: 2022-10-12
Payer: MEDICAID

## 2022-10-12 DIAGNOSIS — J44.1 COPD EXACERBATION (HCC): Primary | ICD-10-CM

## 2022-10-12 DIAGNOSIS — E11.9 DIABETES MELLITUS TYPE 2 WITHOUT RETINOPATHY (HCC): ICD-10-CM

## 2022-10-12 PROCEDURE — 99214 OFFICE O/P EST MOD 30 MIN: CPT | Performed by: INTERNAL MEDICINE

## 2022-10-12 NOTE — ASSESSMENT & PLAN NOTE
Reviewed ER notes and imaging reports. Advised patient to use the albuterol every 4 hours as needed for the cough. We will hold off on additional antibiotics, since she says she does not react well to them and the Z-Mat did not help. Advised patient if her coughing is not improved she may need more steroids. Patient would like to avoid this since her blood sugar went up while taking steroids. Patient to go back to the ER if she develops shortness of breath.

## 2022-10-12 NOTE — ASSESSMENT & PLAN NOTE
Patient's blood sugar went up to 600 on the prednisone. She is currently off of it and her sugar has come down. She took additional glimepiride when her sugar was high. Advised patient to stop this and continue to monitor her sugars twice a day.

## 2022-11-02 RX ORDER — FLUTICASONE PROPIONATE 50 MCG
2 SPRAY, SUSPENSION (ML) NASAL DAILY
Qty: 48 ML | Refills: 2 | Status: SHIPPED | OUTPATIENT
Start: 2022-11-02

## 2022-11-08 RX ORDER — SITAGLIPTIN 100 MG/1
TABLET, FILM COATED ORAL
Qty: 90 TABLET | Refills: 0 | Status: SHIPPED | OUTPATIENT
Start: 2022-11-08

## 2022-11-08 NOTE — TELEPHONE ENCOUNTER
Chief Complaint:  Chief Complaint   Patient presents with    Fall     transfer from [facility] left hip frx. Closed left hip fracture, initial encounter ([address]     Subjective:    Patient is awake and interactive. Reports only mild pain L hip. No other complaints. He looks very Parkinsonian to me and I informed him and his daughter of this. His daughter explains he was \"tested for\" Parkinson's and reportedly was negative at [facility] (I didn't see records in [facility], though I do see a Neuro visit in 2002 with no note available)    Objective:    BP (!) 152/71   Pulse 75   Temp 99 °F (37.2 °C) (Axillary)   Resp 12   Ht 5' 9\" (1.753 m)   Wt 145 lb 1 oz (65.8 kg)   SpO2 96%   BMI 21.42 kg/m²     Current medications that patient is taking have been reviewed. General appearance: NAD, conversant, very frail appearing  HEENT: AT/NC, MMM  Neck: FROM, supple  Lungs: Clear to auscultation, WOB normal  CV: RRR, no MRGs  Abdomen: Soft, non-tender; no masses or HSM, +BS  Extremities: No peripheral edema or digital cyanosis  Skin: no rash, lesions or ulcers  Psych: Calm and cooperative  Neuro: Alert and interactive, face symmetric, moving all extremities, speech fluent. Resting jaw tremor, masked facies, and resting bilateral hand tremor.     Labs:  CBC with Differential:    Lab Results   Component Value Date    WBC 4.6 10/18/2021    RBC 2.55 10/18/2021    HGB 7.7 10/18/2021    HCT 24.6 10/18/2021     10/18/2021    MCV 96.5 10/18/2021    MCH 30.2 10/18/2021    MCHC 31.3 10/18/2021    RDW 15.6 10/18/2021    LYMPHOPCT 16.3 10/18/2021    MONOPCT 10.0 10/18/2021    BASOPCT 0.4 10/18/2021    MONOSABS 0.46 10/18/2021    LYMPHSABS 0.75 10/18/2021    EOSABS 0.18 10/18/2021    BASOSABS 0.02 10/18/2021     CMP:    Lab Results   Component Value Date     10/18/2021    K 3.6 10/18/2021    K 4.8 10/15/2021     10/18/2021    CO2 18 10/18/2021    BUN 24 10/18/2021    CREATININE 1.1 10/18/2021    GFRAA >60 LOV: 7/21/2022    RTC: 3 months     F/U: 12/3/2022    Dr Kaylin Ventura-- orders pending, approve if appropriate 10/18/2021    LABGLOM >60 10/18/2021    GLUCOSE 97 10/18/2021    PROT 7.9 10/13/2021    LABALBU 4.1 10/13/2021    CALCIUM 7.8 10/18/2021    BILITOT 0.4 10/13/2021    ALKPHOS 126 10/13/2021    AST 27 10/13/2021    ALT 13 10/13/2021        Assessment/Plan:  Principal Problem:    Closed left hip fracture, initial encounter ([facility] [address]  Active Problems:    Pneumonia    CAD (coronary artery disease)    H/O: CVA (cerebrovascular accident)    Essential hypertension    Dementia (HCC)    Thrombocytopenia (HCC)    Anemia    Postoperative anemia due to acute blood loss    MARIETTA (acute kidney injury) ([address]  Resolved Problems:    * No resolved hospital problems. *       Unable to swallow safely after surgery    He looks parkinsonian to me but family refuses further workup/treatment for this, yet they want him to swallow safely. Will request repeat MBS but in his current state without treatment of underlying condition I'm not sure he'll improve. I requested neuro consult and explained this to his daughter. After I left the room, his RN called me to state that the patient's daughter decided to refuse neuro consult? ??  I will talk to her again tomorrow about it.     MARIETTA resolved    Switch fluids to D5 1/2 NS for some small amount of nutrition    Continue antibiotics    Requires continued inpatient level of care       Michelle Crisostomo MD    8:38 PM  10/18/2021

## 2022-11-14 RX ORDER — DILTIAZEM HYDROCHLORIDE 60 MG/1
TABLET, FILM COATED ORAL
Qty: 1 EACH | Refills: 0 | Status: SHIPPED | OUTPATIENT
Start: 2022-11-14

## 2022-11-14 NOTE — TELEPHONE ENCOUNTER
Received refill request for Symbicort inhaler 80-4.5 mcg- 2 puffs 2 times a day. Patient was last seen for an office visit 11/29/21 and is scheduled for a follow up on 11/28/22. Will refill for 1 month per protocol.

## 2022-11-22 DIAGNOSIS — E03.9 ACQUIRED HYPOTHYROIDISM: ICD-10-CM

## 2022-11-23 NOTE — TELEPHONE ENCOUNTER
LOV: 7/21/2022    RTC: 3 months    F/U: 12/3/2022    Dr Yessenia Guerrero-- orders pending, approve if appropriate

## 2022-11-25 RX ORDER — LEVOTHYROXINE SODIUM 88 UG/1
TABLET ORAL
Qty: 90 TABLET | Refills: 1 | Status: SHIPPED | OUTPATIENT
Start: 2022-11-25

## 2022-12-01 NOTE — TELEPHONE ENCOUNTER
RN called pt to notify her that she will need follow up scheduled for refill. Pt last seen on 11/29/2021. RN left message notifying her. Provided call back number to schedule.

## 2022-12-03 ENCOUNTER — OFFICE VISIT (OUTPATIENT)
Dept: ENDOCRINOLOGY CLINIC | Facility: CLINIC | Age: 62
End: 2022-12-03
Payer: MEDICAID

## 2022-12-03 VITALS
BODY MASS INDEX: 21 KG/M2 | HEART RATE: 74 BPM | SYSTOLIC BLOOD PRESSURE: 106 MMHG | DIASTOLIC BLOOD PRESSURE: 57 MMHG | WEIGHT: 128 LBS

## 2022-12-03 DIAGNOSIS — E11.69 TYPE 2 DIABETES MELLITUS WITH OTHER SPECIFIED COMPLICATION, WITHOUT LONG-TERM CURRENT USE OF INSULIN (HCC): ICD-10-CM

## 2022-12-03 DIAGNOSIS — E55.9 VITAMIN D DEFICIENCY: ICD-10-CM

## 2022-12-03 DIAGNOSIS — E03.9 ACQUIRED HYPOTHYROIDISM: ICD-10-CM

## 2022-12-03 DIAGNOSIS — E11.65 TYPE 2 DIABETES MELLITUS WITH HYPERGLYCEMIA, WITHOUT LONG-TERM CURRENT USE OF INSULIN (HCC): Primary | ICD-10-CM

## 2022-12-03 DIAGNOSIS — E78.00 HYPERCHOLESTEROLEMIA: ICD-10-CM

## 2022-12-03 LAB
CARTRIDGE LOT#: NORMAL NUMERIC
GLUCOSE BLOOD: 184
TEST STRIP LOT #: NORMAL NUMERIC

## 2022-12-03 PROCEDURE — 3078F DIAST BP <80 MM HG: CPT | Performed by: INTERNAL MEDICINE

## 2022-12-03 PROCEDURE — 83036 HEMOGLOBIN GLYCOSYLATED A1C: CPT | Performed by: INTERNAL MEDICINE

## 2022-12-03 PROCEDURE — 3074F SYST BP LT 130 MM HG: CPT | Performed by: INTERNAL MEDICINE

## 2022-12-03 PROCEDURE — 82947 ASSAY GLUCOSE BLOOD QUANT: CPT | Performed by: INTERNAL MEDICINE

## 2022-12-03 PROCEDURE — 99214 OFFICE O/P EST MOD 30 MIN: CPT | Performed by: INTERNAL MEDICINE

## 2022-12-03 PROCEDURE — 3044F HG A1C LEVEL LT 7.0%: CPT | Performed by: INTERNAL MEDICINE

## 2022-12-06 DIAGNOSIS — E11.69 TYPE 2 DIABETES MELLITUS WITH OTHER SPECIFIED COMPLICATION, WITHOUT LONG-TERM CURRENT USE OF INSULIN (HCC): ICD-10-CM

## 2022-12-06 DIAGNOSIS — R11.0 CHRONIC NAUSEA: ICD-10-CM

## 2022-12-06 DIAGNOSIS — E78.00 HYPERCHOLESTEROLEMIA: ICD-10-CM

## 2022-12-06 RX ORDER — MONTELUKAST SODIUM 10 MG/1
TABLET ORAL
Qty: 90 TABLET | Refills: 0 | Status: SHIPPED | OUTPATIENT
Start: 2022-12-06

## 2022-12-06 RX ORDER — LEVOCETIRIZINE DIHYDROCHLORIDE 5 MG/1
TABLET, FILM COATED ORAL
Qty: 90 TABLET | Refills: 0 | Status: SHIPPED | OUTPATIENT
Start: 2022-12-06

## 2022-12-06 RX ORDER — AZELASTINE HYDROCHLORIDE 0.5 MG/ML
SOLUTION/ DROPS OPHTHALMIC
Qty: 18 ML | Refills: 0 | Status: SHIPPED | OUTPATIENT
Start: 2022-12-06

## 2022-12-06 RX ORDER — PROCHLORPERAZINE MALEATE 5 MG/1
TABLET ORAL
Qty: 30 TABLET | Refills: 0 | Status: SHIPPED | OUTPATIENT
Start: 2022-12-06

## 2022-12-06 NOTE — TELEPHONE ENCOUNTER
ekg 10/3/22 stable from comparison. Last seen 4/2021 and needs follow-up. Rx refilled for #30 no refills. Please let her know to schedule to avoid delay in future refills.     Thanks,  [place]

## 2022-12-07 RX ORDER — ATORVASTATIN CALCIUM 10 MG/1
10 TABLET, FILM COATED ORAL EVERY OTHER DAY
Qty: 90 TABLET | Refills: 1 | Status: SHIPPED | OUTPATIENT
Start: 2022-12-07

## 2022-12-07 NOTE — TELEPHONE ENCOUNTER
LOV: 12/03/22  Per LOV note, it was recommended to take statin every other day due to leg cramps.     RTC 3 months

## 2022-12-09 ENCOUNTER — LAB ENCOUNTER (OUTPATIENT)
Dept: LAB | Facility: HOSPITAL | Age: 62
End: 2022-12-09
Attending: INTERNAL MEDICINE
Payer: MEDICAID

## 2022-12-09 DIAGNOSIS — E55.9 VITAMIN D DEFICIENCY: ICD-10-CM

## 2022-12-09 DIAGNOSIS — E03.9 ACQUIRED HYPOTHYROIDISM: ICD-10-CM

## 2022-12-09 LAB
T4 FREE SERPL-MCNC: 0.9 NG/DL (ref 0.8–1.7)
TSI SER-ACNC: 12.9 MIU/ML (ref 0.36–3.74)
VIT D+METAB SERPL-MCNC: 26.8 NG/ML (ref 30–100)

## 2022-12-09 PROCEDURE — 84443 ASSAY THYROID STIM HORMONE: CPT

## 2022-12-09 PROCEDURE — 84439 ASSAY OF FREE THYROXINE: CPT

## 2022-12-09 PROCEDURE — 82306 VITAMIN D 25 HYDROXY: CPT

## 2022-12-09 PROCEDURE — 36415 COLL VENOUS BLD VENIPUNCTURE: CPT

## 2022-12-12 RX ORDER — DILTIAZEM HYDROCHLORIDE 60 MG/1
TABLET, FILM COATED ORAL
Qty: 10.2 G | Refills: 2 | Status: SHIPPED | OUTPATIENT
Start: 2022-12-12

## 2022-12-13 PROBLEM — E55.9 VITAMIN D DEFICIENCY: Status: ACTIVE | Noted: 2022-12-13

## 2022-12-15 ENCOUNTER — TELEPHONE (OUTPATIENT)
Dept: ENDOCRINOLOGY CLINIC | Facility: CLINIC | Age: 62
End: 2022-12-15

## 2022-12-15 DIAGNOSIS — E55.9 VITAMIN D DEFICIENCY: Primary | ICD-10-CM

## 2022-12-15 DIAGNOSIS — E03.9 ACQUIRED HYPOTHYROIDISM: ICD-10-CM

## 2022-12-15 RX ORDER — LEVOTHYROXINE SODIUM 0.1 MG/1
100 TABLET ORAL
Qty: 90 TABLET | Refills: 0 | Status: SHIPPED | OUTPATIENT
Start: 2022-12-15

## 2022-12-15 NOTE — TELEPHONE ENCOUNTER
Hi!  Please contact patient and let her know that I would like her to start on vitamin D 2,000 international units daily. I would also like to increase her levothyroxine to 100mcg. I would like to check TSH, FT4, Vitamin D in 3 months. Thank you!
Spoke to patient to relay message below - patient stated understanding to start 2000 units vitamin D daily and increase levothyroxine to 100mcg daily - patient c/o hot flashes and fatigue  Patient stated understanding to repeat fasting labs in 3 months  RX for 100mcg levothyroxine sent to pharmacy
30

## 2023-01-05 DIAGNOSIS — R11.0 CHRONIC NAUSEA: ICD-10-CM

## 2023-01-09 RX ORDER — PROCHLORPERAZINE MALEATE 5 MG/1
TABLET ORAL
Qty: 30 TABLET | Refills: 2 | Status: SHIPPED | OUTPATIENT
Start: 2023-01-09

## 2023-01-30 ENCOUNTER — TELEMEDICINE (OUTPATIENT)
Dept: ALLERGY | Facility: CLINIC | Age: 63
End: 2023-01-30
Payer: MEDICAID

## 2023-01-30 DIAGNOSIS — Z87.891 FORMER SMOKER: ICD-10-CM

## 2023-01-30 DIAGNOSIS — Z23 FLU VACCINE NEED: ICD-10-CM

## 2023-01-30 DIAGNOSIS — J30.89 PERENNIAL ALLERGIC RHINITIS WITH SEASONAL VARIATION: ICD-10-CM

## 2023-01-30 DIAGNOSIS — Z87.09 HISTORY OF COPD: Primary | ICD-10-CM

## 2023-01-30 DIAGNOSIS — Z92.29 COVID-19 VACCINE SERIES COMPLETED: ICD-10-CM

## 2023-01-30 DIAGNOSIS — J30.2 PERENNIAL ALLERGIC RHINITIS WITH SEASONAL VARIATION: ICD-10-CM

## 2023-01-30 PROCEDURE — 99214 OFFICE O/P EST MOD 30 MIN: CPT | Performed by: ALLERGY & IMMUNOLOGY

## 2023-01-30 RX ORDER — PREDNISONE 20 MG/1
TABLET ORAL
Qty: 10 TABLET | Refills: 0 | Status: SHIPPED | OUTPATIENT
Start: 2023-01-30

## 2023-01-30 RX ORDER — DOXYCYCLINE HYCLATE 100 MG/1
100 CAPSULE ORAL 2 TIMES DAILY
Qty: 20 CAPSULE | Refills: 0 | Status: SHIPPED | OUTPATIENT
Start: 2023-01-30 | End: 2023-02-09

## 2023-02-01 NOTE — TELEPHONE ENCOUNTER
Pt is returning the call.  Rn not available pt is not sure if she needs to be seen right away please call

## 2023-02-01 NOTE — TELEPHONE ENCOUNTER
LOV:12/03/22    RTC: 3 Months     FU: No FU Appt Scheduled      Last Refill: 11/08/22      3 Month Supply Pending     Called pt to schedule appt, no answer  LMTCB

## 2023-02-02 RX ORDER — SITAGLIPTIN 100 MG/1
TABLET, FILM COATED ORAL
Qty: 90 TABLET | Refills: 0 | Status: SHIPPED | OUTPATIENT
Start: 2023-02-02

## 2023-02-27 RX ORDER — LEVOCETIRIZINE DIHYDROCHLORIDE 5 MG/1
TABLET, FILM COATED ORAL
Qty: 90 TABLET | Refills: 0 | Status: SHIPPED | OUTPATIENT
Start: 2023-02-27

## 2023-02-27 RX ORDER — AZELASTINE HYDROCHLORIDE 0.5 MG/ML
SOLUTION/ DROPS OPHTHALMIC
Qty: 18 ML | Refills: 0 | Status: SHIPPED | OUTPATIENT
Start: 2023-02-27

## 2023-03-06 RX ORDER — DILTIAZEM HYDROCHLORIDE 60 MG/1
TABLET, FILM COATED ORAL
Qty: 10.2 G | Refills: 2 | Status: SHIPPED | OUTPATIENT
Start: 2023-03-06

## 2023-03-06 RX ORDER — MONTELUKAST SODIUM 10 MG/1
TABLET ORAL
Qty: 90 TABLET | Refills: 0 | Status: SHIPPED | OUTPATIENT
Start: 2023-03-06

## 2023-03-07 RX ORDER — BLOOD SUGAR DIAGNOSTIC
STRIP MISCELLANEOUS
Qty: 300 STRIP | Refills: 11 | Status: SHIPPED | OUTPATIENT
Start: 2023-03-07

## 2023-03-07 RX ORDER — LANCETS 33 GAUGE
1 EACH MISCELLANEOUS 3 TIMES DAILY
Qty: 300 EACH | Refills: 3 | Status: SHIPPED | OUTPATIENT
Start: 2023-03-07

## 2023-03-08 ENCOUNTER — NURSE TRIAGE (OUTPATIENT)
Dept: INTERNAL MEDICINE CLINIC | Facility: CLINIC | Age: 63
End: 2023-03-08

## 2023-03-09 NOTE — TELEPHONE ENCOUNTER
Spoke to patient (name and  of patient verified). She reports she is feeling a lot better today and does not need the video visit later today. She states she will go to urgent care if her symptoms worsen. Advised patient to call office with questions or to schedule video visit if needed, verbalizes understanding.

## 2023-03-29 NOTE — TELEPHONE ENCOUNTER
Noted. Zygomaticofacial Flap Text: Given the location of the defect, shape of the defect and the proximity to free margins a zygomaticofacial flap was deemed most appropriate for repair.  Using a sterile surgical marker, the appropriate flap was drawn incorporating the defect and placing the expected incisions within the relaxed skin tension lines where possible. The area thus outlined was incised deep to adipose tissue with a #15 scalpel blade with preservation of a vascular pedicle.  The skin margins were undermined to an appropriate distance in all directions utilizing iris scissors.  The flap was then placed into the defect and anchored with interrupted buried subcutaneous sutures.

## 2023-04-03 DIAGNOSIS — R11.0 CHRONIC NAUSEA: ICD-10-CM

## 2023-04-03 RX ORDER — SITAGLIPTIN 100 MG/1
TABLET, FILM COATED ORAL
Qty: 90 TABLET | Refills: 0 | Status: SHIPPED | OUTPATIENT
Start: 2023-04-03

## 2023-04-03 NOTE — TELEPHONE ENCOUNTER
LOV: 12/3/22    RTC:  3 Months    FU: No FU Appt Scheduled    Last Refill: 2/2/23    3 Month Supply Pending       Called to help schedule a f/u appt, no answer, lmtcb

## 2023-04-16 RX ORDER — PROCHLORPERAZINE MALEATE 5 MG/1
TABLET ORAL
Qty: 30 TABLET | Refills: 2 | Status: SHIPPED | OUTPATIENT
Start: 2023-04-16

## 2023-04-24 ENCOUNTER — TELEPHONE (OUTPATIENT)
Dept: ENDOCRINOLOGY CLINIC | Facility: CLINIC | Age: 63
End: 2023-04-24

## 2023-04-24 DIAGNOSIS — E11.69 TYPE 2 DIABETES MELLITUS WITH OTHER SPECIFIED COMPLICATION, WITHOUT LONG-TERM CURRENT USE OF INSULIN (HCC): Primary | ICD-10-CM

## 2023-04-24 NOTE — TELEPHONE ENCOUNTER
Per message below patient is asking for renal function test due to hx of renal stone. There is an existing CMP order in the system already. Will route to provider as FYI in case there are other labs (UA needed?)  that needs to be done.

## 2023-04-24 NOTE — TELEPHONE ENCOUNTER
I called pt to inform her about the [facility] back order. I advised pt to get A1c done in the lab before her appointment. Per pt states that she will go to the lab and get it done asap. Per pt is also requesting to get a kidney function test done due to history of kidney stones.

## 2023-04-27 ENCOUNTER — LAB ENCOUNTER (OUTPATIENT)
Dept: LAB | Facility: HOSPITAL | Age: 63
End: 2023-04-27
Attending: INTERNAL MEDICINE
Payer: MEDICAID

## 2023-04-27 DIAGNOSIS — E55.9 VITAMIN D DEFICIENCY: ICD-10-CM

## 2023-04-27 DIAGNOSIS — N20.0 NEPHROLITHIASIS: ICD-10-CM

## 2023-04-27 DIAGNOSIS — E03.9 ACQUIRED HYPOTHYROIDISM: ICD-10-CM

## 2023-04-27 DIAGNOSIS — E11.65 TYPE 2 DIABETES MELLITUS WITH HYPERGLYCEMIA, WITHOUT LONG-TERM CURRENT USE OF INSULIN (HCC): ICD-10-CM

## 2023-04-27 DIAGNOSIS — E11.69 TYPE 2 DIABETES MELLITUS WITH OTHER SPECIFIED COMPLICATION, WITHOUT LONG-TERM CURRENT USE OF INSULIN (HCC): ICD-10-CM

## 2023-04-27 DIAGNOSIS — E78.00 HYPERCHOLESTEROLEMIA: ICD-10-CM

## 2023-04-27 LAB
ALBUMIN SERPL-MCNC: 4 G/DL (ref 3.4–5)
ALBUMIN/GLOB SERPL: 1.2 {RATIO} (ref 1–2)
ALP LIVER SERPL-CCNC: 54 U/L
ALT SERPL-CCNC: 32 U/L
ANION GAP SERPL CALC-SCNC: 4 MMOL/L (ref 0–18)
AST SERPL-CCNC: 21 U/L (ref 15–37)
BILIRUB SERPL-MCNC: 0.3 MG/DL (ref 0.1–2)
BUN BLD-MCNC: 15 MG/DL (ref 7–18)
BUN/CREAT SERPL: 17.6 (ref 10–20)
CALCIUM BLD-MCNC: 9.3 MG/DL (ref 8.5–10.1)
CHLORIDE SERPL-SCNC: 106 MMOL/L (ref 98–112)
CHOLEST SERPL-MCNC: 348 MG/DL (ref ?–200)
CO2 SERPL-SCNC: 30 MMOL/L (ref 21–32)
CREAT BLD-MCNC: 0.85 MG/DL
CREAT UR-SCNC: 87.6 MG/DL
EST. AVERAGE GLUCOSE BLD GHB EST-MCNC: 166 MG/DL (ref 68–126)
FASTING PATIENT LIPID ANSWER: NO
FASTING STATUS PATIENT QL REPORTED: NO
GFR SERPLBLD BASED ON 1.73 SQ M-ARVRAT: 76 ML/MIN/1.73M2 (ref 60–?)
GLOBULIN PLAS-MCNC: 3.3 G/DL (ref 2.8–4.4)
GLUCOSE BLD-MCNC: 157 MG/DL (ref 70–99)
HBA1C MFR BLD: 7.4 % (ref ?–5.7)
HDLC SERPL-MCNC: 84 MG/DL (ref 40–59)
LDLC SERPL CALC-MCNC: 256 MG/DL (ref ?–100)
MICROALBUMIN UR-MCNC: 0.55 MG/DL
MICROALBUMIN/CREAT 24H UR-RTO: 6.3 UG/MG (ref ?–30)
NONHDLC SERPL-MCNC: 264 MG/DL (ref ?–130)
OSMOLALITY SERPL CALC.SUM OF ELEC: 294 MOSM/KG (ref 275–295)
POTASSIUM SERPL-SCNC: 4.1 MMOL/L (ref 3.5–5.1)
PROT SERPL-MCNC: 7.3 G/DL (ref 6.4–8.2)
PTH-INTACT SERPL-MCNC: 45.5 PG/ML (ref 18.5–88)
SODIUM SERPL-SCNC: 140 MMOL/L (ref 136–145)
T4 FREE SERPL-MCNC: 1 NG/DL (ref 0.8–1.7)
TRIGL SERPL-MCNC: 65 MG/DL (ref 30–149)
TSI SER-ACNC: 4.99 MIU/ML (ref 0.36–3.74)
VIT D+METAB SERPL-MCNC: 24.3 NG/ML (ref 30–100)
VLDLC SERPL CALC-MCNC: 15 MG/DL (ref 0–30)

## 2023-04-27 PROCEDURE — 3061F NEG MICROALBUMINURIA REV: CPT | Performed by: INTERNAL MEDICINE

## 2023-04-27 PROCEDURE — 80061 LIPID PANEL: CPT

## 2023-04-27 PROCEDURE — 84443 ASSAY THYROID STIM HORMONE: CPT

## 2023-04-27 PROCEDURE — 82306 VITAMIN D 25 HYDROXY: CPT

## 2023-04-27 PROCEDURE — 84439 ASSAY OF FREE THYROXINE: CPT

## 2023-04-27 PROCEDURE — 82043 UR ALBUMIN QUANTITATIVE: CPT

## 2023-04-27 PROCEDURE — 3051F HG A1C>EQUAL 7.0%<8.0%: CPT | Performed by: INTERNAL MEDICINE

## 2023-04-27 PROCEDURE — 83036 HEMOGLOBIN GLYCOSYLATED A1C: CPT

## 2023-04-27 PROCEDURE — 83970 ASSAY OF PARATHORMONE: CPT

## 2023-04-27 PROCEDURE — 36415 COLL VENOUS BLD VENIPUNCTURE: CPT

## 2023-04-27 PROCEDURE — 80053 COMPREHEN METABOLIC PANEL: CPT

## 2023-04-27 PROCEDURE — 82570 ASSAY OF URINE CREATININE: CPT

## 2023-05-01 ENCOUNTER — OFFICE VISIT (OUTPATIENT)
Dept: ENDOCRINOLOGY CLINIC | Facility: CLINIC | Age: 63
End: 2023-05-01

## 2023-05-01 ENCOUNTER — OFFICE VISIT (OUTPATIENT)
Facility: CLINIC | Age: 63
End: 2023-05-01

## 2023-05-01 VITALS
DIASTOLIC BLOOD PRESSURE: 52 MMHG | RESPIRATION RATE: 16 BRPM | BODY MASS INDEX: 20.25 KG/M2 | HEART RATE: 82 BPM | WEIGHT: 126 LBS | TEMPERATURE: 98 F | OXYGEN SATURATION: 98 % | SYSTOLIC BLOOD PRESSURE: 100 MMHG | HEIGHT: 66 IN

## 2023-05-01 VITALS
DIASTOLIC BLOOD PRESSURE: 51 MMHG | WEIGHT: 128.38 LBS | BODY MASS INDEX: 20.63 KG/M2 | SYSTOLIC BLOOD PRESSURE: 115 MMHG | HEIGHT: 66 IN | HEART RATE: 75 BPM

## 2023-05-01 DIAGNOSIS — Z12.83 SCREENING FOR SKIN CANCER: ICD-10-CM

## 2023-05-01 DIAGNOSIS — G89.29 CHRONIC RIGHT-SIDED LOW BACK PAIN WITH RIGHT-SIDED SCIATICA: ICD-10-CM

## 2023-05-01 DIAGNOSIS — E03.9 ACQUIRED HYPOTHYROIDISM: ICD-10-CM

## 2023-05-01 DIAGNOSIS — Z12.31 BREAST CANCER SCREENING BY MAMMOGRAM: ICD-10-CM

## 2023-05-01 DIAGNOSIS — E78.00 HYPERCHOLESTEROLEMIA: ICD-10-CM

## 2023-05-01 DIAGNOSIS — E11.65 TYPE 2 DIABETES MELLITUS WITH HYPERGLYCEMIA, WITHOUT LONG-TERM CURRENT USE OF INSULIN (HCC): ICD-10-CM

## 2023-05-01 DIAGNOSIS — E11.69 TYPE 2 DIABETES MELLITUS WITH OTHER SPECIFIED COMPLICATION, WITHOUT LONG-TERM CURRENT USE OF INSULIN (HCC): Primary | ICD-10-CM

## 2023-05-01 DIAGNOSIS — M54.41 CHRONIC RIGHT-SIDED LOW BACK PAIN WITH RIGHT-SIDED SCIATICA: ICD-10-CM

## 2023-05-01 DIAGNOSIS — R07.89 CHEST PRESSURE: Primary | ICD-10-CM

## 2023-05-01 LAB
GLUCOSE BLOOD: 216
TEST STRIP LOT #: NORMAL NUMERIC

## 2023-05-01 PROCEDURE — 3078F DIAST BP <80 MM HG: CPT | Performed by: INTERNAL MEDICINE

## 2023-05-01 PROCEDURE — 3074F SYST BP LT 130 MM HG: CPT | Performed by: INTERNAL MEDICINE

## 2023-05-01 PROCEDURE — 3008F BODY MASS INDEX DOCD: CPT | Performed by: INTERNAL MEDICINE

## 2023-05-01 PROCEDURE — 99214 OFFICE O/P EST MOD 30 MIN: CPT | Performed by: INTERNAL MEDICINE

## 2023-05-01 PROCEDURE — 82947 ASSAY GLUCOSE BLOOD QUANT: CPT | Performed by: INTERNAL MEDICINE

## 2023-05-04 RX ORDER — ERGOCALCIFEROL 1.25 MG/1
50000 CAPSULE ORAL WEEKLY
Qty: 12 CAPSULE | Refills: 1 | Status: SHIPPED | OUTPATIENT
Start: 2023-05-04

## 2023-05-04 NOTE — TELEPHONE ENCOUNTER
Please review;  No Protocol    Component  Ref Range & Units 4/27/23  6:09 PM   Vitamin D, 25OH, Total  30.0 - 100.0 ng/mL 24.3 Low       Rx Pended, authorize if appropriate    Requested Prescriptions   Pending Prescriptions Disp Refills    ERGOCALCIFEROL 1.25 MG (84084 UT) Oral Cap [Pharmacy Med Name: VITAMIN D 1.25MG CAPSULE] 12 capsule 1     Sig: TAKE ONE CAPSULE BY MOUTH ONCE A WEEK       There is no refill protocol information for this order          Recent Outpatient Visits              3 days ago Chest pressure    [facility], Tylor Shaw MD    Office Visit    3 days ago Type 2 diabetes mellitus with other specified complication, without long-term current use of insulin [facility])    [facility], [address] [place] [address] Jessa Barton MD    Office Visit    3 months ago History of COPD    [facility], [address] [place] [address], Sarahann Barthel, MD    Telemedicine    5 months ago Type 2 diabetes mellitus with hyperglycemia, without long-term current use of insulin [facility])    Nissa Hopkins MD    Office Visit    6 months ago COPD exacerbation [facility])    Cassie Will, [facility], Sari Meza MD    Telemedicine

## 2023-05-11 ENCOUNTER — HOSPITAL ENCOUNTER (OUTPATIENT)
Dept: GENERAL RADIOLOGY | Facility: HOSPITAL | Age: 63
Discharge: HOME | End: 2023-05-11
Attending: INTERNAL MEDICINE
Payer: MEDICAID

## 2023-05-11 DIAGNOSIS — J44.1 COPD EXACERBATION (HCC): ICD-10-CM

## 2023-05-11 PROCEDURE — 71046 X-RAY EXAM CHEST 2 VIEWS: CPT | Performed by: INTERNAL MEDICINE

## 2023-05-16 ENCOUNTER — OFFICE VISIT (OUTPATIENT)
Facility: CLINIC | Age: 63
End: 2023-05-16

## 2023-05-16 VITALS
TEMPERATURE: 98 F | HEART RATE: 79 BPM | OXYGEN SATURATION: 96 % | RESPIRATION RATE: 18 BRPM | DIASTOLIC BLOOD PRESSURE: 50 MMHG | WEIGHT: 127.81 LBS | BODY MASS INDEX: 20.54 KG/M2 | HEIGHT: 66 IN | SYSTOLIC BLOOD PRESSURE: 116 MMHG

## 2023-05-16 DIAGNOSIS — J43.9 PULMONARY EMPHYSEMA, UNSPECIFIED EMPHYSEMA TYPE (HCC): Primary | ICD-10-CM

## 2023-05-16 DIAGNOSIS — F17.200 SMOKER: ICD-10-CM

## 2023-05-16 DIAGNOSIS — F33.1 MODERATE RECURRENT MAJOR DEPRESSION (HCC): ICD-10-CM

## 2023-05-16 PROBLEM — F33.0 MILD EPISODE OF RECURRENT MAJOR DEPRESSIVE DISORDER (HCC): Status: RESOLVED | Noted: 2022-06-16 | Resolved: 2023-05-16

## 2023-05-16 PROBLEM — F33.0 MILD EPISODE OF RECURRENT MAJOR DEPRESSIVE DISORDER: Status: RESOLVED | Noted: 2022-06-16 | Resolved: 2023-05-16

## 2023-05-16 PROBLEM — J44.1 COPD EXACERBATION (HCC): Status: RESOLVED | Noted: 2022-10-12 | Resolved: 2023-05-16

## 2023-05-16 PROBLEM — M79.641 PAIN OF RIGHT HAND: Status: RESOLVED | Noted: 2021-03-09 | Resolved: 2023-05-16

## 2023-05-16 PROCEDURE — 3008F BODY MASS INDEX DOCD: CPT | Performed by: INTERNAL MEDICINE

## 2023-05-16 PROCEDURE — 3078F DIAST BP <80 MM HG: CPT | Performed by: INTERNAL MEDICINE

## 2023-05-16 PROCEDURE — 3074F SYST BP LT 130 MM HG: CPT | Performed by: INTERNAL MEDICINE

## 2023-05-16 PROCEDURE — 99214 OFFICE O/P EST MOD 30 MIN: CPT | Performed by: INTERNAL MEDICINE

## 2023-05-16 RX ORDER — FLUOXETINE HYDROCHLORIDE 20 MG/1
CAPSULE ORAL
Qty: 90 CAPSULE | Refills: 3 | Status: SHIPPED | OUTPATIENT
Start: 2023-05-16

## 2023-05-16 NOTE — ASSESSMENT & PLAN NOTE
Patient has a chronic, cough, shortness of breath, and episodes of lightheadedness. Advised her to use the Symbicort twice daily, rather than occasionally. Check pulmonary function tests.

## 2023-05-16 NOTE — ASSESSMENT & PLAN NOTE
Patient's depression is not controlled. She does not want to see a psychiatrist or therapist.  We will increase her Prozac to 60 mg daily. Follow-up in 6 to 8 weeks.

## 2023-05-16 NOTE — PATIENT INSTRUCTIONS
Take 2 puffs of Symbicort twice a day for at least one month. Schedule the stress test, mammogram, and pulmonary function tests through central scheduling [phone].

## 2023-06-01 RX ORDER — AZELASTINE HYDROCHLORIDE 0.5 MG/ML
SOLUTION/ DROPS OPHTHALMIC
Qty: 18 ML | Refills: 0 | Status: SHIPPED | OUTPATIENT
Start: 2023-06-01

## 2023-06-01 RX ORDER — MONTELUKAST SODIUM 10 MG/1
TABLET ORAL
Qty: 90 TABLET | Refills: 0 | Status: SHIPPED | OUTPATIENT
Start: 2023-06-01

## 2023-06-01 RX ORDER — LEVOCETIRIZINE DIHYDROCHLORIDE 5 MG/1
TABLET, FILM COATED ORAL
Qty: 90 TABLET | Refills: 0 | Status: SHIPPED | OUTPATIENT
Start: 2023-06-01

## 2023-06-02 RX ORDER — DILTIAZEM HYDROCHLORIDE 60 MG/1
TABLET, FILM COATED ORAL
Qty: 10.2 G | Refills: 0 | Status: SHIPPED | OUTPATIENT
Start: 2023-06-02

## 2023-06-03 RX ORDER — FLUOXETINE HYDROCHLORIDE 40 MG/1
40 CAPSULE ORAL DAILY
Qty: 90 CAPSULE | Refills: 3 | Status: SHIPPED | OUTPATIENT
Start: 2023-06-03

## 2023-06-03 RX ORDER — DILTIAZEM HYDROCHLORIDE 60 MG/1
TABLET, FILM COATED ORAL
Qty: 10.2 G | Refills: 2 | OUTPATIENT
Start: 2023-06-03

## 2023-06-03 NOTE — TELEPHONE ENCOUNTER
Refill passed per [place], [facility] protocol    Requested Prescriptions   Pending Prescriptions Disp Refills    FLUOXETINE HCL 40 MG Oral Cap [Pharmacy Med Name: FLUOXETINE HYDROCHLORIDE 40MG CAPSULE] 90 capsule 1     Sig: TAKE ONE CAPSULE POQAM       Psychiatric Non-Scheduled (Anti-Anxiety) Passed - 6/3/2023 12:01 PM        Passed - In person appointment or virtual visit in the past 6 mos or appointment in next 3 mos     Recent Outpatient Visits              2 weeks ago Pulmonary emphysema, unspecified emphysema type [facility])    Mohsen Domingo MD    Office Visit    1 month ago Chest pressure    Mohsen Domingo MD    Office Visit    1 month ago Type 2 diabetes mellitus with other specified complication, without long-term current use of insulin [facility])    Tamy Winter, [address] [place] [address] Gillian Rachel MD    Office Visit    4 months ago History of COPD    [facility], [address] [place] [address], Delaney Francis MD    Telemedicine    6 months ago Type 2 diabetes mellitus with hyperglycemia, without long-term current use of insulin [facility])    Tamy Winter, [address] [facility], Cameron Hays MD    Office Visit

## 2023-07-07 RX ORDER — DILTIAZEM HYDROCHLORIDE 60 MG/1
TABLET, FILM COATED ORAL
Qty: 10.2 G | Refills: 2 | Status: SHIPPED | OUTPATIENT
Start: 2023-07-07

## 2023-07-07 NOTE — TELEPHONE ENCOUNTER
Requested Prescriptions   Pending Prescriptions Disp Refills    SYMBICORT 80-4.5 MCG/ACT Inhalation Aerosol [Pharmacy Med Name: SYMBICORT 80-4.5 AEROSOL] 10.2 g 2     Sig: INHALE 2 PUFFS INTO THE LUNGS 2 (TWO) TIMES DAILY.        Corticosteroids / Long-Acting Bronchodilators Passed - 7/6/2023  6:41 PM        Passed - Appt in past 6 mos or next 3 mos     Recent Outpatient Visits              1 month ago Pulmonary emphysema, unspecified emphysema type [facility])    [address], Bhupinder Zeng MD    Office Visit    2 months ago Chest pressure    [facility], Bhupinder Zeng MD    Office Visit    2 months ago Type 2 diabetes mellitus with other specified complication, without long-term current use of insulin [facility])    [address], [address] , [place] Kimmy New MD    Office Visit    5 months ago History of COPD    [facility], [address] [place] [address], Tracy Wilson MD    Telemedicine    7 months ago Type 2 diabetes mellitus with hyperglycemia, without long-term current use of insulin [facility])    [address], [address] [facility], Stefano Denver, MD    Office Visit

## 2023-07-10 DIAGNOSIS — R11.0 CHRONIC NAUSEA: ICD-10-CM

## 2023-07-11 NOTE — TELEPHONE ENCOUNTER
Requested Prescriptions     Pending Prescriptions Disp Refills    PROCHLORPERAZINE 5 MG Oral [Pharmacy Med Name: PROCHLORPERAZINE MALEATE 5MG TABLET] 30 tablet 2     Sig: TAKE 1 TABLET (5 MG TOTAL) BY MOUTH NIGHTLY AS NEEDED FOR NAUSEA    Lr: 4/16/2023          qty:30 tablet          refills:2  Lov: 4/16/2021

## 2023-07-14 RX ORDER — PROCHLORPERAZINE MALEATE 5 MG/1
5 TABLET ORAL NIGHTLY PRN
Qty: 30 TABLET | Refills: 1 | Status: SHIPPED | OUTPATIENT
Start: 2023-07-14

## 2023-07-24 RX ORDER — FLUTICASONE PROPIONATE 50 MCG
2 SPRAY, SUSPENSION (ML) NASAL DAILY
Qty: 48 ML | Refills: 2 | Status: SHIPPED | OUTPATIENT
Start: 2023-07-24

## 2023-07-24 NOTE — TELEPHONE ENCOUNTER
Requested Prescriptions   Pending Prescriptions Disp Refills    FLUTICASONE PROPIONATE 50 MCG/ACT Nasal Suspension [Pharmacy Med Name: FLUTICASONE PROPIONATE 50MCG SUSPENSION] 48 mL 2     Sig: USE 2 SPRAYS BY NASAL ROUTE DAILY.        Antihistamines Passed - 7/24/2023 10:37 AM        Passed - Appt in past 12 mos or next 1 mos     Recent Outpatient Visits              2 months ago Pulmonary emphysema, unspecified emphysema type [facility])    Shilpa Wheeler, Lul Hatfield MD    Office Visit    2 months ago Chest pressure    Donna Sage MD    Office Visit    2 months ago Type 2 diabetes mellitus with other specified complication, without long-term current use of insulin [facility])    Shilpa Wheeler, [address] , [place] Tariq Cardoso MD    Office Visit    5 months ago History of COPD    [facility], [address] [place] [address], Osman Salazar MD    Telemedicine    7 months ago Type 2 diabetes mellitus with hyperglycemia, without long-term current use of insulin [facility])    Shilpa Wheeler, [address] [facility], Delfina Heaton MD    Office Visit

## 2023-07-24 NOTE — TELEPHONE ENCOUNTER
LOV: 5/1/2023    RTC: 3 Months    FU: 9/25/2023 @ 2:30 pm     Last Refill: 4/3/2023    3 Month Supply Pending

## 2023-08-02 NOTE — PROGRESS NOTES
PRE -OP DIAGNOSIS: Right renal calculi with indwelling right double-J stent     POST-OP DIAGNOSIS: Same    PRINCIPAL PROCEDURE PERFORMED: Cystoscopy and removal of right double-J stent    DESCRIPTION OF PROCEDURE: The patient was brought to the [place] [address] Refused duplicate refill request.  Sent 7/31/23.    Reina Haywood RN, BSN  [facility]

## 2023-08-11 ENCOUNTER — HOSPITAL ENCOUNTER (OUTPATIENT)
Dept: CV DIAGNOSTICS | Facility: HOSPITAL | Age: 63
Discharge: HOME | End: 2023-08-11
Attending: INTERNAL MEDICINE
Payer: MEDICARE

## 2023-08-11 DIAGNOSIS — R07.89 CHEST PRESSURE: ICD-10-CM

## 2023-08-11 LAB
% OF MAX PREDICTED HR: 100 %
MAX DIASTOLIC BP: 80 MMHG
MAX HEART RATE: 139 BPM
MAX PREDICTED HEART RATE: 155 BPM
MAX SYSTOLIC BP: 178 MMHG
MAX WORK LOAD: 134

## 2023-08-11 PROCEDURE — 93016 CV STRESS TEST SUPVJ ONLY: CPT | Performed by: INTERNAL MEDICINE

## 2023-08-11 PROCEDURE — 93018 CV STRESS TEST I&R ONLY: CPT | Performed by: INTERNAL MEDICINE

## 2023-08-11 PROCEDURE — 93017 CV STRESS TEST TRACING ONLY: CPT | Performed by: INTERNAL MEDICINE

## 2023-08-24 RX ORDER — MONTELUKAST SODIUM 10 MG/1
10 TABLET ORAL EVERY EVENING
Qty: 90 TABLET | Refills: 1 | Status: SHIPPED | OUTPATIENT
Start: 2023-08-24

## 2023-08-24 NOTE — TELEPHONE ENCOUNTER
Dr. Ankur Dominguez would you like pt to follow up on a yearly basis or every 6 months? Pt last seen on 1/30/2023. No follow up timeline listed in office visit note. Pt has hx of COPD and allergic rhinitis. Requested Prescriptions   Pending Prescriptions Disp Refills    MONTELUKAST 10 MG Oral Tab [Pharmacy Med Name: MONTELUKAST SODIUM 10MG TABLET] 90 tablet 2     Sig: TAKE ONE TABLET BY MOUTH EACH EVENING       Corticosteroids / Long-Acting Bronchodilators Failed - 8/24/2023  9:17 AM        Failed - Appt in past 6 mos or next 3 mos     Recent Outpatient Visits              3 months ago Pulmonary emphysema, unspecified emphysema type [facility])    [address], [place] [address] Collin Celis MD    Office Visit    3 months ago Chest pressure    [facility], [address] Collin Celis MD    Office Visit    3 months ago Type 2 diabetes mellitus with other specified complication, without long-term current use of insulin [facility])    [address], [address] [place], [place] Johnathan Hector MD    Office Visit    6 months ago History of COPD    [facility], [address] [facility], Sidra Franklin MD    Telemedicine    8 months ago Type 2 diabetes mellitus with hyperglycemia, without long-term current use of insulin [facility])    [address], [address] [facility], Lorna Bullock MD    Office Visit          Future Appointments         Provider Department Appt Notes    [facility]    In 1 month Johnathan Hector MD [address], [address] [facility] DM follow up.

## 2023-08-28 ENCOUNTER — TELEPHONE (OUTPATIENT)
Dept: ALLERGY | Facility: CLINIC | Age: 63
End: 2023-08-28

## 2023-08-28 NOTE — TELEPHONE ENCOUNTER
According to the [employer]/covered-drugs    [email or web address]-content/[email or web address]. pdf    SYMBICORT tier 3   [place]; QL (10.2 per 30 day). [place]: Mail-Order Drug. QL: Quantity Limit. For certain drugs, the Plan limits the amount of the drug that we will cover. RN wonders if the pharmacy ran Rx as generic, which is why they show it as not covered? Last fill she was given generic by pharmacist. Pharmacist reports generic was the only thing that would go through her insurance. 8/26/2023 last fill. Too early to see if the medication will process for next month. According to message above, she should be able to receive one inhaler every 30 days. Pharmacist to contact office if there is any problems next month. Left message for patient to call back. Forms sent to scan.

## 2023-08-30 NOTE — PROGRESS NOTES
-- DO NOT REPLY / DO NOT REPLY ALL --  -- Message is from [facility] [employer] (ECO) --    ONLY TO BE USED WITHIN A REFILL MEDICATION ENCOUNTER    Med Refill  Is the patient currently having any symptoms?: No/Non-Emergent symptoms    Name of medication requested: See pended med    Has patient contacted the pharmacy? Yes    Is this the first request for the medication in the last 48 hours?: Yes      Patient is requesting a medication refill - medication is on active list      Full name of the provider who ordered the medication: DR JORDYN CAMPBELL     [facility] site name / Account # for provider: [facility] POD    Preferred Pharmacy: Pharmacy  [id number] - [place], [place] - [address] [facility] [address]    Patient confirmed the above pharmacy as correct?  Yes      Caller Information       Type Contact Phone/Fax    08/30/2023 03:03 PM CDT Phone (Incoming) Brandan Dyson (Self) [phone] (M)          Alternative phone number:     Can a detailed message be left?: Yes    Patient is completely out of medication: Verify if patient is currently experiencing symptoms. If patient is symptomatic, proceed with front end triage instead of medication refill. If patient is not symptomatic but is completely out of medication, shital as High priority when routing. Inform patient: “Please call back with any questions or concerns and if your condition becomes life threatening, you should seek immediate medical assistance by calling 911 or going to the Emergency Department for evaluation.”    Inform all patients: \"If the clinical team needs to contact you regarding this refill, please be aware the return phone call may come from an unidentified or out of state phone number and your refill request will be addressed as soon as the clinical team reviews your message.\"   Rg Navarrete is a 56year old female.     HPI:     HPI     Diabetic Eye Exam     Diabetes Type: Type 2, controlled with diet and taking oral medications    Duration: 10 years    Number of years diabetic: 10    Number of years on pills: 10    Number of year History    Tobacco Use      Smoking status: Current Some Day Smoker        Years: 45.00        Types: Cigarettes        Quit date: 2018        Years since quittin.5      Smokeless tobacco: Never Used      Tobacco comment: quit then starts smoking hydrocortisone 2.5 % External Cream Apply bid to involved areas as needed 60 g 0   • hydrOXYzine Pamoate 25 MG Oral Cap Take 1 capsule (25 mg total) by mouth nightly as needed (allergies).  30 capsule 5   • Albuterol Sulfate  (90 Base) MCG/ACT Inhala x3: Yes    Mood/Affect: Normal          Dilation     Both eyes: 1.0% Mydriacyl and 2.5% Kaden Synephrine @ 2:17 PM            Additional Tests     [place]       Right Left     Normal Normal            Slit Lamp and Fundus Exam     External Exam       Right Clarissa Fudge 5/25/2022) for Diabetic Eye exam.    5/25/2021  Scribed by: Litzy Ladd

## 2023-09-06 DIAGNOSIS — R11.0 CHRONIC NAUSEA: ICD-10-CM

## 2023-09-07 NOTE — TELEPHONE ENCOUNTER
Requested Prescriptions     Pending Prescriptions Disp Refills    PROCHLORPERAZINE 5 MG Oral [Pharmacy Med Name: PROCHLORPERAZINE MALEATE 5MG TABLET] 30 tablet 1     Sig: TAKE 1 TABLET (5 MG TOTAL) BY MOUTH NIGHTLY AS NEEDED FOR NAUSEA.       Lr: 7/14/2023          qty:30 tablet     refills:1  Lov: 4/16/2021

## 2023-09-10 RX ORDER — PROCHLORPERAZINE MALEATE 5 MG/1
5 TABLET ORAL NIGHTLY PRN
Qty: 30 TABLET | Refills: 1 | Status: SHIPPED | OUTPATIENT
Start: 2023-09-10

## 2023-09-23 RX ORDER — DILTIAZEM HYDROCHLORIDE 60 MG/1
TABLET, FILM COATED ORAL
Qty: 10.2 G | Refills: 2 | Status: SHIPPED | OUTPATIENT
Start: 2023-09-23

## 2023-09-23 NOTE — TELEPHONE ENCOUNTER
Refill requested for    Name from pharmacy: [facility] 80-4.5 AEROSOL         Will file in chart as: SYMBICORT 80-4.5 MCG/ACT Inhalation Aerosol    Sig: INHALE 2 PUFFS INTO THE LUNGS 2 (TWO) TIMES DAILY. Disp: 10.2 g    Refills: 2    Start: 9/22/2023    Class: Normal    Non-formulary    Last ordered: 2 months ago (7/7/2023) by Tamika Pacheco MD    Last refill: 8/26/2023    Rx #: [id number]    Corticosteroids / Long-Acting Bronchodilators [id number]/22/2023 11:37 AM   Protocol Details Appt in past 6 mos or next 3 mos      To be filled at: [facility] [address], [address] [phone], [phone]            Last office visit: 01/30/2023    Previously advised to follow up in one year (see telephone encounter from 8/24/2023)    F/U currently scheduled? Not at this time       ACTION: Refilled per protocol. Left message for patient that she is due for follow up in January 2024.

## 2023-10-02 ENCOUNTER — OFFICE VISIT (OUTPATIENT)
Dept: ENDOCRINOLOGY CLINIC | Facility: CLINIC | Age: 63
End: 2023-10-02

## 2023-10-02 VITALS
WEIGHT: 128.19 LBS | SYSTOLIC BLOOD PRESSURE: 106 MMHG | HEART RATE: 72 BPM | DIASTOLIC BLOOD PRESSURE: 48 MMHG | BODY MASS INDEX: 20.6 KG/M2 | HEIGHT: 66 IN

## 2023-10-02 DIAGNOSIS — E78.00 HYPERCHOLESTEROLEMIA: ICD-10-CM

## 2023-10-02 DIAGNOSIS — E03.9 ACQUIRED HYPOTHYROIDISM: ICD-10-CM

## 2023-10-02 DIAGNOSIS — E55.9 VITAMIN D DEFICIENCY: ICD-10-CM

## 2023-10-02 DIAGNOSIS — E11.69 TYPE 2 DIABETES MELLITUS WITH OTHER SPECIFIED COMPLICATION, WITHOUT LONG-TERM CURRENT USE OF INSULIN (HCC): Primary | ICD-10-CM

## 2023-10-02 LAB
CARTRIDGE LOT#: ABNORMAL NUMERIC
GLUCOSE BLOOD: 228
HEMOGLOBIN A1C: 6.6 % (ref 4.3–5.6)
TEST STRIP LOT #: NORMAL NUMERIC

## 2023-10-02 NOTE — PROGRESS NOTES
Name: Luna Barker  Date: 10/02/23    Referring Physician: No ref. provider found    HISTORY OF PRESENT ILLNESS   Luna Barker is a 70year old female who presents for evaluation and management of type 2 diabetes. She was diagnosed with diabetes about many years ago. She is here in follow up. She states that recently she has been feeling very weak and has been having hot flashes as well. Blood Glucose Today: 228 (had coffee with sugar-free cream about 45 minutes ago)  HbA1C or glycohemoglobin is 6.6 today (and it was 7.4 on 4/27/23 and it was 7.3 on 12/03/22 and it was 7.2 on 7/21/22 and it was 8.4 on 4/20/22 and it was 8.0 on 2/09/22 and it was 7.8 on 11/02/21 and it was 7.8 on 6/21/21)  Type 1 or Type 2?: Type 2  Medications for DM: Januvia 100mg PO qday  Checking 0 times per day  Episodes of hypoglycemia: none    Dietary compliance:   Better    Exercise: none, moves around a lot, walking around a lot    Polyuria/polydipsia: none    Blurred vision: none    Flu Vaccine This Season: does not want this    Covid Vaccine: Covid vaccine 1 and 2    REVIEW OF SYSTEMS  CV: Cardiovascular disease present: none         Hypertension present: low, not on meds         Hyperlipidemia present: LDL is increased (4/27/23) has not been taking atorvastatin or ezetimibe regularly         Peripheral Vascular Disease present: none    : Nephropathy present: none (4/27/23); creatinine- 0.85     Neuro: Neuropathy present: yes, on Gabapentin    Eyes: Diabetic retinopathy present: none            Most recent visit to eye doctor in last 12 months: yes, recently    Skin: Infection or ulceration: none    Osteoporosis: none    Thyroid disease: yes, on 88mcg PO qday; TSH- 4.990 (4/27/23)    Medications:     Current Outpatient Medications:     SYMBICORT 80-4.5 MCG/ACT Inhalation Aerosol, INHALE 2 PUFFS INTO THE LUNGS 2 (TWO) TIMES DAILY. , Disp: 10.2 g, Rfl: 2    prochlorperazine 5 MG Oral, Take 1 tablet (5 mg total) by mouth nightly as needed for Nausea., Disp: 30 tablet, Rfl: 1    montelukast 10 MG Oral Tab, Take 1 tablet (10 mg total) by mouth every evening., Disp: 90 tablet, Rfl: 1    SITagliptin Phosphate (JANUVIA) 100 MG Oral Tab, Take 1 tablet (100 mg total) by mouth daily. , Disp: 90 tablet, Rfl: 2    FLUTICASONE PROPIONATE 50 MCG/ACT Nasal Suspension, USE 2 SPRAYS BY NASAL ROUTE DAILY. , Disp: 48 mL, Rfl: 2    FLUoxetine HCl 40 MG Oral Cap, Take 1 capsule (40 mg total) by mouth daily. Take along with 20 mg for a total dose of 60 mg, Disp: 90 capsule, Rfl: 3    AZELASTINE HCL 0.05 % Ophthalmic Solution, APPLY 1 DROP TO EYE 2 TWICE A DAY, Disp: 18 mL, Rfl: 0    LEVOCETIRIZINE 5 MG Oral Tab, TAKE ONE TABLET BY MOUTH EVERY NIGHT AT BEDTIME, Disp: 90 tablet, Rfl: 0    FLUoxetine 20 MG Oral Cap, Take one daily along with 40 mg daily for total of 60 mg daily. , Disp: 90 capsule, Rfl: 3    LEVOTHYROXINE 88 MCG Oral Tab, TAKE ONE TABLET BY MOUTH EVERY MORNING BEFORE A MEAL, Disp: 90 tablet, Rfl: 1    ergocalciferol 1.25 MG (68916 UT) Oral Cap, Take 1 capsule (50,000 Units total) by mouth once a week., Disp: 12 capsule, Rfl: 1    Glucose Blood (ONETOUCH VERIO) In Vitro Strip, TEST BLOOD SUGAR THREE TIMES DAILY AS NEEDED, Disp: 300 strip, Rfl: 11    ONETOUCH DELICA PLUS [id number] Does not apply Misc, 1 STRIP BY IN VITRO ROUTE 3 (THREE) TIMES DAILY. , Disp: 300 each, Rfl: 3    levothyroxine 100 MCG Oral Tab, Take 1 tablet (100 mcg total) by mouth before breakfast., Disp: 90 tablet, Rfl: 0    atorvastatin 10 MG Oral Tab, Take 1 tablet (10 mg total) by mouth every other day., Disp: 90 tablet, Rfl: 1    benzonatate 200 MG Oral Cap, Take 1 capsule (200 mg total) by mouth 3 (three) times daily as needed for cough. , Disp: 40 capsule, Rfl: 1    gabapentin 800 MG Oral Tab, Take 1 tablet (800 mg total) by mouth 3 (three) times daily. , Disp: , Rfl:     ALBUTEROL 108 (90 Base) MCG/ACT Inhalation Aero Soln, INHALE 2 PUFFS INTO THE LUNGS EVERY 4 (FOUR) HOURS AS NEEDED FOR WHEEZING., Disp: 8.5 g, Rfl: 2    zolpidem 10 MG Oral Tab, Take 1 tablet (10 mg total) by mouth nightly as needed. AT BEDTIME, Disp: , Rfl:     NARCAN 4 MG/0.1ML Nasal Liquid, , Disp: , Rfl:     EZETIMIBE 10 MG Oral Tab, TAKE 1 TABLET (10 MG TOTAL) BY MOUTH NIGHTLY., Disp: 90 tablet, Rfl: 0    Tiotropium Bromide Monohydrate (SPIRIVA RESPIMAT) 1.25 MCG/ACT Inhalation Aero Soln, Inhale 2 puffs into the lungs daily. , Disp: 3 each, Rfl: 1    levocetirizine 5 MG Oral Tab, Take 1 tablet (5 mg total) by mouth every evening., Disp: 30 tablet, Rfl: 0    Blood Glucose Monitoring Suppl (ONETOUCH VERIO FLEX SYSTEM) w/Device Does not apply Kit, Take 1 Bottle by mouth As Directed., Disp: 1 kit, Rfl: 0    ondansetron 4 MG Oral Tablet Dispersible, Take 1-2 tablets (4-8 mg total) by mouth 2 (two) times daily as needed for Nausea., Disp: 60 tablet, Rfl: 11    diphenhydrAMINE HCl 50 MG Oral Cap, Take 50mg one hour before CT scan, Disp: 2 capsule, Rfl: 0    clonazePAM 0.5 MG Oral Tab, Take 1 tablet (0.5 mg total) by mouth nightly as needed. , Disp: , Rfl:     hydrocortisone 2.5 % External Cream, Apply bid to involved areas as needed, Disp: 60 g, Rfl: 0    hydrOXYzine Pamoate 25 MG Oral Cap, Take 1 capsule (25 mg total) by mouth nightly as needed (allergies). , Disp: 30 capsule, Rfl: 5    Blood Glucose Monitoring Suppl Supplies Does not apply Misc, Please supply patient with glucose meter, test strips and lancets covered by her insurance, test TID, Disp: 1 kit, Rfl: 0    OxyCODONE HCl IR 30 MG Oral Tab, Take 1 tablet (30 mg total) by mouth as needed. , Disp: , Rfl: 0     Allergies:     Radiology Contrast *    HIVES, SWELLING, SHORTNESS OF                            BREATH    Comment:Had some kind of contrast many years ago with an             x-ray.   Other                   NAUSEA ONLY    Comment:PO antibiotics    Social History:   Social History     Socioeconomic History    Marital status:    Tobacco Use    Smoking status: Some Days     Years: 39     Types: Cigarettes     Last attempt to quit: 2018     Years since quittin.8    Smokeless tobacco: Never    Tobacco comments:     quit then starts smoking someday   Vaping Use    Vaping Use: Never used   Substance and Sexual Activity    Alcohol use: Never    Drug use: No    Sexual activity: Not Currently   Other Topics Concern    Caffeine Concern Yes     Comment: coffee, 1cup/day    Exercise Yes   Social History Narrative    The patient does not use an assistive device. .      The patient does live in a home with stairs. Medical History:   Past Medical History:   Diagnosis Date    Calculus of kidney     COPD (chronic obstructive pulmonary disease) ([facility] [address]     Diabetes ([facility] [address]     Disorder of thyroid     High cholesterol     Kidney stones     PONV (postoperative nausea and vomiting)     Rotator cuff impingement syndrome of right shoulder 2017       Surgical history:   Past Surgical History:   Procedure Laterality Date    APPENDECTOMY      APPENDECTOMY      BLEPHAROPLASTY UPPER SKIN ONLY - OD - RIGHT EYE Right     [facility]     BLEPHAROPLASTY UPPER SKIN ONLY - OS - LEFT EYE Left     [address] [facility]     COLONOSCOPY N/A 2018    Procedure: COLONOSCOPY;  Surgeon: Phyllis Gallegos MD;  Location: [address] [place] [address] ENDOSCOPY    EGD  2018    LITHOTRIPSY      TONSILLECTOMY           PHYSICAL EXAM   10/02/23  1150   BP: 106/48   Pulse: 72   Weight: 128 lb 3.2 oz (58.2 kg)   Height: 5' 6\" (1.676 m)         General Appearance:  alert, well developed, in no acute distress  Eyes:  normal conjunctivae, sclera. , normal sclera and normal pupils  Psychiatric:  oriented to time, self, and place  Nutritional:  no abnormal weight gain or loss  Bilateral barefoot skin diabetic exam is normal, visualized feet and the appearance is normal.  Bilateral monofilament/sensation of both feet is normal.  Pulsation pedal pulse exam of both lower legs/feet is normal as well.         Lab Data:   Lab Results   Component Value Date     (H) 04/27/2023    A1C 6.6 (A) 10/02/2023     Lab Results   Component Value Date     (H) 04/27/2023    BUN 15 04/27/2023    BUNCREA 17.6 04/27/2023    CREATSERUM 0.85 04/27/2023    ANIONGAP 4 04/27/2023    GFRNAA 75 02/09/2022    GFRAA 87 02/09/2022    CA 9.3 04/27/2023    OSMOCALC 294 04/27/2023    ALKPHO 54 04/27/2023    AST 21 04/27/2023    ALT 32 04/27/2023    BILT 0.3 04/27/2023    TP 7.3 04/27/2023    ALB 4.0 04/27/2023    GLOBULIN 3.3 04/27/2023     04/27/2023    K 4.1 04/27/2023     04/27/2023    CO2 30.0 04/27/2023     Lab Results   Component Value Date    CHOLEST 348 (H) 04/27/2023    TRIG 65 04/27/2023    HDL 84 (H) 04/27/2023     (H) 04/27/2023    VLDL 15 04/27/2023    NONHDLC 264 (H) 04/27/2023     Lab Results   Component Value Date    MALBP 0.55 04/27/2023    CREUR 87.60 04/27/2023         ASSESSMENT/PLAN:    This is a 70year-old woman here for evaluation and management of uncontrolled type 2 diabetes. We discussed the ABCs of DM.     1.) Hyperglycemia Management- We discussed the importance of glycemic control to prevent complications of diabetes. We discussed the importance of SBGM. I offered and provided patient education materials and offered a blood glucose log book. - Continue Januvia 100mg PO qAM  - Start checking blood sugars at least once daily at different times of the day    2.) Management of Diabetic Complications- We discussed the complications of diabetes include retinopathy, neuropathy, nephropathy and cardiovascular disease.    - Ophtho- none, up to date  - Flu and Covid vaccine- up to date  - BP- at goal, not on meds  - Lipids- will check now; she states that after taking the atorvastatin, she has muscle cramps  - MAC- will check now  - CMP- will check now  - Neuropathy- she has this and is on gabapentin  - CAD- none    3.) Lifestyle Management for Diabetes- We discussed importance of a low CHO diet, and recommend 45gm per meal or 135gm per day. We discussed the importance of trying to follow a Mediterranean diet, with an emphasis on vegetables at every meal, with lots whole grains, and protein from either plant-based sources, or poultry and fish. - Diet- she is going to work on this  - Exercise- she is going to work on this    4.) Hypothyroidism- she is on 88mcg levothyroxine, check TSH and FT4 now    Return to clinic in 6 months    Prior to this encounter, I spent over 15 minutes with preparing for the visit, including reviewing documents from other specialties as well as from PCP and going over test results. During the face to face encounter, I spent an additional 15 minutes which were determined for follow-up. Greater than 50% of the time was spent in counseling, anticipatory guidance, and coordination of care. Patient concerns were answered to the best of my knowledge. 10/02/23  Dasia Jones MD

## 2023-10-06 ENCOUNTER — LAB ENCOUNTER (OUTPATIENT)
Dept: LAB | Facility: HOSPITAL | Age: 63
End: 2023-10-06
Attending: INTERNAL MEDICINE
Payer: MEDICARE

## 2023-10-06 DIAGNOSIS — E11.65 TYPE 2 DIABETES MELLITUS WITH HYPERGLYCEMIA, WITHOUT LONG-TERM CURRENT USE OF INSULIN (HCC): ICD-10-CM

## 2023-10-06 DIAGNOSIS — E03.9 ACQUIRED HYPOTHYROIDISM: ICD-10-CM

## 2023-10-06 DIAGNOSIS — E11.69 TYPE 2 DIABETES MELLITUS WITH OTHER SPECIFIED COMPLICATION, WITHOUT LONG-TERM CURRENT USE OF INSULIN (HCC): ICD-10-CM

## 2023-10-06 DIAGNOSIS — E55.9 VITAMIN D DEFICIENCY: ICD-10-CM

## 2023-10-06 LAB
ALBUMIN SERPL-MCNC: 4.1 G/DL (ref 3.4–5)
ALBUMIN/GLOB SERPL: 1.3 {RATIO} (ref 1–2)
ALP LIVER SERPL-CCNC: 53 U/L
ALT SERPL-CCNC: 25 U/L
ANION GAP SERPL CALC-SCNC: 6 MMOL/L (ref 0–18)
AST SERPL-CCNC: 17 U/L (ref 15–37)
BASOPHILS # BLD AUTO: 0.08 X10(3) UL (ref 0–0.2)
BASOPHILS NFR BLD AUTO: 1.6 %
BILIRUB SERPL-MCNC: 0.4 MG/DL (ref 0.1–2)
BUN BLD-MCNC: 14 MG/DL (ref 7–18)
BUN/CREAT SERPL: 17.7 (ref 10–20)
CALCIUM BLD-MCNC: 9.3 MG/DL (ref 8.5–10.1)
CHLORIDE SERPL-SCNC: 106 MMOL/L (ref 98–112)
CHOLEST SERPL-MCNC: 356 MG/DL (ref ?–200)
CO2 SERPL-SCNC: 29 MMOL/L (ref 21–32)
CREAT BLD-MCNC: 0.79 MG/DL
CREAT UR-SCNC: 101 MG/DL
DEPRECATED RDW RBC AUTO: 47.6 FL (ref 35.1–46.3)
EGFRCR SERPLBLD CKD-EPI 2021: 83 ML/MIN/1.73M2 (ref 60–?)
EOSINOPHIL # BLD AUTO: 0.15 X10(3) UL (ref 0–0.7)
EOSINOPHIL NFR BLD AUTO: 3 %
ERYTHROCYTE [DISTWIDTH] IN BLOOD BY AUTOMATED COUNT: 13.9 % (ref 11–15)
FASTING PATIENT LIPID ANSWER: YES
FASTING STATUS PATIENT QL REPORTED: YES
GLOBULIN PLAS-MCNC: 3.1 G/DL (ref 2.8–4.4)
GLUCOSE BLD-MCNC: 150 MG/DL (ref 70–99)
HCT VFR BLD AUTO: 41 %
HDLC SERPL-MCNC: 88 MG/DL (ref 40–59)
HGB BLD-MCNC: 13.3 G/DL
IMM GRANULOCYTES # BLD AUTO: 0.01 X10(3) UL (ref 0–1)
IMM GRANULOCYTES NFR BLD: 0.2 %
LDLC SERPL CALC-MCNC: 263 MG/DL (ref ?–100)
LDLC SERPL DIRECT ASSAY-MCNC: 241 MG/DL (ref ?–100)
LYMPHOCYTES # BLD AUTO: 2.49 X10(3) UL (ref 1–4)
LYMPHOCYTES NFR BLD AUTO: 50.1 %
MCH RBC QN AUTO: 29.9 PG (ref 26–34)
MCHC RBC AUTO-ENTMCNC: 32.4 G/DL (ref 31–37)
MCV RBC AUTO: 92.1 FL
MICROALBUMIN UR-MCNC: 0.87 MG/DL
MICROALBUMIN/CREAT 24H UR-RTO: 8.6 UG/MG (ref ?–30)
MONOCYTES # BLD AUTO: 0.34 X10(3) UL (ref 0.1–1)
MONOCYTES NFR BLD AUTO: 6.8 %
NEUTROPHILS # BLD AUTO: 1.9 X10 (3) UL (ref 1.5–7.7)
NEUTROPHILS # BLD AUTO: 1.9 X10(3) UL (ref 1.5–7.7)
NEUTROPHILS NFR BLD AUTO: 38.3 %
NONHDLC SERPL-MCNC: 268 MG/DL (ref ?–130)
OSMOLALITY SERPL CALC.SUM OF ELEC: 295 MOSM/KG (ref 275–295)
PLATELET # BLD AUTO: 187 10(3)UL (ref 150–450)
POTASSIUM SERPL-SCNC: 4 MMOL/L (ref 3.5–5.1)
PROT SERPL-MCNC: 7.2 G/DL (ref 6.4–8.2)
PTH-INTACT SERPL-MCNC: 40.2 PG/ML (ref 18.5–88)
RBC # BLD AUTO: 4.45 X10(6)UL
SODIUM SERPL-SCNC: 141 MMOL/L (ref 136–145)
T3FREE SERPL-MCNC: 2.05 PG/ML (ref 2.4–4.2)
T4 FREE SERPL-MCNC: 0.9 NG/DL (ref 0.8–1.7)
TRIGL SERPL-MCNC: 52 MG/DL (ref 30–149)
TSI SER-ACNC: 3.38 MIU/ML (ref 0.36–3.74)
VIT D+METAB SERPL-MCNC: 25.8 NG/ML (ref 30–100)
VLDLC SERPL CALC-MCNC: 12 MG/DL (ref 0–30)
WBC # BLD AUTO: 5 X10(3) UL (ref 4–11)

## 2023-10-06 PROCEDURE — 84439 ASSAY OF FREE THYROXINE: CPT

## 2023-10-06 PROCEDURE — 82330 ASSAY OF CALCIUM: CPT

## 2023-10-06 PROCEDURE — 84443 ASSAY THYROID STIM HORMONE: CPT

## 2023-10-06 PROCEDURE — 83970 ASSAY OF PARATHORMONE: CPT

## 2023-10-06 PROCEDURE — 82043 UR ALBUMIN QUANTITATIVE: CPT

## 2023-10-06 PROCEDURE — 80061 LIPID PANEL: CPT

## 2023-10-06 PROCEDURE — 82570 ASSAY OF URINE CREATININE: CPT

## 2023-10-06 PROCEDURE — 85025 COMPLETE CBC W/AUTO DIFF WBC: CPT

## 2023-10-06 PROCEDURE — 82306 VITAMIN D 25 HYDROXY: CPT

## 2023-10-06 PROCEDURE — 36415 COLL VENOUS BLD VENIPUNCTURE: CPT

## 2023-10-06 PROCEDURE — 80053 COMPREHEN METABOLIC PANEL: CPT

## 2023-10-06 PROCEDURE — 83721 ASSAY OF BLOOD LIPOPROTEIN: CPT

## 2023-10-06 PROCEDURE — 84481 FREE ASSAY (FT-3): CPT

## 2023-10-09 LAB — LC CALCIUM, IONIZED: 5.1 MG/DL

## 2023-10-16 RX ORDER — AZELASTINE HYDROCHLORIDE 0.5 MG/ML
SOLUTION/ DROPS OPHTHALMIC
Qty: 18 ML | Refills: 0 | Status: SHIPPED | OUTPATIENT
Start: 2023-10-16

## 2023-10-16 NOTE — TELEPHONE ENCOUNTER
Refill requested for   Requested Prescriptions   Pending Prescriptions Disp Refills    AZELASTINE HCL 0.05 % Ophthalmic Solution [Pharmacy Med Name: AZELASTINE HYDROCHLORIDE 0.05% SOLUTION] 18 mL 0     Sig: APPLY 1 DROP TO EYE 2 TWICE A DAY       Antihistamines Passed - 10/16/2023  4:46 PM        Passed - Appt in past 12 mos or next 1 mos     Recent Outpatient Visits              2 weeks ago Type 2 diabetes mellitus with other specified complication, without long-term current use of insulin [facility])    [facility], [facility] [place]    Office Visit    5 months ago Pulmonary emphysema, unspecified emphysema type [facility])    [address], [address] [place] Caitlin [place] MD    Office Visit    5 months ago Chest pressure    [facility], [address] Cesia Reese MD    Office Visit    5 months ago Type 2 diabetes mellitus with other specified complication, without long-term current use of insulin [facility])    [address], [address] [place] Jessa Barton MD    Office Visit    8 months ago History of COPD    [facility], [address] [place] [address] Fariba Christensen MD    Telemedicine          Future Appointments         Provider Department Appt Notes    In 2 weeks Caitlin [place] MD [address], [address] [facility] Rebecca 5 mos follow up  Policy advised    In 5 months Jessa Barton MD [address], [address] [place] 6 mo follow up  [employer] Informed)                          Last office visit: 1/30/23    Previously advised to follow up in No follow-up timeline advised in last office visit. Diagnosis of AR advised to follow-up in 1 year    F/U currently scheduled?  Not at this time    ACTION: Refill filled per protocol

## 2023-10-19 ENCOUNTER — TELEPHONE (OUTPATIENT)
Dept: ENDOCRINOLOGY CLINIC | Facility: CLINIC | Age: 63
End: 2023-10-19

## 2023-10-19 DIAGNOSIS — E11.69 TYPE 2 DIABETES MELLITUS WITH OTHER SPECIFIED COMPLICATION, WITHOUT LONG-TERM CURRENT USE OF INSULIN (HCC): Primary | ICD-10-CM

## 2023-10-19 RX ORDER — BLOOD SUGAR DIAGNOSTIC
STRIP MISCELLANEOUS
Qty: 300 STRIP | Refills: 0 | Status: SHIPPED | OUTPATIENT
Start: 2023-10-19

## 2023-10-19 RX ORDER — LANCETS 33 GAUGE
1 EACH MISCELLANEOUS 3 TIMES DAILY
Qty: 300 EACH | Refills: 0 | Status: SHIPPED | OUTPATIENT
Start: 2023-10-19

## 2023-10-19 RX ORDER — BLOOD-GLUCOSE METER
1 EACH MISCELLANEOUS ONCE
Qty: 1 KIT | Refills: 0 | Status: SHIPPED | OUTPATIENT
Start: 2023-10-19 | End: 2023-10-19

## 2023-10-19 NOTE — TELEPHONE ENCOUNTER
Patient requesting to speak with RN regarding lab results.  Patient states she is not feeling well - dizziness, even when she is laying down, racing heartbeat and very tired.  Please call.  Thank you.

## 2023-10-19 NOTE — TELEPHONE ENCOUNTER
Spoke with Dr. Gurrola over the phone. She is aware of the patient's history and does not feel symptoms are related to low blood sugars. She is on Januvia which does not have hypoglycemia as a SE and last A1C was 6.6%. She feels symptoms are more related to diet and the fact that the patient does not eat enough calories during the day. RN to stress importance of increasing caloric intake and give examples of calorie dense foods the patient can try if she does not want to eat often.     TSH and FT4 were normal. Dr. Gurrola is not recommending changing levothyroxine dose or brand. Does not think hair loss is due to this.     Vitamin D level is slightly low. Patient to increase current daily dose of vitamin D by 2000 international units daily.     Cholesterol is elevated. However patient has indicated to Dr. Gurrola that she is unable to tolerate any statin medication. Therefore she is to follow up with her PCP regarding treatment options for cholesterol.     I called the patient and provided her with these instructions. Provided a list of healthy, calorie dense foods. Recommended hydration with water and decrease caffeine intake (patient states she also drinks a lot of coffee) She verbalized her understanding and had no further questions at this time. Agreed with plan of care.     Triage RNs, please follow up with Dr. Glover regarding cholesterol levels. Patient has upcoming appt with Dr. Glover at the end of the month.

## 2023-10-19 NOTE — TELEPHONE ENCOUNTER
Dr. Glover, please see note below regarding follow up for high cholesterol levels and patient's inability to tolerate statin medications. Patient with upcoming appt.     Future Appointments   Date Time Provider Department Center   10/31/2023  5:20 PM Melody Glover MD [id number]  [facility]   1/18/2024  1:45 PM [facility]   4/8/2024  1:30 PM [facility]

## 2023-10-19 NOTE — TELEPHONE ENCOUNTER
See symptoms reported by patient below. Requesting labs be reviewed. Orders for new glucometer and supplies pending. Pt asking if she should try brand synthroid.     LOV 10/2/23 for type 2 diabetes, hypothyroidism, vit D deficiency, hypercholesterolemia. She was started on Januvia 100 mg daily. Plan was to complete labs prior to discussing medication for cholesterol and thyroid dose change.     Component      Latest Ref Rng 10/6/2023   Glucose      70 - 99 mg/dL 150 (H)    Sodium      136 - 145 mmol/L 141    Potassium      3.5 - 5.1 mmol/L 4.0    Chloride      98 - 112 mmol/L 106    Carbon Dioxide, Total      21.0 - 32.0 mmol/L 29.0    ANION GAP      0 - 18 mmol/L 6    BUN      7 - 18 mg/dL 14    CREATININE      0.55 - 1.02 mg/dL 0.79    BUN/CREATININE RATIO      10.0 - 20.0  17.7    CALCIUM      8.5 - 10.1 mg/dL 9.3    CALCULATED OSMOLALITY      275 - 295 mOsm/kg 295    EGFR      >=60 mL/min/1.73m2 83    ALT (SGPT)      13 - 56 U/L 25    AST (SGOT)      15 - 37 U/L 17    ALKALINE PHOSPHATASE      50 - 130 U/L 53    Total Bilirubin      0.1 - 2.0 mg/dL 0.4    PROTEIN, TOTAL      6.4 - 8.2 g/dL 7.2    Albumin      3.4 - 5.0 g/dL 4.1    Globulin      2.8 - 4.4 g/dL 3.1    A/G Ratio      1.0 - 2.0  1.3    Patient Fasting for CMP? Yes    Cholesterol, Total      <200 mg/dL 356 (H)    HDL Cholesterol      40 - 59 mg/dL 88 (H)    Triglycerides      30 - 149 mg/dL 52    LDL Cholesterol Calc      <100 mg/dL 263 (H)    VLDL      0 - 30 mg/dL 12    NON-HDL CHOLESTEROL      <130 mg/dL 268 (H)    Patient Fasting for Lipid? Yes    MALB URINE      mg/dL 0.87    CREATININE UR RANDOM      mg/dL 101.00    MALB/CRE CALC      <=30.0 ug/mg 8.6    TSH      0.358 - 3.740 mIU/mL 3.380    T4,Free (Direct)      0.8 - 1.7 ng/dL 0.9    T3 FREE      2.40 - 4.20 pg/mL 2.05 (L)    PTH INTACT      18.5 - 88.0 pg/mL 40.2    CALCIUM, IONIZED      4.5 - 5.6 mg/dL 5.1    DIRECT LDL      <100 mg/dL 241 (H)    VITAMIN D, 25-OH, TOTAL      30.0 - 100.0  ng/mL 25.8 (L)         I called and spoke with Genny. She says for the past 5 days she has been having intermittent dizziness for extended periods of time. This even occurs if she lays down. She felt so dizzy at one point that she almost passed out. She has also been having intermittent tachycardia. Hair loss increasing. Denies SOB, CP, severe headache, feeling of \"room spinning\", nausea, vomiting.     I did explain to the patient that dizziness and tachycardia could be symptoms of hypoglycemia. I asked how her sugars have been. She states her glucometer broke \"awhile ago\" so she has not been checking her sugars. I discussed rule of 15's with the patient and recommended she have a 15 g sugar snack followed by meal or snack with protein when she is feeling dizzy to see if it helps. Also to  new meter and supplies ASAP and start checking sugars. Orders pended to provider. She states she does not eat consistent meals and often goes a long time without eating. Stressed importance of consistent meals with diabetes.     Patient is requesting that recent labs be reviewed. Confirmed she is currently taking levothyroxine 88 mcg daily. Patient asking if she should try brand name synthroid for the hair loss.

## 2023-10-20 ENCOUNTER — TELEPHONE (OUTPATIENT)
Facility: CLINIC | Age: 63
End: 2023-10-20

## 2023-10-20 NOTE — TELEPHONE ENCOUNTER
----- Message from Nasim Blackman CMA sent at 12/3/2018 11:15 AM CST -----  Regardin yr CLN recall  Recall colon in 5 years per. Colon done 18    GI RNs - 1.  Please print and mail this letter to patient; 2. Recall for colonoscopy exam in 5 years

## 2023-10-20 NOTE — TELEPHONE ENCOUNTER
Patient returning call, stated cannot do [place], asking if okay to come to [place] on Monday.    [Normal Growth] : growth [Normal Development] : development [None] : No medical problems [No Elimination Concerns] : elimination [No Feeding Concerns] : feeding [No Skin Concerns] : skin [Normal Sleep Pattern] : sleep [Parental (Maternal) Well-Being] : parental (maternal) well-being [Infant-Family Synchrony] : infant-family synchrony [Nutritional Adequacy] : nutritional adequacy [Infant Behavior] : infant behavior [Safety] : safety [No Medications] : ~He/She~ is not on any medications [Parent/Guardian] : parent/guardian [FreeTextEntry1] : SAURAV ON VACCINES- PENTACEL AND ROTAVIRUS &  HEP B\par REVIEW BABY'S GROWTH AND DEVELOPMENT- NORMAL\par ANSWER PARENTS QUESTIONS AND ADDRESS THEIR CONCERNS- nasal cognestion and snoring- silent refluxer- added rantidine \par RETURN IN 1 MONTH FOR WELL\par \par

## 2023-10-21 NOTE — TELEPHONE ENCOUNTER
I am booked Monday, but I currently have a few openings on Tuesday at [place].  If one of those is still available, please see if she can come then.

## 2023-10-25 ENCOUNTER — TELEPHONE (OUTPATIENT)
Dept: ENDOCRINOLOGY CLINIC | Facility: CLINIC | Age: 63
End: 2023-10-25

## 2023-10-25 NOTE — TELEPHONE ENCOUNTER
Receiced DWO form from [place] for glucose strips, lancets and meter. Filled out. Placed in providers folder for signature.
Benefits, risks, and possible complications of procedure explained to patient/caregiver who verbalized understanding and gave verbal consent.

## 2023-12-04 ENCOUNTER — HOSPITAL ENCOUNTER (EMERGENCY)
Facility: HOSPITAL | Age: 63
Discharge: HOME | End: 2023-12-04
Payer: MEDICARE

## 2023-12-04 VITALS
RESPIRATION RATE: 15 BRPM | SYSTOLIC BLOOD PRESSURE: 109 MMHG | DIASTOLIC BLOOD PRESSURE: 53 MMHG | OXYGEN SATURATION: 96 % | HEART RATE: 72 BPM

## 2023-12-04 DIAGNOSIS — R00.2 PALPITATIONS: Primary | ICD-10-CM

## 2023-12-04 LAB
ANION GAP SERPL CALC-SCNC: 4 MMOL/L (ref 0–18)
ATRIAL RATE: 73 BPM
BASOPHILS # BLD AUTO: 0.08 X10(3) UL (ref 0–0.2)
BASOPHILS NFR BLD AUTO: 0.9 %
BUN BLD-MCNC: 17 MG/DL (ref 9–23)
BUN/CREAT SERPL: 17.2 (ref 10–20)
CALCIUM BLD-MCNC: 9.6 MG/DL (ref 8.7–10.4)
CHLORIDE SERPL-SCNC: 104 MMOL/L (ref 98–112)
CO2 SERPL-SCNC: 27 MMOL/L (ref 21–32)
CREAT BLD-MCNC: 0.99 MG/DL
DEPRECATED RDW RBC AUTO: 47.8 FL (ref 35.1–46.3)
EGFRCR SERPLBLD CKD-EPI 2021: 63 ML/MIN/1.73M2 (ref 60–?)
EOSINOPHIL # BLD AUTO: 0.13 X10(3) UL (ref 0–0.7)
EOSINOPHIL NFR BLD AUTO: 1.5 %
ERYTHROCYTE [DISTWIDTH] IN BLOOD BY AUTOMATED COUNT: 14.3 % (ref 11–15)
GLUCOSE BLD-MCNC: 232 MG/DL (ref 70–99)
HCT VFR BLD AUTO: 37.9 %
HGB BLD-MCNC: 12.2 G/DL
IMM GRANULOCYTES # BLD AUTO: 0.03 X10(3) UL (ref 0–1)
IMM GRANULOCYTES NFR BLD: 0.3 %
LYMPHOCYTES # BLD AUTO: 1.89 X10(3) UL (ref 1–4)
LYMPHOCYTES NFR BLD AUTO: 21.2 %
MCH RBC QN AUTO: 29.3 PG (ref 26–34)
MCHC RBC AUTO-ENTMCNC: 32.2 G/DL (ref 31–37)
MCV RBC AUTO: 91.1 FL
MONOCYTES # BLD AUTO: 0.7 X10(3) UL (ref 0.1–1)
MONOCYTES NFR BLD AUTO: 7.9 %
NEUTROPHILS # BLD AUTO: 6.08 X10 (3) UL (ref 1.5–7.7)
NEUTROPHILS # BLD AUTO: 6.08 X10(3) UL (ref 1.5–7.7)
NEUTROPHILS NFR BLD AUTO: 68.2 %
OSMOLALITY SERPL CALC.SUM OF ELEC: 289 MOSM/KG (ref 275–295)
P AXIS: 74 DEGREES
P-R INTERVAL: 180 MS
PLATELET # BLD AUTO: 228 10(3)UL (ref 150–450)
POTASSIUM SERPL-SCNC: 3.9 MMOL/L (ref 3.5–5.1)
Q-T INTERVAL: 400 MS
QRS DURATION: 94 MS
QTC CALCULATION (BEZET): 440 MS
R AXIS: 39 DEGREES
RBC # BLD AUTO: 4.16 X10(6)UL
SODIUM SERPL-SCNC: 135 MMOL/L (ref 136–145)
T AXIS: 76 DEGREES
TROPONIN I SERPL HS-MCNC: 4 NG/L
VENTRICULAR RATE: 73 BPM
WBC # BLD AUTO: 8.9 X10(3) UL (ref 4–11)

## 2023-12-04 PROCEDURE — 99284 EMERGENCY DEPT VISIT MOD MDM: CPT

## 2023-12-04 PROCEDURE — 85025 COMPLETE CBC W/AUTO DIFF WBC: CPT | Performed by: NURSE PRACTITIONER

## 2023-12-04 PROCEDURE — 80048 BASIC METABOLIC PNL TOTAL CA: CPT | Performed by: NURSE PRACTITIONER

## 2023-12-04 PROCEDURE — 93005 ELECTROCARDIOGRAM TRACING: CPT

## 2023-12-04 PROCEDURE — 84484 ASSAY OF TROPONIN QUANT: CPT | Performed by: NURSE PRACTITIONER

## 2023-12-04 PROCEDURE — 96374 THER/PROPH/DIAG INJ IV PUSH: CPT

## 2023-12-04 PROCEDURE — 93010 ELECTROCARDIOGRAM REPORT: CPT

## 2023-12-04 RX ORDER — ONDANSETRON 2 MG/ML
4 INJECTION INTRAMUSCULAR; INTRAVENOUS ONCE
Status: COMPLETED | OUTPATIENT
Start: 2023-12-04 | End: 2023-12-04

## 2023-12-04 NOTE — ED PROVIDER NOTES
Patient Seen in: [facility] AND [facility] Emergency Department      History     Chief Complaint   Patient presents with    Palpitations    Medication Reaction     Stated Complaint: medication    Subjective:   62yo/f w hx of COPD, HLD, DM reports to the ED w concern for a possible accidental overdose of oxycontin. Takes 30mg tabs, up to 7 per day. Rx for 1-2 tabs per time. Accidentally took 4 mistaking the 2nd two tablets for compazine while watching netflix. No chest pain. Reports she realized her mistake and had palpitations, now resolved. This was 2.5 hours ago. No vomiting. No drowsiness. Also took [place] as prescribed.              Objective:   Past Medical History:   Diagnosis Date    Calculus of kidney     COPD (chronic obstructive pulmonary disease) ([address]     Diabetes ([address]     Disorder of thyroid     High cholesterol     Kidney stones     PONV (postoperative nausea and vomiting)     Rotator cuff impingement syndrome of right shoulder 2017              Past Surgical History:   Procedure Laterality Date    APPENDECTOMY      APPENDECTOMY      BLEPHAROPLASTY UPPER SKIN ONLY - OD - RIGHT EYE Right     [facility]     BLEPHAROPLASTY UPPER SKIN ONLY - OS - LEFT EYE Left     [facility] [place]     COLONOSCOPY N/A 2018    Procedure: COLONOSCOPY;  Surgeon: Mona Joshi MD;  Location: [facility] ENDOSCOPY    EGD  2018    LITHOTRIPSY      TONSILLECTOMY                  Social History     Socioeconomic History    Marital status:    Tobacco Use    Smoking status: Some Days     Years: 39     Types: Cigarettes     Last attempt to quit: 2018     Years since quittin.0    Smokeless tobacco: Never    Tobacco comments:     quit then starts smoking someday   Vaping Use    Vaping Use: Never used   Substance and Sexual Activity    Alcohol use: Never    Drug use: No    Sexual activity: Not Currently   Other Topics Concern    Caffeine Concern Yes     Comment: coffee, 1cup/day    Exercise Yes   Social History Narrative    The patient does not use an assistive device. .      The patient does live in a home with stairs. Review of Systems   All other systems reviewed and are negative. Positive for stated complaint: medication  Other systems are as noted in HPI. Constitutional and vital signs reviewed. All other systems reviewed and negative except as noted above. Physical Exam     ED Triage Vitals [12/04/23 4059]   /62   Pulse 77   Resp 18   Temp    Temp src    SpO2 94 %   O2 Device None (Room air)       Current:/52   Pulse 72   Resp 19   SpO2 98%         Physical Exam  Vitals and nursing note reviewed. Constitutional:       General: She is not in acute distress. Appearance: She is well-developed. HENT:      Head: Normocephalic and atraumatic. Nose: Nose normal.      Mouth/Throat:      Mouth: Mucous membranes are moist.   Eyes:      Conjunctiva/sclera: Conjunctivae normal.      Pupils: Pupils are equal, round, and reactive to light. Cardiovascular:      Rate and Rhythm: Normal rate and regular rhythm. Heart sounds: Normal heart sounds. Pulmonary:      Effort: Pulmonary effort is normal.      Breath sounds: Normal breath sounds. Abdominal:      General: Bowel sounds are normal.      Palpations: Abdomen is soft. Musculoskeletal:         General: No tenderness or deformity. Normal range of motion. Cervical back: Normal range of motion and neck supple. Skin:     General: Skin is warm and dry. Capillary Refill: Capillary refill takes less than 2 seconds. Findings: No rash. Comments: Normal color   Neurological:      General: No focal deficit present. Mental Status: She is alert and oriented to person, place, and time. GCS: GCS eye subscore is 4. GCS verbal subscore is 5. GCS motor subscore is 6. Cranial Nerves: No cranial nerve deficit.       Gait: Gait normal.            ED Course     Labs Reviewed   BASIC METABOLIC PANEL (8) - Abnormal; Notable for the following components:       Result Value    Glucose 232 (*)     Sodium 135 (*)     All other components within normal limits   CBC W/ DIFFERENTIAL - Abnormal; Notable for the following components:    RDW-SD 47.8 (*)     All other components within normal limits   TROPONIN I HIGH SENSITIVITY - Normal   CBC WITH DIFFERENTIAL WITH PLATELET    Narrative: The following orders were created for panel order CBC With Differential With Platelet. Procedure                               Abnormality         Status                     ---------                               -----------         ------                     CBC W/ DIFFERENTIAL[[id number]]          Abnormal            Final result                 Please view results for these tests on the individual orders. EKG    Rate, intervals and axes as noted on EKG Report. Rate: 73   Rhythm: Sinus Rhythm  Reading: NSR no gabo no ectopy normal axis  Stable clinical condition  No comparison                          MDM               Medical Decision Making  64yo/f w hx and exam as stated; palpitations  Non toxic well appearing  Labs unremarkable  Gcs 15  Lungs ctab  No ams  No vomiting    Plan  Dc to home  Close fu      Amount and/or Complexity of Data Reviewed  Labs:  Decision-making details documented in ED Course. Risk  OTC drugs. Prescription drug management. Disposition and Plan     Clinical Impression:  1. Palpitations         Disposition:  Discharge  12/4/2023  6:59 am    Follow-up:  Denice Greawl MD  [address] [place] [address] [address] [id number] [phone]    Follow up      We recommend that you schedule follow up care with a primary care provider within the next three months to obtain basic health screening including reassessment of your blood pressure.       Medications Prescribed:  Current Discharge Medication List

## 2023-12-04 NOTE — ED QUICK NOTES
Patient calm and content in triage waiting room. This triage RN called poison control. Patient should be monitored at least 4 hours from time of ingestion per poison control. Ingested at 03:330am. Spoke with representative Lc Leon whom states monitor for hypoxia and hypotension.  No labs recommended unless MD request. Case #[id number]

## 2023-12-04 NOTE — ED INITIAL ASSESSMENT (HPI)
Patient arrived from home, c/c palpitation after accidentally taking 2 additional oxycodone by mistake. +nausea. Denies SI/HI. States it was dark and grabbed the wrong medication.

## 2024-01-19 ENCOUNTER — TELEPHONE (OUTPATIENT)
Dept: ENDOCRINOLOGY CLINIC | Facility: CLINIC | Age: 64
End: 2024-01-19

## 2024-01-19 DIAGNOSIS — E78.00 HYPERCHOLESTEROLEMIA: ICD-10-CM

## 2024-01-19 DIAGNOSIS — E03.9 ACQUIRED HYPOTHYROIDISM: ICD-10-CM

## 2024-01-19 DIAGNOSIS — E11.69 TYPE 2 DIABETES MELLITUS WITH OTHER SPECIFIED COMPLICATION, WITHOUT LONG-TERM CURRENT USE OF INSULIN (HCC): Primary | ICD-10-CM

## 2024-01-19 DIAGNOSIS — E55.9 VITAMIN D DEFICIENCY: ICD-10-CM

## 2024-01-19 RX ORDER — LEVOTHYROXINE SODIUM 88 UG/1
88 TABLET ORAL
Qty: 90 TABLET | Refills: 0 | Status: SHIPPED | OUTPATIENT
Start: 2024-01-19 | End: 2024-01-27

## 2024-01-19 NOTE — TELEPHONE ENCOUNTER
LOV: 10/02/23    Future Appointments   Date Time Provider Department Center   4/8/2024  1:30 PM Dasia Gurrola MD [facility]  [place]     Component      Latest Ref Rng 10/6/2023   TSH      0.358 - 3.740 mIU/mL 3.380    T4,Free (Direct)      0.8 - 1.7 ng/dL 0.9    T3 FREE      2.40 - 4.20 pg/mL 2.05 (L)       No repeat blood work noted in chart.   RN refilled medication per protocol since patient is running out of the medicine.     Dr. Gurrola - please advise if pt needs to do thyroid blood test before seeing you on 4/8/24. Thank you.

## 2024-01-24 ENCOUNTER — TELEPHONE (OUTPATIENT)
Dept: ENDOCRINOLOGY CLINIC | Facility: CLINIC | Age: 64
End: 2024-01-24

## 2024-01-24 DIAGNOSIS — E03.9 ACQUIRED HYPOTHYROIDISM: ICD-10-CM

## 2024-01-24 NOTE — TELEPHONE ENCOUNTER
Received DWO from [employer], form has been filled out to the best of my ability and placed in providers folder for completion.

## 2024-01-24 NOTE — TELEPHONE ENCOUNTER
LOV: 10/02/2023    RTC: 6 Months    FU: 04/08/2024    Last Refill:      Month Supply Pending      Left voice message for pt to call back to confirm, refill request and which dosage.

## 2024-01-27 RX ORDER — LEVOTHYROXINE SODIUM 88 UG/1
88 TABLET ORAL
Qty: 90 TABLET | Refills: 0 | Status: SHIPPED | OUTPATIENT
Start: 2024-01-27

## 2024-01-27 NOTE — TELEPHONE ENCOUNTER
Levothyroxine Rx was sent to immediate care pharmacy on 1/19/24. Spoke with patient and resent 88g levothyroxine Rx to [facility]. She will have labs done before her appt in April.

## 2024-02-06 ENCOUNTER — NURSE TRIAGE (OUTPATIENT)
Dept: INTERNAL MEDICINE CLINIC | Facility: CLINIC | Age: 64
End: 2024-02-06

## 2024-02-06 NOTE — TELEPHONE ENCOUNTER
Action Requested: Summary for Provider     []  Critical Lab, Recommendations Needed  [] Need Additional Advice  []   FYI    []   Need Orders  [] Need Medications Sent to Pharmacy  []  Other     SUMMARY: patient complains of Low back pain/possible flank pain radiating through buttocks- worse to right side. Scheduled an appointment to be seen. Went over home care advice. Call back or go to Walk in care or Immediate care if new symptoms arise or if s/sx worsen or has questions or concerns. Patient verbalized understanding and agrees with plan.    Future Appointments   Date Time Provider Department Center   2/7/2024  3:30 PM Daisy Lockwood APRN [facility] [place]   2/26/2024  2:45 PM Franck Vargas MD [facility] [place]     Reason for call: Low Back Pain  Onset: this morning    History of kidney stones  Patient unsure if this is related to kidney stones or related to ongoing back issues    Moderate pain-constant; was able to go to work today and will be going to work tomorrow    No known cause- no heavy lifting, injury, twisting or bending the wrong way    Will be taking prescription ibuprofen 600 mg- has not yet taken any pain medication today  Denies fever, weakness, numbness, tingling, problems with bowel/bladder control, burning with urination, blood in urine  Reason for Disposition   Patient wants to be seen    Protocols used: Back Pain-A-OH

## 2024-02-07 ENCOUNTER — OFFICE VISIT (OUTPATIENT)
Dept: INTERNAL MEDICINE CLINIC | Facility: CLINIC | Age: 64
End: 2024-02-07

## 2024-02-07 VITALS
HEIGHT: 66 IN | SYSTOLIC BLOOD PRESSURE: 132 MMHG | BODY MASS INDEX: 21.38 KG/M2 | OXYGEN SATURATION: 99 % | DIASTOLIC BLOOD PRESSURE: 69 MMHG | HEART RATE: 71 BPM | WEIGHT: 133 LBS

## 2024-02-07 DIAGNOSIS — M54.42 ACUTE BILATERAL LOW BACK PAIN WITH BILATERAL SCIATICA: ICD-10-CM

## 2024-02-07 DIAGNOSIS — R29.898 BILATERAL LEG WEAKNESS: ICD-10-CM

## 2024-02-07 DIAGNOSIS — R10.9 FLANK PAIN: Primary | ICD-10-CM

## 2024-02-07 DIAGNOSIS — M54.41 ACUTE BILATERAL LOW BACK PAIN WITH BILATERAL SCIATICA: ICD-10-CM

## 2024-02-07 LAB
APPEARANCE: CLEAR
BILIRUBIN: NEGATIVE
GLUCOSE (URINE DIPSTICK): 100 MG/DL
MULTISTIX LOT#: ABNORMAL NUMERIC
NITRITE, URINE: NEGATIVE
PH, URINE: 6.5 (ref 4.5–8)
SPECIFIC GRAVITY: 1.02 (ref 1–1.03)
UROBILINOGEN,SEMI-QN: 0.2 MG/DL (ref 0–1.9)

## 2024-02-07 PROCEDURE — 81002 URINALYSIS NONAUTO W/O SCOPE: CPT

## 2024-02-07 PROCEDURE — 99214 OFFICE O/P EST MOD 30 MIN: CPT

## 2024-02-07 RX ORDER — METHYLPREDNISOLONE 4 MG/1
TABLET ORAL
Qty: 1 EACH | Refills: 0 | Status: SHIPPED | OUTPATIENT
Start: 2024-02-07

## 2024-02-07 NOTE — PROGRESS NOTES
Subjective:   Genny Mc is a 63 year old female who presents for Low Back Pain (Pain across in lower back and has pain and weakness in legs ) and Dizziness     C/c pain across the low back which started yesterday morning when she woke up, was up to an 8/10. Was radiating to the lower abdomen. Also noticed weakness in legs yesterday when walking and trying to get out of the car or up the stairs. Had slight tingling down both legs, mostly on the anterior aspect. Yesterday was 1000x worse and is more tolerable today. Has a history of back pain but this is different. Took ibuprofen 600mg and oxycodone which did not help. Sees a pain specialist for back pain who has done injections for her in the past. Denies saddle anesthesia, no bladder or bowel issues, no fever. Wondering if this could be kidney stones - had them in 2017. Denies burning with urination, frequency, but thought urine looked a little orange the night before   She is not so worried about her back as she has chronic back issues but would like to know if she has a kidney stone or infection     Notes she is \"always\" dizzy, is diabetic and doesn't eat well - this is not different from her baseline. No presyncope or syncope.    Per RN triage note:  Onset: this morning     History of kidney stones  Patient unsure if this is related to kidney stones or related to ongoing back issues  Moderate pain-constant; was able to go to work today and will be going to work tomorrow  No known cause- no heavy lifting, injury, twisting or bending the wrong way  Will be taking prescription ibuprofen 600 mg- has not yet taken any pain medication today  Denies fever, weakness, numbness, tingling, problems with bowel/bladder control, burning with urination, blood in urine    History/Other:    Chief Complaint Reviewed and Verified  Nursing Notes Reviewed and   Verified  Tobacco Reviewed  Allergies Reviewed  Medications Reviewed    OB Status Reviewed         Tobacco:  She  currently smokes tobacco.  Social History    Tobacco Use      Smoking status: Some Days        Years: 45        Types: Cigarettes        Last attempt to quit: 2018        Years since quittin.2      Smokeless tobacco: Never      Tobacco comments: quit then starts smoking someday    Current Outpatient Medications   Medication Sig Dispense Refill    methylPREDNISolone (MEDROL) 4 MG Oral Tablet Therapy Pack As directed. 1 each 0    levothyroxine 88 MCG Oral Tab Take 1 tablet (88 mcg total) by mouth before breakfast. 90 tablet 0    Lancets (ONETOUCH DELICA PLUS [id number]) Does not apply Misc 1 each by In Vitro route 3 (three) times daily. Use test strip to check blood sugar 3 times per day 300 each 0    Glucose Blood (ONETOUCH VERIO) In Vitro Strip Use test strips to check blood sugar 3 times per day. 300 strip 0    AZELASTINE HCL 0.05 % Ophthalmic Solution APPLY 1 DROP TO EYE 2 TWICE A DAY 18 mL 0    SYMBICORT 80-4.5 MCG/ACT Inhalation Aerosol INHALE 2 PUFFS INTO THE LUNGS 2 (TWO) TIMES DAILY. 10.2 g 2    prochlorperazine 5 MG Oral Take 1 tablet (5 mg total) by mouth nightly as needed for Nausea. 30 tablet 1    montelukast 10 MG Oral Tab Take 1 tablet (10 mg total) by mouth every evening. 90 tablet 1    SITagliptin Phosphate (JANUVIA) 100 MG Oral Tab Take 1 tablet (100 mg total) by mouth daily. 90 tablet 2    FLUTICASONE PROPIONATE 50 MCG/ACT Nasal Suspension USE 2 SPRAYS BY NASAL ROUTE DAILY. 48 mL 2    FLUoxetine HCl 40 MG Oral Cap Take 1 capsule (40 mg total) by mouth daily. Take along with 20 mg for a total dose of 60 mg 90 capsule 3    LEVOCETIRIZINE 5 MG Oral Tab TAKE ONE TABLET BY MOUTH EVERY NIGHT AT BEDTIME 90 tablet 0    FLUoxetine 20 MG Oral Cap Take one daily along with 40 mg daily for total of 60 mg daily. 90 capsule 3    ergocalciferol 1.25 MG (75469 UT) Oral Cap Take 1 capsule (50,000 Units total) by mouth once a week. 12 capsule 1    Glucose Blood (ONETOUCH VERIO) In Vitro Strip TEST BLOOD  SUGAR THREE TIMES DAILY AS NEEDED 300 strip 11    atorvastatin 10 MG Oral Tab Take 1 tablet (10 mg total) by mouth every other day. 90 tablet 1    benzonatate 200 MG Oral Cap Take 1 capsule (200 mg total) by mouth 3 (three) times daily as needed for cough. 40 capsule 1    gabapentin 800 MG Oral Tab Take 1 tablet (800 mg total) by mouth 3 (three) times daily.      ALBUTEROL 108 (90 Base) MCG/ACT Inhalation Aero Soln INHALE 2 PUFFS INTO THE LUNGS EVERY 4 (FOUR) HOURS AS NEEDED FOR WHEEZING. 8.5 g 2    zolpidem 10 MG Oral Tab Take 1 tablet (10 mg total) by mouth nightly as needed. AT BEDTIME      NARCAN 4 MG/0.1ML Nasal Liquid       EZETIMIBE 10 MG Oral Tab TAKE 1 TABLET (10 MG TOTAL) BY MOUTH NIGHTLY. 90 tablet 0    Tiotropium Bromide Monohydrate (SPIRIVA RESPIMAT) 1.25 MCG/ACT Inhalation Aero Soln Inhale 2 puffs into the lungs daily. 3 each 1    levocetirizine 5 MG Oral Tab Take 1 tablet (5 mg total) by mouth every evening. 30 tablet 0    ondansetron 4 MG Oral Tablet Dispersible Take 1-2 tablets (4-8 mg total) by mouth 2 (two) times daily as needed for Nausea. 60 tablet 11    diphenhydrAMINE HCl 50 MG Oral Cap Take 50mg one hour before CT scan 2 capsule 0    clonazePAM 0.5 MG Oral Tab Take 1 tablet (0.5 mg total) by mouth nightly as needed.      hydrocortisone 2.5 % External Cream Apply bid to involved areas as needed 60 g 0    hydrOXYzine Pamoate 25 MG Oral Cap Take 1 capsule (25 mg total) by mouth nightly as needed (allergies). 30 capsule 5    Blood Glucose Monitoring Suppl Supplies Does not apply Misc Please supply patient with glucose meter, test strips and lancets covered by her insurance, test TID 1 kit 0    OxyCODONE HCl IR 30 MG Oral Tab Take 1 tablet (30 mg total) by mouth as needed.  0     Review of Systems:  Review of Systems   Constitutional: Negative.    Respiratory: Negative.     Cardiovascular: Negative.    Gastrointestinal: Negative.    Musculoskeletal:  Positive for back pain.   Skin: Negative.     Neurological:  Positive for dizziness, weakness and numbness.       Objective:   /69   Pulse 71   Ht 5' 6\" (1.676 m)   Wt 133 lb (60.3 kg)   SpO2 99%   BMI 21.47 kg/m²  Estimated body mass index is 21.47 kg/m² as calculated from the following:    Height as of this encounter: 5' 6\" (1.676 m).    Weight as of this encounter: 133 lb (60.3 kg).  Physical Exam  Vitals reviewed.   Constitutional:       General: She is not in acute distress.     Appearance: Normal appearance. She is well-developed.   Cardiovascular:      Rate and Rhythm: Normal rate and regular rhythm.      Heart sounds: Normal heart sounds.   Pulmonary:      Effort: Pulmonary effort is normal.      Breath sounds: Normal breath sounds.   Musculoskeletal:      Cervical back: No bony tenderness.      Thoracic back: No bony tenderness.      Lumbar back: No bony tenderness. Positive right straight leg raise test and positive left straight leg raise test.   Skin:     General: Skin is warm and dry.   Neurological:      Mental Status: She is alert and oriented to person, place, and time.       Assessment & Plan:   1. Flank pain (Primary)  -     POC Urinalysis, Manual Dip without microscopy [[id number]]  -     Urine Culture, Routine; Future; Expected date: 02/07/2024  -     Urine Culture, Routine  2. Acute bilateral low back pain with bilateral sciatica  -     MRI SPINE LUMBAR (CPT=72148); Future; Expected date: 02/07/2024  -     Physiatry Referral - In Network  -     methylPREDNISolone; As directed.  Dispense: 1 each; Refill: 0  3. Bilateral leg weakness  -     MRI SPINE LUMBAR (CPT=72148); Future; Expected date: 02/07/2024  -     Physiatry Referral - In Network      DEVIKA Norwood, 2/7/2024, 3:35 PM

## 2024-02-09 NOTE — TELEPHONE ENCOUNTER
LMTCB=transfer to triage. PAST MEDICAL HISTORY:  Diabetes     Diverticulosis     High cholesterol     HTN (hypertension)     Hypothyroid     Renal cancer

## 2024-04-02 ENCOUNTER — LAB ENCOUNTER (OUTPATIENT)
Dept: LAB | Facility: HOSPITAL | Age: 64
End: 2024-04-02
Attending: INTERNAL MEDICINE
Payer: MEDICARE

## 2024-04-02 DIAGNOSIS — E55.9 VITAMIN D DEFICIENCY: ICD-10-CM

## 2024-04-02 DIAGNOSIS — E03.9 ACQUIRED HYPOTHYROIDISM: ICD-10-CM

## 2024-04-02 DIAGNOSIS — E11.69 TYPE 2 DIABETES MELLITUS WITH OTHER SPECIFIED COMPLICATION, WITHOUT LONG-TERM CURRENT USE OF INSULIN (HCC): ICD-10-CM

## 2024-04-02 DIAGNOSIS — E78.00 HYPERCHOLESTEROLEMIA: ICD-10-CM

## 2024-04-02 LAB
ALBUMIN SERPL-MCNC: 4.5 G/DL (ref 3.2–4.8)
ALBUMIN/GLOB SERPL: 2 {RATIO} (ref 1–2)
ALP LIVER SERPL-CCNC: 52 U/L
ALT SERPL-CCNC: 13 U/L
ANION GAP SERPL CALC-SCNC: 3 MMOL/L (ref 0–18)
AST SERPL-CCNC: 16 U/L (ref ?–34)
BILIRUB SERPL-MCNC: 0.4 MG/DL (ref 0.2–1.1)
BUN BLD-MCNC: 12 MG/DL (ref 9–23)
BUN/CREAT SERPL: 14.1 (ref 10–20)
CALCIUM BLD-MCNC: 9.6 MG/DL (ref 8.7–10.4)
CHLORIDE SERPL-SCNC: 108 MMOL/L (ref 98–112)
CHOLEST SERPL-MCNC: 251 MG/DL (ref ?–200)
CO2 SERPL-SCNC: 30 MMOL/L (ref 21–32)
CREAT BLD-MCNC: 0.85 MG/DL
CREAT UR-SCNC: 76.3 MG/DL
EGFRCR SERPLBLD CKD-EPI 2021: 76 ML/MIN/1.73M2 (ref 60–?)
FASTING PATIENT LIPID ANSWER: YES
FASTING STATUS PATIENT QL REPORTED: YES
GLOBULIN PLAS-MCNC: 2.3 G/DL (ref 2.8–4.4)
GLUCOSE BLD-MCNC: 155 MG/DL (ref 70–99)
HDLC SERPL-MCNC: 70 MG/DL (ref 40–59)
LDLC SERPL CALC-MCNC: 174 MG/DL (ref ?–100)
MICROALBUMIN UR-MCNC: 1 MG/DL
MICROALBUMIN/CREAT 24H UR-RTO: 13.1 UG/MG (ref ?–30)
NONHDLC SERPL-MCNC: 181 MG/DL (ref ?–130)
OSMOLALITY SERPL CALC.SUM OF ELEC: 295 MOSM/KG (ref 275–295)
POTASSIUM SERPL-SCNC: 4.2 MMOL/L (ref 3.5–5.1)
PROT SERPL-MCNC: 6.8 G/DL (ref 5.7–8.2)
SODIUM SERPL-SCNC: 141 MMOL/L (ref 136–145)
T4 FREE SERPL-MCNC: 1.4 NG/DL (ref 0.8–1.7)
TRIGL SERPL-MCNC: 45 MG/DL (ref 30–149)
TSI SER-ACNC: 0.87 MIU/ML (ref 0.55–4.78)
VIT D+METAB SERPL-MCNC: 22.8 NG/ML (ref 30–100)
VLDLC SERPL CALC-MCNC: 9 MG/DL (ref 0–30)

## 2024-04-02 PROCEDURE — 84443 ASSAY THYROID STIM HORMONE: CPT

## 2024-04-02 PROCEDURE — 80061 LIPID PANEL: CPT

## 2024-04-02 PROCEDURE — 84439 ASSAY OF FREE THYROXINE: CPT

## 2024-04-02 PROCEDURE — 82043 UR ALBUMIN QUANTITATIVE: CPT

## 2024-04-02 PROCEDURE — 82306 VITAMIN D 25 HYDROXY: CPT

## 2024-04-02 PROCEDURE — 36415 COLL VENOUS BLD VENIPUNCTURE: CPT

## 2024-04-02 PROCEDURE — 80053 COMPREHEN METABOLIC PANEL: CPT

## 2024-04-02 PROCEDURE — 82570 ASSAY OF URINE CREATININE: CPT

## 2024-04-03 ENCOUNTER — TELEPHONE (OUTPATIENT)
Dept: ENDOCRINOLOGY CLINIC | Facility: CLINIC | Age: 64
End: 2024-04-03

## 2024-04-03 DIAGNOSIS — E11.69 TYPE 2 DIABETES MELLITUS WITH OTHER SPECIFIED COMPLICATION, WITHOUT LONG-TERM CURRENT USE OF INSULIN (HCC): Primary | ICD-10-CM

## 2024-04-03 NOTE — TELEPHONE ENCOUNTER
Patient calling was informed by pharmacy medication refill was denied. Please call.     SITagliptin Phosphate (JANUVIA) 100 MG Oral Tab

## 2024-04-04 NOTE — TELEPHONE ENCOUNTER
Noted.  Thank you. Zyrtec or Claritin as needed for allergies    Hi Amy,     If you are due for any health screening(s) below please notify me so we can arrange them to be ordered and scheduled. Most healthy patients at your age complete them, but you are free to accept or refuse.     If you can't do it, I'll definitely understand. If you can, I'd certainly appreciate it!    Tests to Keep You Healthy    Mammogram: Met on 10/4/2023  Colon Cancer Screening: DUE  Last Blood Pressure <= 139/89 (4/4/2024): Yes      Its time for your colon cancer screening     Colorectal cancer is one of the leading causes of cancer death for men and women but it doesnt have to be. Screenings can prevent colorectal cancer or find it early enough to treat and cure the disease.     Our records indicate that you may be overdue for colon cancer screening. A colonoscopy or stool screening test can help identify patients at risk for developing colon cancer. Cancer screenings save lives, so schedule yours today to stay healthy.     A colonoscopy is the preferred test for detecting colon cancer. It is needed only once every 10 years if results are negative. While you are sedated, a flexible, lighted tube with a tiny camera is inserted into the rectum and advanced through the colon to look for cancers.     An alternative screening test that is used at home and returned to the lab may also be used. It detects hidden blood in bowel movements which could indicate cancer in the colon. If results are positive, you will need a colonoscopy to determine if the blood is a sign of cancer. This type of follow up (diagnostic) colonoscopy usually requires additional copays as required by your insurance provider.     If you recently had your colon cancer screening performed outside of [facility], please let your Health care team know so that they can update your health record. Please contact your PCP if you have any questions.

## 2024-04-09 NOTE — TELEPHONE ENCOUNTER
Patient is calling to follow up on getting refill for the Januvia as she is almost out of her medication. Patient states that this is her 3rd call and still no call back. Patient does have a follow up appointment scheduled for 5/16/2024. Patient states that she did get the lab tests done.

## 2024-04-09 NOTE — TELEPHONE ENCOUNTER
Endocrine Refill protocol for oral and injectable diabetic medications    Protocol Criteria:    -Appointment with Endocrinology completed in the last 6 months or scheduled in the next 3 months    -A1c result <8.5% in the past 6 months      Verify the above has been completed or scheduled in the appropriate timeline. If so can send a 90 day supply with 1 refill.     Last completed office visit: 10/02/2023  Next scheduled Follow up: 05/16/2024  Last A1c result: 6.6    Rx sent per protocol. Pt notified.

## 2024-05-10 ENCOUNTER — TELEPHONE (OUTPATIENT)
Dept: ENDOCRINOLOGY CLINIC | Facility: CLINIC | Age: 64
End: 2024-05-10

## 2024-05-10 NOTE — TELEPHONE ENCOUNTER
[facility], form filled out and placed in providers folder for signature and review.

## 2024-05-16 ENCOUNTER — OFFICE VISIT (OUTPATIENT)
Dept: ENDOCRINOLOGY CLINIC | Facility: CLINIC | Age: 64
End: 2024-05-16

## 2024-05-16 VITALS
WEIGHT: 130.38 LBS | DIASTOLIC BLOOD PRESSURE: 59 MMHG | BODY MASS INDEX: 20.95 KG/M2 | SYSTOLIC BLOOD PRESSURE: 119 MMHG | HEIGHT: 66 IN | HEART RATE: 79 BPM

## 2024-05-16 DIAGNOSIS — E78.00 HYPERCHOLESTEROLEMIA: ICD-10-CM

## 2024-05-16 DIAGNOSIS — E11.69 TYPE 2 DIABETES MELLITUS WITH OTHER SPECIFIED COMPLICATION, WITHOUT LONG-TERM CURRENT USE OF INSULIN (HCC): Primary | ICD-10-CM

## 2024-05-16 DIAGNOSIS — E55.9 VITAMIN D DEFICIENCY: ICD-10-CM

## 2024-05-16 DIAGNOSIS — E03.9 ACQUIRED HYPOTHYROIDISM: ICD-10-CM

## 2024-05-16 LAB
CARTRIDGE LOT#: ABNORMAL NUMERIC
GLUCOSE BLOOD: 167
HEMOGLOBIN A1C: 8.6 % (ref 4.3–5.6)
TEST STRIP LOT #: NORMAL NUMERIC

## 2024-05-16 PROCEDURE — 99214 OFFICE O/P EST MOD 30 MIN: CPT | Performed by: INTERNAL MEDICINE

## 2024-05-16 PROCEDURE — 83036 HEMOGLOBIN GLYCOSYLATED A1C: CPT | Performed by: INTERNAL MEDICINE

## 2024-05-16 PROCEDURE — 82947 ASSAY GLUCOSE BLOOD QUANT: CPT | Performed by: INTERNAL MEDICINE

## 2024-05-16 RX ORDER — ERGOCALCIFEROL 1.25 MG/1
50000 CAPSULE ORAL WEEKLY
Qty: 12 CAPSULE | Refills: 0 | Status: SHIPPED | OUTPATIENT
Start: 2024-05-16 | End: 2024-08-14

## 2024-05-16 NOTE — PROGRESS NOTES
Name: Genny Mc  Date: 5/16/24    Referring Physician: No ref. provider found    HISTORY OF PRESENT ILLNESS   Genny Mc is a 63 year old female who presents for evaluation and management of type 2 diabetes. She was diagnosed with diabetes about many years ago. She is here in follow up.     Blood Glucose Today: 167 (had coffee with sugar-free cream about 45 minutes ago)  HbA1C or glycohemoglobin is 8.6 today (and it was 6.6 on 10/02/23 and it was 7.4 on 4/27/23 and it was 7.3 on 12/03/22 and it was 7.2 on 7/21/22 and it was 8.4 on 4/20/22 and it was 8.0 on 2/09/22 and it was 7.8 on 11/02/21 and it was 7.8 on 6/21/21)  Type 1 or Type 2?: Type 2  Medications for DM: Januvia 100mg PO qday  Checking 0 times per day  Episodes of hypoglycemia: none    Dietary compliance:   Better    Exercise: none, moves around a lot, walking around a lot    Polyuria/polydipsia: none    Blurred vision: none    Flu Vaccine This Season: does not want this    Covid Vaccine: Covid vaccine 1 and 2    REVIEW OF SYSTEMS  CV: Cardiovascular disease present: none         Hypertension present: low, not on meds         Hyperlipidemia present: LDL is increased (4/02/24) has been taking atorvastatin and ezetimibe regularly, improved since October 2023.         Peripheral Vascular Disease present: none    : Nephropathy present: none (4/02/24); creatinine- 0.85     Neuro: Neuropathy present: yes, on Gabapentin    Eyes: Diabetic retinopathy present: none            Most recent visit to eye doctor in last 12 months: yes, recently    Skin: Infection or ulceration: none    Osteoporosis: none; vitamin D- 22.8 (4/02/24)    Thyroid disease: yes, on 88mcg PO qday; TSH- 0.871 (4/02/24)    Medications:     Current Outpatient Medications:     SITagliptin Phosphate (JANUVIA) 100 MG Oral Tab, Take 1 tablet (100 mg total) by mouth daily., Disp: 90 tablet, Rfl: 1    methylPREDNISolone (MEDROL) 4 MG Oral Tablet Therapy Pack, As directed., Disp: 1 each, Rfl:  0    levothyroxine 88 MCG Oral Tab, Take 1 tablet (88 mcg total) by mouth before breakfast., Disp: 90 tablet, Rfl: 0    Lancets (ONETOUCH DELICA PLUS [id number]) Does not apply Misc, 1 each by In Vitro route 3 (three) times daily. Use test strip to check blood sugar 3 times per day, Disp: 300 each, Rfl: 0    Glucose Blood (ONETOUCH VERIO) In Vitro Strip, Use test strips to check blood sugar 3 times per day., Disp: 300 strip, Rfl: 0    AZELASTINE HCL 0.05 % Ophthalmic Solution, APPLY 1 DROP TO EYE 2 TWICE A DAY, Disp: 18 mL, Rfl: 0    SYMBICORT 80-4.5 MCG/ACT Inhalation Aerosol, INHALE 2 PUFFS INTO THE LUNGS 2 (TWO) TIMES DAILY., Disp: 10.2 g, Rfl: 2    prochlorperazine 5 MG Oral, Take 1 tablet (5 mg total) by mouth nightly as needed for Nausea., Disp: 30 tablet, Rfl: 1    montelukast 10 MG Oral Tab, Take 1 tablet (10 mg total) by mouth every evening., Disp: 90 tablet, Rfl: 1    FLUTICASONE PROPIONATE 50 MCG/ACT Nasal Suspension, USE 2 SPRAYS BY NASAL ROUTE DAILY., Disp: 48 mL, Rfl: 2    FLUoxetine HCl 40 MG Oral Cap, Take 1 capsule (40 mg total) by mouth daily. Take along with 20 mg for a total dose of 60 mg, Disp: 90 capsule, Rfl: 3    LEVOCETIRIZINE 5 MG Oral Tab, TAKE ONE TABLET BY MOUTH EVERY NIGHT AT BEDTIME, Disp: 90 tablet, Rfl: 0    FLUoxetine 20 MG Oral Cap, Take one daily along with 40 mg daily for total of 60 mg daily., Disp: 90 capsule, Rfl: 3    ergocalciferol 1.25 MG (53142 UT) Oral Cap, Take 1 capsule (50,000 Units total) by mouth once a week., Disp: 12 capsule, Rfl: 1    Glucose Blood (ONETOUCH VERIO) In Vitro Strip, TEST BLOOD SUGAR THREE TIMES DAILY AS NEEDED, Disp: 300 strip, Rfl: 11    atorvastatin 10 MG Oral Tab, Take 1 tablet (10 mg total) by mouth every other day., Disp: 90 tablet, Rfl: 1    benzonatate 200 MG Oral Cap, Take 1 capsule (200 mg total) by mouth 3 (three) times daily as needed for cough., Disp: 40 capsule, Rfl: 1    gabapentin 800 MG Oral Tab, Take 1 tablet (800 mg total) by mouth  3 (three) times daily., Disp: , Rfl:     ALBUTEROL 108 (90 Base) MCG/ACT Inhalation Aero Soln, INHALE 2 PUFFS INTO THE LUNGS EVERY 4 (FOUR) HOURS AS NEEDED FOR WHEEZING., Disp: 8.5 g, Rfl: 2    zolpidem 10 MG Oral Tab, Take 1 tablet (10 mg total) by mouth nightly as needed. AT BEDTIME, Disp: , Rfl:     NARCAN 4 MG/0.1ML Nasal Liquid, , Disp: , Rfl:     EZETIMIBE 10 MG Oral Tab, TAKE 1 TABLET (10 MG TOTAL) BY MOUTH NIGHTLY., Disp: 90 tablet, Rfl: 0    Tiotropium Bromide Monohydrate (SPIRIVA RESPIMAT) 1.25 MCG/ACT Inhalation Aero Soln, Inhale 2 puffs into the lungs daily., Disp: 3 each, Rfl: 1    levocetirizine 5 MG Oral Tab, Take 1 tablet (5 mg total) by mouth every evening., Disp: 30 tablet, Rfl: 0    ondansetron 4 MG Oral Tablet Dispersible, Take 1-2 tablets (4-8 mg total) by mouth 2 (two) times daily as needed for Nausea., Disp: 60 tablet, Rfl: 11    diphenhydrAMINE HCl 50 MG Oral Cap, Take 50mg one hour before CT scan, Disp: 2 capsule, Rfl: 0    clonazePAM 0.5 MG Oral Tab, Take 1 tablet (0.5 mg total) by mouth nightly as needed., Disp: , Rfl:     hydrocortisone 2.5 % External Cream, Apply bid to involved areas as needed, Disp: 60 g, Rfl: 0    hydrOXYzine Pamoate 25 MG Oral Cap, Take 1 capsule (25 mg total) by mouth nightly as needed (allergies)., Disp: 30 capsule, Rfl: 5    Blood Glucose Monitoring Suppl Supplies Does not apply Misc, Please supply patient with glucose meter, test strips and lancets covered by her insurance, test TID, Disp: 1 kit, Rfl: 0    OxyCODONE HCl IR 30 MG Oral Tab, Take 1 tablet (30 mg total) by mouth as needed., Disp: , Rfl: 0     Allergies:   Allergies   Allergen Reactions    Radiology Contrast Iodinated Dyes HIVES, SWELLING and SHORTNESS OF BREATH     Had some kind of contrast many years ago with an x-ray.     Other NAUSEA ONLY     PO antibiotics       Social History:   Social History     Socioeconomic History    Marital status:    Tobacco Use    Smoking status: Some Days      Current packs/day: 0.00     Types: Cigarettes     Start date: 1973     Last attempt to quit: 2018     Years since quittin.5    Smokeless tobacco: Never    Tobacco comments:     quit then starts smoking someday   Vaping Use    Vaping status: Never Used   Substance and Sexual Activity    Alcohol use: Never    Drug use: No    Sexual activity: Not Currently   Other Topics Concern    Caffeine Concern Yes     Comment: coffee, 1cup/day    Exercise Yes   Social History Narrative    The patient does not use an assistive device..      The patient does live in a home with stairs.     Social Determinants of Health      Received from [facility]       Medical History:   Past Medical History:    Calculus of kidney    COPD (chronic obstructive pulmonary disease) (HCC)    Diabetes (HCC)    Disorder of thyroid    High cholesterol    Kidney stones    PONV (postoperative nausea and vomiting)    Rotator cuff impingement syndrome of right shoulder       Surgical history:   Past Surgical History:   Procedure Laterality Date    Appendectomy      Appendectomy      Blepharoplasty upper skin only - od - right eye Right     [facility]     Blepharoplasty upper skin only - os - left eye Left     [facility]     Colonoscopy N/A 2018    Procedure: COLONOSCOPY;  Surgeon: Juan Marcus MD;  Location: [facility] ENDOSCOPY    Egd  2018    Lithotripsy      Tonsillectomy           PHYSICAL EXAM  Vitals:    24 1238   BP: 119/59   BP Location: Right arm   Patient Position: Sitting   Cuff Size: adult   Pulse: 79   Weight: 130 lb 6.4 oz (59.1 kg)   Height: 5' 6\" (1.676 m)           General Appearance:  alert, well developed, in no acute distress  Eyes:  normal conjunctivae, sclera., normal sclera and normal pupils  Psychiatric:  oriented to time, self, and place  Nutritional:  no abnormal weight gain or loss  Bilateral barefoot skin diabetic exam is normal, visualized feet and the  appearance is normal.  Bilateral monofilament/sensation of both feet is normal.  Pulsation pedal pulse exam of both lower legs/feet is normal as well.        Lab Data:   Lab Results   Component Value Date     (H) 04/27/2023    A1C 8.6 (A) 05/16/2024     Lab Results   Component Value Date     (H) 04/02/2024    BUN 12 04/02/2024    BUNCREA 14.1 04/02/2024    CREATSERUM 0.85 04/02/2024    ANIONGAP 3 04/02/2024    GFRNAA 75 02/09/2022    GFRAA 87 02/09/2022    CA 9.6 04/02/2024    OSMOCALC 295 04/02/2024    ALKPHO 52 04/02/2024    AST 16 04/02/2024    ALT 13 04/02/2024    BILT 0.4 04/02/2024    TP 6.8 04/02/2024    ALB 4.5 04/02/2024    GLOBULIN 2.3 (L) 04/02/2024     04/02/2024    K 4.2 04/02/2024     04/02/2024    CO2 30.0 04/02/2024     Lab Results   Component Value Date    CHOLEST 251 (H) 04/02/2024    TRIG 45 04/02/2024    HDL 70 (H) 04/02/2024     (H) 04/02/2024    VLDL 9 04/02/2024    NONHDLC 181 (H) 04/02/2024     Lab Results   Component Value Date    MALBP 1.00 04/02/2024    CREUR 76.30 04/02/2024         ASSESSMENT/PLAN:    This is a 63 year-old woman here for evaluation and management of uncontrolled type 2 diabetes. We discussed the ABCs of DM.     1.) Hyperglycemia Management- We discussed the importance of glycemic control to prevent complications of diabetes. We discussed the importance of SBGM. I offered and provided patient education materials and offered a blood glucose log book.   - Continue Januvia 100mg PO qAM  - Start glipizide 5mg PO  - Start checking blood sugars at least once daily at different times of the day    2.) Management of Diabetic Complications- We discussed the complications of diabetes include retinopathy, neuropathy, nephropathy and cardiovascular disease.   - Ophtho- none, up to date  - Flu and Covid vaccine- up to date  - BP- at goal, not on meds  - Lipids- will check in 3 months   - MAC- will check in 3 months  - CMP- will check in 3 months  -  Neuropathy- she has this and is on gabapentin  - CAD- none    3.) Lifestyle Management for Diabetes- We discussed importance of a low CHO diet, and recommend 45gm per meal or 135gm per day. We discussed the importance of trying to follow a Mediterranean diet, with an emphasis on vegetables at every meal, with lots whole grains, and protein from either plant-based sources, or poultry and fish.   - Diet- she is going to work on this  - Exercise- she is going to work on this    4.) Hypothyroidism- she is on 88mcg levothyroxine, check TSH and FT4 in 3 months    5.) Vitamin D Deficiency- start ergocalciferol 50,000 once weekly for 3 months and check vitamin D level    Return to clinic in 3 months    Prior to this encounter, I spent over 15 minutes with preparing for the visit, including reviewing documents from other specialties as well as from PCP and going over test results. During the face to face encounter, I spent an additional 15 minutes which were determined for follow-up. Greater than 50% of the time was spent in counseling, anticipatory guidance, and coordination of care. Patient concerns were answered to the best of my knowledge.         5/16/24  Dasia Gurrola MD

## 2024-05-23 DIAGNOSIS — R05.9 COUGH: ICD-10-CM

## 2024-05-23 RX ORDER — ALBUTEROL SULFATE 90 UG/1
2 AEROSOL, METERED RESPIRATORY (INHALATION) EVERY 4 HOURS PRN
Qty: 8.5 G | Refills: 2 | Status: SHIPPED | OUTPATIENT
Start: 2024-05-23

## 2024-05-23 RX ORDER — AZELASTINE HYDROCHLORIDE 0.5 MG/ML
1 SOLUTION/ DROPS OPHTHALMIC 2 TIMES DAILY
Qty: 18 ML | Refills: 0 | Status: SHIPPED | OUTPATIENT
Start: 2024-05-23

## 2024-05-23 NOTE — TELEPHONE ENCOUNTER
Patient contacted via telephone and informed that Dr. Rivera sent refills for Albuterol and Azelastine eye drops to [facility] in [place].     She was informed Dr. Rivera agreed with Urgent Care evaluation.

## 2024-05-23 NOTE — TELEPHONE ENCOUNTER
Call reviewed and noted.  Agree with triage advice provided.  Medications refilled as requested.  If worsening symptoms or persistent symptoms recommend urgent care evaluation.

## 2024-05-23 NOTE — TELEPHONE ENCOUNTER
Patient is requesting a refill on her eye drops (patient did not have the name of it) and albuterol. Per the patient she is out of medications. Pharmacy: [facility]/ALEJANDRA Clifford (listed)     Current Outpatient Medications   Medication Sig Dispense Refill    ALBUTEROL 108 (90 Base) MCG/ACT Inhalation Aero Soln INHALE 2 PUFFS INTO THE LUNGS EVERY 4 (FOUR) HOURS AS NEEDED FOR WHEEZING. 8.5 g 2

## 2024-05-23 NOTE — TELEPHONE ENCOUNTER
TRIAGE)    Assessment)    Patient contacted via telephone.  Patient reports that she contracted a respiratory virus from her daughter and grandson about 1 week prior. She complains of burning eyes.    Patient is coughing while on phone (productive). She ran out of Albuterol several days ago.     She is able to speak in complete sentences over the telephone.     She denies febrile illness    Patient offers that she is producing green sputum from chest and nasal drainage is yellow.     She states before she ran out of Albuterol and before she became ill she was using Albuterol on average once weekly.    Meds)    Patient ran out of Symbicort and states that she wants to wait until her June appointment with Dr. Rivera before it is refilled.     Singulair 10 mg daily    Levocetirizine 5 mg daily.     Plan)    Patient asked to present to Urgent Care for Evaluation and Treatment.     Patient informed that she should have a respiratory panel test completed (possible Covid or RSV infection).     Patient informed Dr. Rivera will address and nurse will call back with his advice.     Patient agreed to present to Urgent Care.       Patient last seen in Allergy for physician visit on 1/30/2023 for . . .    History of copd  (primary encounter diagnosis)  Perennial allergic rhinitis with seasonal variation  Former smoker  Flu vaccine need  Covid-19 vaccine series completed    Requested Prescriptions   Pending Prescriptions Disp Refills    albuterol 108 (90 Base) MCG/ACT Inhalation Aero Soln 8.5 g 2     Sig: Inhale 2 puffs into the lungs every 4 (four) hours as needed for Wheezing.       Rescue Inhalers Failed - 5/23/2024  2:13 PM        Failed - Pass - Pending Nurse Triage Call        Passed - Appt in past 6 mos or next 3 mos     Recent Outpatient Visits              1 week ago Type 2 diabetes mellitus with other specified complication, without long-term current use of insulin ([facility])    [facility], [facility],  Dasia Mcdowell MD    Office Visit    3 months ago Flank pain    [facility] Daisy Lockwood, DEVIKA    Office Visit    7 months ago Type 2 diabetes mellitus with other specified complication, without long-term current use of insulin ([facility])    [facility], Dasia Riddle Sudha K, MD    Office Visit    1 year ago Pulmonary emphysema, unspecified emphysema type ([facility])    [facility], Dasia Riddle Laura, MD    Office Visit    1 year ago Chest pressure    [facility], Dasia Riddle Laura, MD    Office Visit          Future Appointments         Provider Department Appt Notes    In 3 weeks Kevin Rivera MD [facility], [facility] f/u allergy                    Passed - Last Refill > 30 days     Last rescue inhaler refill:                       Azelastine HCl 0.05 % Ophthalmic Solution 18 mL 0     Sig: Apply 1 drop to eye 2 (two) times daily.       Antihistamines Passed - 5/23/2024  2:13 PM        Passed - Appt in past 12 mos or next 1 mos     Recent Outpatient Visits              1 week ago Type 2 diabetes mellitus with other specified complication, without long-term current use of insulin ([facility])    [facility], Dasia Riddle Sudha K, MD    Office Visit    3 months ago Flank pain    [facility] Daisy Lockwood, APRANNA    Office Visit    7 months ago Type 2 diabetes mellitus with other specified complication, without long-term current use of insulin ([facility])    [facility], Sudha K, MD    Office Visit    1 year ago Pulmonary emphysema, unspecified emphysema type ([facility])    [facility] Vetrone[facility] Laura, MD    Office Visit    1 year ago Chest  pressure    [facility]    Office Visit          Future Appointments         Provider Department Appt Notes    In 3 weeks Kevin Rivera MD [facility], [facility] f/u allergy

## 2024-06-24 ENCOUNTER — NURSE TRIAGE (OUTPATIENT)
Dept: INTERNAL MEDICINE CLINIC | Facility: CLINIC | Age: 64
End: 2024-06-24

## 2024-06-24 ENCOUNTER — OFFICE VISIT (OUTPATIENT)
Dept: INTERNAL MEDICINE CLINIC | Facility: CLINIC | Age: 64
End: 2024-06-24

## 2024-06-24 VITALS
DIASTOLIC BLOOD PRESSURE: 66 MMHG | OXYGEN SATURATION: 99 % | SYSTOLIC BLOOD PRESSURE: 105 MMHG | RESPIRATION RATE: 17 BRPM | HEIGHT: 66 IN | BODY MASS INDEX: 20.89 KG/M2 | TEMPERATURE: 98 F | HEART RATE: 60 BPM | WEIGHT: 130 LBS

## 2024-06-24 DIAGNOSIS — R11.0 CHRONIC NAUSEA: ICD-10-CM

## 2024-06-24 DIAGNOSIS — R42 DIZZY SPELLS: ICD-10-CM

## 2024-06-24 DIAGNOSIS — R09.81 SINUS CONGESTION: ICD-10-CM

## 2024-06-24 DIAGNOSIS — R05.1 ACUTE COUGH: Primary | ICD-10-CM

## 2024-06-24 DIAGNOSIS — R06.2 WHEEZING: ICD-10-CM

## 2024-06-24 DIAGNOSIS — U07.1 COVID-19 VIRUS INFECTION: ICD-10-CM

## 2024-06-24 DIAGNOSIS — J44.1 COPD EXACERBATION (HCC): ICD-10-CM

## 2024-06-24 LAB
COVID19 BINAX NOW ANTIGEN: DETECTED
OPERATOR ID: ABNORMAL
POCT LOT NUMBER: ABNORMAL

## 2024-06-24 PROCEDURE — 99214 OFFICE O/P EST MOD 30 MIN: CPT | Performed by: INTERNAL MEDICINE

## 2024-06-24 RX ORDER — FLUTICASONE PROPIONATE 50 MCG
2 SPRAY, SUSPENSION (ML) NASAL DAILY
Qty: 48 ML | Refills: 2 | Status: SHIPPED | OUTPATIENT
Start: 2024-06-24

## 2024-06-24 RX ORDER — MONTELUKAST SODIUM 10 MG/1
10 TABLET ORAL EVERY EVENING
Qty: 90 TABLET | Refills: 1 | Status: SHIPPED | OUTPATIENT
Start: 2024-06-24

## 2024-06-24 RX ORDER — AZITHROMYCIN 250 MG/1
TABLET, FILM COATED ORAL
Qty: 6 TABLET | Refills: 0 | Status: SHIPPED | OUTPATIENT
Start: 2024-06-24 | End: 2024-06-28

## 2024-06-24 RX ORDER — LEVOCETIRIZINE DIHYDROCHLORIDE 5 MG/1
5 TABLET, FILM COATED ORAL NIGHTLY
Qty: 90 TABLET | Refills: 0 | Status: SHIPPED | OUTPATIENT
Start: 2024-06-24

## 2024-06-24 RX ORDER — PREDNISONE 5 MG/1
5 TABLET ORAL 2 TIMES DAILY
Qty: 10 TABLET | Refills: 0 | Status: SHIPPED | OUTPATIENT
Start: 2024-06-24 | End: 2024-06-29

## 2024-06-24 RX ORDER — PROCHLORPERAZINE MALEATE 5 MG/1
5 TABLET ORAL NIGHTLY PRN
Qty: 30 TABLET | Refills: 1 | Status: SHIPPED | OUTPATIENT
Start: 2024-06-24

## 2024-06-24 NOTE — TELEPHONE ENCOUNTER
Action Requested: Summary for Provider     []  Critical Lab, Recommendations Needed  [] Need Additional Advice  []   FYI    []   Need Orders  [] Need Medications Sent to Pharmacy  []  Other     SUMMARY:   Patient reports feeling ill since 2024, has cough but does not think she has fever.  Had random shooting pains and leg cramps 2024.  Has nasal congestion.  Today feels dizziness, denies headache or room spinning.  Just drove herself to work in [place].  Feels a little hard to stand up due to pains at top of legs.  Denies weakness on one side of body.  Denies stroke symptoms.  Plans to have her daughter drive her home from work this afternoon.  She scheduled appointment at 6:45 pm.  Advised to don face mask upon entry to Clinic.  Advised to go to Immediate Care if symptoms worsen prior to appointment.  Stay hydrated, change position slowly to avoid triggering dizziness.    Future Appointments   Date Time Provider Department Center   2024  6:45 PM Franck Vargas MD [facility]   10/9/2024  1:45 PM Kevin Rivera MD [facility]     Reason for call: Acute  Cough, dizziness  Onset: Cough , dizziness today    Spoke with patient,  verified.       Reason for Disposition   Lightheadedness (dizziness) present now, after 2 hours of rest and fluids    Protocols used: Dizziness-A-OH

## 2024-06-25 ENCOUNTER — TELEPHONE (OUTPATIENT)
Dept: INTERNAL MEDICINE CLINIC | Facility: CLINIC | Age: 64
End: 2024-06-25

## 2024-06-25 NOTE — TELEPHONE ENCOUNTER
Patient called and is requesting if she can have a copy of her covid test that shows positive to show to her physical therapist because she had an appointment today, she is asking for it to be mailed to her.     [address] [place] [address]

## 2024-06-25 NOTE — PROGRESS NOTES
Subjective:     Patient ID: Genny Mc is a 63 year old female.    HPI patient comes in with complaint of cough congestion wheezing for 4 days some chills and sweating patient is a smoker no fever also has been having some dizzy spells and nausea..      History/Other:   Review of Systems   Constitutional:  Positive for chills and fatigue.   HENT:  Positive for congestion.    Eyes: Negative.    Respiratory:  Positive for cough.    Cardiovascular: Negative.    Gastrointestinal: Negative.    Genitourinary: Negative.    Musculoskeletal: Negative.    Skin: Negative.    Neurological:  Positive for dizziness.   Psychiatric/Behavioral: Negative.       Current Outpatient Medications   Medication Sig Dispense Refill    fluticasone propionate 50 MCG/ACT Nasal Suspension 2 sprays by Nasal route daily. 48 mL 2    hydrocortisone 2.5 % External Cream Apply bid to involved areas as needed 60 g 0    levocetirizine 5 MG Oral Tab Take 1 tablet (5 mg total) by mouth nightly. 90 tablet 0    montelukast 10 MG Oral Tab Take 1 tablet (10 mg total) by mouth every evening. 90 tablet 1    prochlorperazine 5 MG Oral Take 1 tablet (5 mg total) by mouth nightly as needed for Nausea. 30 tablet 1    nirmatrelvir-ritonavir 300-100 MG Oral Tablet Therapy Pack Take two nirmatrelvir tablets (300mg) with one ritonavir tablet (100mg) together twice daily for 5 days. 30 tablet 0    azithromycin (ZITHROMAX Z-AMEENA) 250 MG Oral Tab Take 2 tablets (500 mg total) by mouth daily for 1 day, THEN 1 tablet (250 mg total) daily for 4 days. 6 tablet 0    predniSONE 5 MG Oral Tab Take 1 tablet (5 mg total) by mouth 2 (two) times daily for 5 days. 10 tablet 0    albuterol 108 (90 Base) MCG/ACT Inhalation Aero Soln Inhale 2 puffs into the lungs every 4 (four) hours as needed for Wheezing. 8.5 g 2    Azelastine HCl 0.05 % Ophthalmic Solution Apply 1 drop to eye 2 (two) times daily. 18 mL 0    SITagliptin Phosphate (JANUVIA) 100 MG Oral Tab Take 1 tablet (100 mg  total) by mouth daily. 90 tablet 1    levothyroxine 88 MCG Oral Tab Take 1 tablet (88 mcg total) by mouth before breakfast. 90 tablet 0    Lancets (ONETOUCH DELICA PLUS [id number]) Does not apply Misc 1 each by In Vitro route 3 (three) times daily. Use test strip to check blood sugar 3 times per day 300 each 0    Glucose Blood (ONETOUCH VERIO) In Vitro Strip Use test strips to check blood sugar 3 times per day. 300 strip 0    SYMBICORT 80-4.5 MCG/ACT Inhalation Aerosol INHALE 2 PUFFS INTO THE LUNGS 2 (TWO) TIMES DAILY. 10.2 g 2    FLUoxetine HCl 40 MG Oral Cap Take 1 capsule (40 mg total) by mouth daily. Take along with 20 mg for a total dose of 60 mg 90 capsule 3    ergocalciferol 1.25 MG (81178 UT) Oral Cap Take 1 capsule (50,000 Units total) by mouth once a week. 12 capsule 1    Glucose Blood (ONETOUCH VERIO) In Vitro Strip TEST BLOOD SUGAR THREE TIMES DAILY AS NEEDED 300 strip 11    atorvastatin 10 MG Oral Tab Take 1 tablet (10 mg total) by mouth every other day. 90 tablet 1    gabapentin 800 MG Oral Tab Take 1 tablet (800 mg total) by mouth 3 (three) times daily.      zolpidem 10 MG Oral Tab Take 1 tablet (10 mg total) by mouth nightly as needed. AT BEDTIME      NARCAN 4 MG/0.1ML Nasal Liquid       EZETIMIBE 10 MG Oral Tab TAKE 1 TABLET (10 MG TOTAL) BY MOUTH NIGHTLY. 90 tablet 0    ondansetron 4 MG Oral Tablet Dispersible Take 1-2 tablets (4-8 mg total) by mouth 2 (two) times daily as needed for Nausea. 60 tablet 11    clonazePAM 0.5 MG Oral Tab Take 1 tablet (0.5 mg total) by mouth nightly as needed.      hydrOXYzine Pamoate 25 MG Oral Cap Take 1 capsule (25 mg total) by mouth nightly as needed (allergies). 30 capsule 5    Blood Glucose Monitoring Suppl Supplies Does not apply Misc Please supply patient with glucose meter, test strips and lancets covered by her insurance, test TID 1 kit 0     Allergies:  Allergies   Allergen Reactions    Radiology Contrast Iodinated Dyes HIVES, SWELLING and SHORTNESS OF BREATH      Had some kind of contrast many years ago with an x-ray.     Other NAUSEA ONLY     PO antibiotics       Past Medical History:    Calculus of kidney    COPD (chronic obstructive pulmonary disease) (HCC)    Diabetes (HCC)    Disorder of thyroid    High cholesterol    Kidney stones    PONV (postoperative nausea and vomiting)    Rotator cuff impingement syndrome of right shoulder      Past Surgical History:   Procedure Laterality Date    Appendectomy      Appendectomy      Blepharoplasty upper skin only - od - right eye Right     [facility]     Blepharoplasty upper skin only - os - left eye Left     [facility] [place]     Colonoscopy N/A 2018    Procedure: COLONOSCOPY;  Surgeon: Juan Marcus MD;  Location: [facility] ENDOSCOPY    Egd      Lithotripsy      Tonsillectomy        Family History   Problem Relation Age of Onset    Heart Disease Father         Details unknown    Hypertension Father     Colon Cancer Maternal Grandmother     Other (Kidney Cancer) Maternal Grandmother     Colon Cancer Other         Nephew    Heart Disease Mother         Details unknown    Hypertension Brother     Colon Polyps Brother     Colon Cancer Cousin     Other (Other) Paternal Aunt         crohns disease,dementia    Diabetes Neg     Glaucoma Neg     Macular degeneration Neg       Social History:   Social History     Socioeconomic History    Marital status:    Tobacco Use    Smoking status: Some Days     Current packs/day: 0.00     Types: Cigarettes     Start date: 1973     Last attempt to quit: 2018     Years since quittin.6    Smokeless tobacco: Never    Tobacco comments:     quit then starts smoking someday   Vaping Use    Vaping status: Never Used   Substance and Sexual Activity    Alcohol use: Never    Drug use: No    Sexual activity: Not Currently   Other Topics Concern    Caffeine Concern Yes     Comment: coffee, 1cup/day    Exercise Yes   Social History Narrative    The  patient does not use an assistive device..      The patient does live in a home with stairs.     Social Determinants of Health      Received from [facility]        Objective:   Physical Exam  Vitals and nursing note reviewed.   Constitutional:       Appearance: She is well-developed.   HENT:      Head: Normocephalic and atraumatic.      Right Ear: External ear normal.      Left Ear: External ear normal.      Nose: Nose normal.   Eyes:      Conjunctiva/sclera: Conjunctivae normal.      Pupils: Pupils are equal, round, and reactive to light.   Cardiovascular:      Rate and Rhythm: Normal rate and regular rhythm.      Heart sounds: Normal heart sounds.   Pulmonary:      Effort: Pulmonary effort is normal.      Breath sounds: Wheezing present.   Abdominal:      General: Bowel sounds are normal.      Palpations: Abdomen is soft.   Genitourinary:     Vagina: Normal.   Musculoskeletal:         General: Normal range of motion.      Cervical back: Normal range of motion and neck supple.   Skin:     General: Skin is warm and dry.   Neurological:      Mental Status: She is alert and oriented to person, place, and time.      Deep Tendon Reflexes: Reflexes are normal and symmetric.   Psychiatric:         Behavior: Behavior normal.         Thought Content: Thought content normal.         Judgment: Judgment normal.         Assessment & Plan:   1. Acute cough give cough medicine take as needed   2. Sinus congestion as needed nasal spray   3. Wheezing will add steroids use inhaler   4. COPD exacerbation (HCC) will add Z-Mat also   5. COVID-19 virus infection will treat with Paxlovid monitor symptoms any worsening symptoms chest pain shortness of breath need to go to ER   6. Chronic nausea use Compazine as needed   7. Dizzy spells due to above drink plenty of fluid rest any worsening symptoms let us know       Orders Placed This Encounter   Procedures    POC COVID19 BinaxNOW Antigen       Meds This Visit:  Requested  Prescriptions     Signed Prescriptions Disp Refills    fluticasone propionate 50 MCG/ACT Nasal Suspension 48 mL 2     Si sprays by Nasal route daily.    hydrocortisone 2.5 % External Cream 60 g 0     Sig: Apply bid to involved areas as needed    levocetirizine 5 MG Oral Tab 90 tablet 0     Sig: Take 1 tablet (5 mg total) by mouth nightly.    montelukast 10 MG Oral Tab 90 tablet 1     Sig: Take 1 tablet (10 mg total) by mouth every evening.    prochlorperazine 5 MG Oral 30 tablet 1     Sig: Take 1 tablet (5 mg total) by mouth nightly as needed for Nausea.    nirmatrelvir-ritonavir 300-100 MG Oral Tablet Therapy Pack 30 tablet 0     Sig: Take two nirmatrelvir tablets (300mg) with one ritonavir tablet (100mg) together twice daily for 5 days.    azithromycin (ZITHROMAX Z-AMEENA) 250 MG Oral Tab 6 tablet 0     Sig: Take 2 tablets (500 mg total) by mouth daily for 1 day, THEN 1 tablet (250 mg total) daily for 4 days.    predniSONE 5 MG Oral Tab 10 tablet 0     Sig: Take 1 tablet (5 mg total) by mouth 2 (two) times daily for 5 days.       Imaging & Referrals:  None

## 2024-07-05 ENCOUNTER — TELEPHONE (OUTPATIENT)
Dept: INTERNAL MEDICINE CLINIC | Facility: CLINIC | Age: 64
End: 2024-07-05

## 2024-07-05 NOTE — TELEPHONE ENCOUNTER
Patient called and is requesting if she can have a copy of her covid test that shows positive to show to her physical therapist because she had an appointment today, she is asking for it to be mailed to her.      [address] [place] [address]     See telephone encounter 6/25, patient states she never received it in the mail. Address confirmed.

## 2024-07-10 DIAGNOSIS — E11.69 TYPE 2 DIABETES MELLITUS WITH OTHER SPECIFIED COMPLICATION, WITHOUT LONG-TERM CURRENT USE OF INSULIN (HCC): ICD-10-CM

## 2024-07-10 DIAGNOSIS — E78.00 HYPERCHOLESTEROLEMIA: ICD-10-CM

## 2024-07-10 NOTE — TELEPHONE ENCOUNTER
Advance refill approval request    Current Outpatient Medications   Medication Sig Dispense Refill                                                                                                             atorvastatin 10 MG Oral Tab Take 1 tablet (10 mg total) by mouth every other day. 90 tablet 1

## 2024-07-11 RX ORDER — ATORVASTATIN CALCIUM 10 MG/1
10 TABLET, FILM COATED ORAL EVERY OTHER DAY
Qty: 90 TABLET | Refills: 1 | Status: CANCELLED | OUTPATIENT
Start: 2024-07-11

## 2024-07-11 NOTE — TELEPHONE ENCOUNTER
Endocrine Refill protocol for oral antihypertensive medications    Protocol Criteria: pass    -Appointment with Endocrinology completed in the last 6 months or scheduled in the next 3 months    -BMP or CMP has been completed in the last 12 months     -GFR is greater than or equal to 50    Verify the above has been completed or scheduled in the appropriate timeline. If so can send a 90 day supply with 1 refill.   Verify BMP or CMP has been completed in last year  Verify last GFR result     Last completed office visit:5/16/2024 Dasia Gurrola MD   Next scheduled Follow up:   Future Appointments   Date Time Provider Department Center   10/9/2024  1:45 PM Kevin Rivera MD [facility]      Last BMP or CMP completion date:  Lab Results   Component Value Date    GFRAA 87 02/09/2022    GFRNAA 75 02/09/2022    EGFRCR 76 04/02/2024

## 2024-07-16 ENCOUNTER — TELEPHONE (OUTPATIENT)
Dept: ENDOCRINOLOGY CLINIC | Facility: CLINIC | Age: 64
End: 2024-07-16

## 2024-07-16 DIAGNOSIS — E03.9 ACQUIRED HYPOTHYROIDISM: ICD-10-CM

## 2024-07-16 RX ORDER — ATORVASTATIN CALCIUM 10 MG/1
10 TABLET, FILM COATED ORAL EVERY OTHER DAY
Qty: 90 TABLET | Refills: 1 | Status: SHIPPED | OUTPATIENT
Start: 2024-07-16

## 2024-07-16 NOTE — TELEPHONE ENCOUNTER
Current Outpatient Medications   Medication Sig Dispense Refill                                                                   levothyroxine 88 MCG Oral Tab Take 1 tablet (88 mcg total) by mouth before breakfast. 90 tablet 0

## 2024-07-17 RX ORDER — LEVOTHYROXINE SODIUM 88 UG/1
88 TABLET ORAL
Qty: 90 TABLET | Refills: 0 | Status: SHIPPED | OUTPATIENT
Start: 2024-07-17

## 2024-07-17 NOTE — TELEPHONE ENCOUNTER
Endocrine refill protocol for medications for hypothyroidism and hyperthyroidism    Protocol Criteria:  Appointment with Endocrinology completed in the last 12 months or scheduled in the next 6 months     Verify appointment has been completed or scheduled in the appropriate timeline. If so can send a 90 day supply with 1 refill per provider protocol.    Normal TSH result in the past 12 months   Review recent telephone encounters and mychart communications with patient to ensure a dose change has not occurred since last office visit that was not updated in the medication history list   Last completed office visit:5/16/2024 Dasia Gurrola MD   Next scheduled Follow up:   Future Appointments   Date Time Provider Department Center   10/9/2024  1:45 PM Kevin Rivera MD [facility]      Last TSH result:   TSH   Date Value Ref Range Status   04/02/2024 0.871 0.550 - 4.780 mIU/mL Final   03/07/2012 0.275 (L) 0.350 - 5.500 mIU/mL Final

## 2024-08-10 NOTE — TELEPHONE ENCOUNTER
Endocrine Refill protocol for Ergocalciferol     Protocol Criteria: PASSED    Appointment with Endocrinology completed in the last 6 months or scheduled in the next 3 months     Verify appointment has been completed or scheduled in the appropriate timeline. If so can send a 90 day supply with 1 refill.   Vitamin D level must have been completed within the last 6 months  Vitamin D level must be within the normal range     Vitamin D, 25OH, Total (ng/mL)   Date Value   04/02/2024 22.8 (L)    (32.0-100.0)    Last completed office visit:5/16/2024 Dasia Gurrola MD     Next scheduled Follow up: No appointment scheduled - Called patient to help schedule a follow up appointment and to inform them that labs are to be drawn, no answer, left detailed message to call back.

## 2024-08-12 ENCOUNTER — OFFICE VISIT (OUTPATIENT)
Dept: INTERNAL MEDICINE CLINIC | Facility: CLINIC | Age: 64
End: 2024-08-12

## 2024-08-12 VITALS
OXYGEN SATURATION: 99 % | BODY MASS INDEX: 20.89 KG/M2 | HEART RATE: 67 BPM | TEMPERATURE: 98 F | RESPIRATION RATE: 17 BRPM | HEIGHT: 66 IN | WEIGHT: 130 LBS | DIASTOLIC BLOOD PRESSURE: 63 MMHG | SYSTOLIC BLOOD PRESSURE: 115 MMHG

## 2024-08-12 DIAGNOSIS — Z71.6 ENCOUNTER FOR SMOKING CESSATION COUNSELING: ICD-10-CM

## 2024-08-12 DIAGNOSIS — Z78.0 POST-MENOPAUSAL: ICD-10-CM

## 2024-08-12 DIAGNOSIS — R00.2 PALPITATIONS: ICD-10-CM

## 2024-08-12 DIAGNOSIS — Z00.00 ENCOUNTER FOR PREVENTIVE CARE: ICD-10-CM

## 2024-08-12 DIAGNOSIS — R55 PRE-SYNCOPE: ICD-10-CM

## 2024-08-12 DIAGNOSIS — Z87.891 SMOKING HISTORY: ICD-10-CM

## 2024-08-12 DIAGNOSIS — Z12.31 SCREENING MAMMOGRAM FOR BREAST CANCER: ICD-10-CM

## 2024-08-12 DIAGNOSIS — E11.9 TYPE 2 DIABETES MELLITUS WITHOUT COMPLICATION, WITHOUT LONG-TERM CURRENT USE OF INSULIN (HCC): ICD-10-CM

## 2024-08-12 DIAGNOSIS — R42 DIZZY SPELLS: Primary | ICD-10-CM

## 2024-08-12 PROCEDURE — G0009 ADMIN PNEUMOCOCCAL VACCINE: HCPCS | Performed by: INTERNAL MEDICINE

## 2024-08-12 PROCEDURE — 99214 OFFICE O/P EST MOD 30 MIN: CPT | Performed by: INTERNAL MEDICINE

## 2024-08-12 PROCEDURE — 90677 PCV20 VACCINE IM: CPT | Performed by: INTERNAL MEDICINE

## 2024-08-12 RX ORDER — NICOTINE 21 MG/24HR
1 PATCH, TRANSDERMAL 24 HOURS TRANSDERMAL EVERY 24 HOURS
Qty: 30 PATCH | Refills: 2 | Status: SHIPPED | OUTPATIENT
Start: 2024-08-12

## 2024-08-13 NOTE — TELEPHONE ENCOUNTER
ergocalciferol 1.25 MG (86455 UT) Oral Cap, Take 1 capsule (50,000 Units total) by mouth once a week., Disp: 12 capsule, Rfl: 1

## 2024-08-18 NOTE — PROGRESS NOTES
Subjective:     Patient ID: Genny Mc is a 64 year old female.    HPI  Patient comes in today for follow-up with complaint of having dizzy spells as per patient has been going on for years but lately is gotten worse but she says dispelling can happen but the disease has can happen anytime she does feel palpitation with this happen sometimes feels like she is going to pass out so this is can a new patient continues to smoke she would like to quit she has not been seen for some time she is due for annual physical she will come in  History/Other:   Review of Systems   Constitutional: Negative.    HENT: Negative.     Eyes: Negative.    Respiratory: Negative.     Cardiovascular:  Positive for palpitations. Negative for chest pain.   Gastrointestinal: Negative.    Genitourinary: Negative.    Musculoskeletal: Negative.    Skin: Negative.    Neurological:  Positive for dizziness.   Psychiatric/Behavioral: Negative.       Current Outpatient Medications   Medication Sig Dispense Refill    nicotine 21 MG/24HR Transdermal Patch 24 Hr Place 1 patch onto the skin daily. 30 patch 2    levothyroxine 88 MCG Oral Tab Take 1 tablet (88 mcg total) by mouth before breakfast. 90 tablet 0    atorvastatin 10 MG Oral Tab Take 1 tablet (10 mg total) by mouth every other day. 90 tablet 1    fluticasone propionate 50 MCG/ACT Nasal Suspension 2 sprays by Nasal route daily. 48 mL 2    hydrocortisone 2.5 % External Cream Apply bid to involved areas as needed 60 g 0    levocetirizine 5 MG Oral Tab Take 1 tablet (5 mg total) by mouth nightly. 90 tablet 0    montelukast 10 MG Oral Tab Take 1 tablet (10 mg total) by mouth every evening. 90 tablet 1    prochlorperazine 5 MG Oral Take 1 tablet (5 mg total) by mouth nightly as needed for Nausea. 30 tablet 1    albuterol 108 (90 Base) MCG/ACT Inhalation Aero Soln Inhale 2 puffs into the lungs every 4 (four) hours as needed for Wheezing. 8.5 g 2    Azelastine HCl 0.05 % Ophthalmic Solution Apply 1  drop to eye 2 (two) times daily. 18 mL 0    SITagliptin Phosphate (JANUVIA) 100 MG Oral Tab Take 1 tablet (100 mg total) by mouth daily. 90 tablet 1    Lancets (ONETOUCH DELICA PLUS [id number]) Does not apply Misc 1 each by In Vitro route 3 (three) times daily. Use test strip to check blood sugar 3 times per day 300 each 0    Glucose Blood (ONETOUCH VERIO) In Vitro Strip Use test strips to check blood sugar 3 times per day. 300 strip 0    SYMBICORT 80-4.5 MCG/ACT Inhalation Aerosol INHALE 2 PUFFS INTO THE LUNGS 2 (TWO) TIMES DAILY. 10.2 g 2    FLUoxetine HCl 40 MG Oral Cap Take 1 capsule (40 mg total) by mouth daily. Take along with 20 mg for a total dose of 60 mg 90 capsule 3    ergocalciferol 1.25 MG (33111 UT) Oral Cap Take 1 capsule (50,000 Units total) by mouth once a week. 12 capsule 1    Glucose Blood (ONETOUCH VERIO) In Vitro Strip TEST BLOOD SUGAR THREE TIMES DAILY AS NEEDED 300 strip 11    gabapentin 800 MG Oral Tab Take 1 tablet (800 mg total) by mouth 3 (three) times daily.      zolpidem 10 MG Oral Tab Take 1 tablet (10 mg total) by mouth nightly as needed. AT BEDTIME      NARCAN 4 MG/0.1ML Nasal Liquid       EZETIMIBE 10 MG Oral Tab TAKE 1 TABLET (10 MG TOTAL) BY MOUTH NIGHTLY. 90 tablet 0    ondansetron 4 MG Oral Tablet Dispersible Take 1-2 tablets (4-8 mg total) by mouth 2 (two) times daily as needed for Nausea. 60 tablet 11    clonazePAM 0.5 MG Oral Tab Take 1 tablet (0.5 mg total) by mouth nightly as needed.      hydrOXYzine Pamoate 25 MG Oral Cap Take 1 capsule (25 mg total) by mouth nightly as needed (allergies). 30 capsule 5    Blood Glucose Monitoring Suppl Supplies Does not apply Misc Please supply patient with glucose meter, test strips and lancets covered by her insurance, test TID 1 kit 0     Allergies:  Allergies   Allergen Reactions    Radiology Contrast Iodinated Dyes HIVES, SWELLING and SHORTNESS OF BREATH     Had some kind of contrast many years ago with an x-ray.     Other NAUSEA ONLY      PO antibiotics       Past Medical History:    Calculus of kidney    COPD (chronic obstructive pulmonary disease) (HCC)    Diabetes (HCC)    Disorder of thyroid    High cholesterol    Kidney stones    PONV (postoperative nausea and vomiting)    Rotator cuff impingement syndrome of right shoulder      Past Surgical History:   Procedure Laterality Date    Appendectomy      Appendectomy      Blepharoplasty upper skin only - od - right eye Right     [facility]     Blepharoplasty upper skin only - os - left eye Left     [facility]     Colonoscopy N/A 2018    Procedure: COLONOSCOPY;  Surgeon: Juan Marcus MD;  Location: [facility] ENDOSCOPY    Egd  2018    Lithotripsy      Tonsillectomy        Family History   Problem Relation Age of Onset    Heart Disease Father         Details unknown    Hypertension Father     Colon Cancer Maternal Grandmother     Other (Kidney Cancer) Maternal Grandmother     Colon Cancer Other         Nephew    Heart Disease Mother         Details unknown    Hypertension Brother     Colon Polyps Brother     Colon Cancer Cousin     Other (Other) Paternal Aunt         crohns disease,dementia    Diabetes Neg     Glaucoma Neg     Macular degeneration Neg       Social History:   Social History     Socioeconomic History    Marital status:    Tobacco Use    Smoking status: Some Days     Current packs/day: 0.00     Types: Cigarettes     Start date: 1973     Last attempt to quit: 2018     Years since quittin.7    Smokeless tobacco: Never    Tobacco comments:     quit then starts smoking someday   Vaping Use    Vaping status: Never Used   Substance and Sexual Activity    Alcohol use: Never    Drug use: No    Sexual activity: Not Currently   Other Topics Concern    Caffeine Concern Yes     Comment: coffee, 1cup/day    Exercise Yes   Social History Narrative    The patient does not use an assistive device..      The patient does live in a home with  stairs.     Social Determinants of Health      Received from [facility]        Objective:   Physical Exam  Vitals and nursing note reviewed.   Constitutional:       Appearance: She is well-developed.   HENT:      Head: Normocephalic and atraumatic.      Right Ear: External ear normal.      Left Ear: External ear normal.      Nose: Nose normal.   Eyes:      Conjunctiva/sclera: Conjunctivae normal.      Pupils: Pupils are equal, round, and reactive to light.   Cardiovascular:      Rate and Rhythm: Normal rate and regular rhythm.      Heart sounds: Normal heart sounds.   Pulmonary:      Effort: Pulmonary effort is normal.      Breath sounds: Normal breath sounds.   Abdominal:      General: Bowel sounds are normal.      Palpations: Abdomen is soft.   Genitourinary:     Vagina: Normal.   Musculoskeletal:         General: Normal range of motion.      Cervical back: Normal range of motion and neck supple.   Skin:     General: Skin is warm and dry.   Neurological:      Mental Status: She is alert and oriented to person, place, and time.      Deep Tendon Reflexes: Reflexes are normal and symmetric.   Psychiatric:         Behavior: Behavior normal.         Thought Content: Thought content normal.         Judgment: Judgment normal.         Assessment & Plan:   1. Dizzy spells ??  Or DBV with palpitations will order Holter monitor echo carotid Doppler   2. Palpitations as above we will follow results any worsening symptoms need to let us know   3. Smoking history we will order nicotine patches also use Nicorette gum as needed let us know if you need further help   4. Encounter for preventive care will refer to GI for screening colonoscopy   5. Screening mammogram for breast cancer we will order mammogram   6. Encounter for smoking cessation counseling as above we will order CT lungs   7. Post-menopausal will order bone density scan   8. Pre-syncope as above we will follow echo Holter and carotid Doppler   9.  Type 2 diabetes mellitus without complication, without long-term current use of insulin (HCC) will follow continue current treatment patient will come in for annual physical       Orders Placed This Encounter   Procedures    Prevnar 20 (PCV20) [05107]       Meds This Visit:  Requested Prescriptions     Signed Prescriptions Disp Refills    nicotine 21 MG/24HR Transdermal Patch 24 Hr 30 patch 2     Sig: Place 1 patch onto the skin daily.       Imaging & Referrals:  PCV20 VACCINE FOR INTRAMUSCULAR USE  GASTRO - INTERNAL  CARDIO - INTERNAL  OPHTHALMOLOGY - INTERNAL  CT LUNG LD SCREENING(CPT=71271)  XR DEXA BONE DENSITOMETRY (CPT=77080)  [facility] GABRIELLA 2D+3D SCREENING BILAT (CPT=77067/98775)  US CAROTID DOPPLER BILAT - DIAG IMG (CPT=93880)  CARD ECHO 2D DOPPLER (CPT=93306)  CARD ZIO EXTENDED MONITOR 3-7 DAYS (CPT=93242/57732)

## 2024-08-21 RX ORDER — ERGOCALCIFEROL 1.25 MG/1
50000 CAPSULE, LIQUID FILLED ORAL WEEKLY
Qty: 12 CAPSULE | Refills: 1 | Status: SHIPPED | OUTPATIENT
Start: 2024-08-21

## 2024-08-29 ENCOUNTER — HOSPITAL ENCOUNTER (OUTPATIENT)
Dept: CT IMAGING | Facility: HOSPITAL | Age: 64
Discharge: HOME | End: 2024-08-29
Attending: INTERNAL MEDICINE
Payer: MEDICARE

## 2024-08-29 DIAGNOSIS — Z87.891 SMOKING HISTORY: ICD-10-CM

## 2024-08-29 PROCEDURE — 71271 CT THORAX LUNG CANCER SCR C-: CPT | Performed by: INTERNAL MEDICINE

## 2024-09-03 NOTE — TELEPHONE ENCOUNTER
Endocrine Refill protocol for Ergocalciferol     Protocol Criteria: PASSED Reason: N/A  Appointment with Endocrinology completed in the last 6 months or scheduled in the next 3 months     Verify appointment has been completed or scheduled in the appropriate timeline. If so can send a 90 day supply with 1 refill.   Vitamin D level must have been completed within the last 6 months  Vitamin D level must be within the normal range     Vitamin D, 25OH, Total (ng/mL)   Date Value   04/02/2024 22.8 (L)    (32.0-100.0)    Last completed office visit:5/16/2024 Dasai Gurrola MD   Next scheduled Follow up: no future appt lmtcb

## 2024-09-06 RX ORDER — ERGOCALCIFEROL 1.25 MG/1
50000 CAPSULE, LIQUID FILLED ORAL
Qty: 6 CAPSULE | Refills: 0 | Status: SHIPPED | OUTPATIENT
Start: 2024-09-06 | End: 2024-10-21

## 2024-09-19 NOTE — TELEPHONE ENCOUNTER
Called and spoke to patient, appointment scheduled for first available in [facility] 10/21/24 (patient preference). Patient is aware to have lab work completed.

## 2024-10-09 DIAGNOSIS — E11.69 TYPE 2 DIABETES MELLITUS WITH OTHER SPECIFIED COMPLICATION, WITHOUT LONG-TERM CURRENT USE OF INSULIN (HCC): ICD-10-CM

## 2024-10-09 RX ORDER — SITAGLIPTIN 100 MG/1
100 TABLET, FILM COATED ORAL DAILY
Qty: 90 TABLET | Refills: 2 | Status: SHIPPED | OUTPATIENT
Start: 2024-10-09

## 2024-10-09 NOTE — TELEPHONE ENCOUNTER
Endocrine Refill protocol for oral and injectable diabetic medications    Protocol Criteria:  FAILED  Reason: Elevated A1C    If all below requirements are met, send a 90-day supply with 1 refill per provider protocol.    Verify appointment with Endocrinology completed in the last 6 months or scheduled in the next 3 months.  Verify A1C has been completed within the last 6 months and is below 8.5%     Last completed office visit: 5/16/2024 Dasia Gurrola MD   Next scheduled Follow up:   Future Appointments   Date Time Provider Department Center   10/21/2024  1:15 PM Dasia Gurrola MD [facility] [place]   1/22/2025  3:30 PM [facility]      Last A1c result: Last A1c value was 8.6% done 5/16/2024.

## 2024-10-10 RX ORDER — DILTIAZEM HYDROCHLORIDE 60 MG/1
TABLET, FILM COATED ORAL
Qty: 10.2 G | Refills: 2 | Status: SHIPPED | OUTPATIENT
Start: 2024-10-10

## 2024-10-10 NOTE — TELEPHONE ENCOUNTER
Refill requested for:   Name from pharmacy: SYMBICORT 80-4.5 AEROSOL          Will file in chart as: SYMBICORT 80-4.5 MCG/ACT Inhalation Aerosol    Sig: SHAKE BEFORE USE AND INHALE 2 PUFFS INTO THE LUNGS TWICE A DAY    Disp: 10.2 g    Refills: 2    Start: 10/9/2024    Class: Normal    Non-formulary    Last ordered: 1 year ago (9/23/2023) by Kevin Rivera MD    Last refill: 9/20/2024    Rx #: [id number]       Protocol Details Appt in past 6 mos or next 3 mos      To be filled at: [facility] [address] [phone], [phone]              Last office visit: 1/30/2023    Previously advised to follow up in: 6 months or sooner if needed    F/U currently scheduled? Yes on 1/22/2025     Date of last refill: 9/23/2023    ACTION: Refilled per protocol.

## 2024-10-11 ENCOUNTER — LAB ENCOUNTER (OUTPATIENT)
Dept: LAB | Facility: HOSPITAL | Age: 64
End: 2024-10-11
Attending: INTERNAL MEDICINE
Payer: MEDICARE

## 2024-10-11 DIAGNOSIS — E55.9 VITAMIN D DEFICIENCY: ICD-10-CM

## 2024-10-11 DIAGNOSIS — E78.00 HYPERCHOLESTEROLEMIA: ICD-10-CM

## 2024-10-11 DIAGNOSIS — E11.69 TYPE 2 DIABETES MELLITUS WITH OTHER SPECIFIED COMPLICATION, WITHOUT LONG-TERM CURRENT USE OF INSULIN (HCC): ICD-10-CM

## 2024-10-11 DIAGNOSIS — E03.9 ACQUIRED HYPOTHYROIDISM: ICD-10-CM

## 2024-10-11 LAB
ALBUMIN SERPL-MCNC: 4.7 G/DL (ref 3.2–4.8)
ALBUMIN/GLOB SERPL: 2 {RATIO} (ref 1–2)
ALP LIVER SERPL-CCNC: 58 U/L
ALT SERPL-CCNC: 10 U/L
ANION GAP SERPL CALC-SCNC: 3 MMOL/L (ref 0–18)
AST SERPL-CCNC: 15 U/L (ref ?–34)
BILIRUB SERPL-MCNC: 0.6 MG/DL (ref 0.2–1.1)
BUN BLD-MCNC: 15 MG/DL (ref 9–23)
BUN/CREAT SERPL: 17.2 (ref 10–20)
CALCIUM BLD-MCNC: 9.6 MG/DL (ref 8.7–10.4)
CHLORIDE SERPL-SCNC: 106 MMOL/L (ref 98–112)
CHOLEST SERPL-MCNC: 284 MG/DL (ref ?–200)
CO2 SERPL-SCNC: 31 MMOL/L (ref 21–32)
CREAT BLD-MCNC: 0.87 MG/DL
CREAT UR-SCNC: 157.4 MG/DL
EGFRCR SERPLBLD CKD-EPI 2021: 73 ML/MIN/1.73M2 (ref 60–?)
FASTING PATIENT LIPID ANSWER: YES
FASTING STATUS PATIENT QL REPORTED: YES
GLOBULIN PLAS-MCNC: 2.4 G/DL (ref 2–3.5)
GLUCOSE BLD-MCNC: 140 MG/DL (ref 70–99)
HDLC SERPL-MCNC: 74 MG/DL (ref 40–59)
LDLC SERPL CALC-MCNC: 200 MG/DL (ref ?–100)
MICROALBUMIN UR-MCNC: 0.7 MG/DL
MICROALBUMIN/CREAT 24H UR-RTO: 4.4 UG/MG (ref ?–30)
NONHDLC SERPL-MCNC: 210 MG/DL (ref ?–130)
OSMOLALITY SERPL CALC.SUM OF ELEC: 293 MOSM/KG (ref 275–295)
POTASSIUM SERPL-SCNC: 4.2 MMOL/L (ref 3.5–5.1)
PROT SERPL-MCNC: 7.1 G/DL (ref 5.7–8.2)
SODIUM SERPL-SCNC: 140 MMOL/L (ref 136–145)
T4 FREE SERPL-MCNC: 1.1 NG/DL (ref 0.8–1.7)
TRIGL SERPL-MCNC: 66 MG/DL (ref 30–149)
TSI SER-ACNC: 2.76 MIU/ML (ref 0.55–4.78)
VIT D+METAB SERPL-MCNC: 27.6 NG/ML (ref 30–100)
VLDLC SERPL CALC-MCNC: 14 MG/DL (ref 0–30)

## 2024-10-11 PROCEDURE — 82306 VITAMIN D 25 HYDROXY: CPT

## 2024-10-11 PROCEDURE — 80053 COMPREHEN METABOLIC PANEL: CPT

## 2024-10-11 PROCEDURE — 82570 ASSAY OF URINE CREATININE: CPT

## 2024-10-11 PROCEDURE — 84439 ASSAY OF FREE THYROXINE: CPT

## 2024-10-11 PROCEDURE — 82043 UR ALBUMIN QUANTITATIVE: CPT

## 2024-10-11 PROCEDURE — 36415 COLL VENOUS BLD VENIPUNCTURE: CPT

## 2024-10-11 PROCEDURE — 80061 LIPID PANEL: CPT

## 2024-10-11 PROCEDURE — 84443 ASSAY THYROID STIM HORMONE: CPT

## 2024-10-11 RX ORDER — FLUOXETINE 40 MG/1
40 CAPSULE ORAL DAILY
Qty: 90 CAPSULE | Refills: 3 | Status: SHIPPED | OUTPATIENT
Start: 2024-10-11

## 2024-10-11 NOTE — TELEPHONE ENCOUNTER
Please Review. Protocol Failed; No Protocol     Requested Prescriptions   Pending Prescriptions Disp Refills    FLUOXETINE HCL 40 MG Oral Cap [Pharmacy Med Name: FLUOXETINE HYDROCHLORIDE 40MG CAPSULE] 90 capsule 3     Sig: TAKE 1 CAPSULE (40 MG TOTAL) BY MOUTH DAILY. TAKE ALONG WITH 20 MG FOR A TOTAL DOSE OF 60 MG       Psychiatric Non-Scheduled (Anti-Anxiety) Failed - 10/11/2024 11:35 AM        Failed - Depression Screening completed within the past 12 months        Passed - In person appointment or virtual visit in the past 6 mos or appointment in next 3 mos     Recent Outpatient Visits              2 months ago [facility]izzy spells    [facility]gr[facility] B[facility]    Office Visit    3 months ago Acute cough    [facility] [place]    Office Visit    4 months ago Type 2 diabetes mellitus with other specified complication, without long-term current use of insulin ([facility])    [facility] [place] Sud[place] [place]    Office Visit    8 months ago Flank pain    [facility] [place] Daisy Lockwood APRN    Office Visit    1 year ago Type 2 diabetes mellitus with other specified complication, without long-term current use of insulin ([facility])    [facility] Y[facility], Sudha K, MD    Office Visit          Future Appointments         Provider Department Appt Notes    In 1 week Dasia Gurrola MD [facility] [place]     In 3 months Kevin Rivera MD [facility] [place] f/up allergy                           Future Appointments         Provider Department Appt Notes    In 1 week Dasia Gurrola MD [facility] [place]     In 3 months Kevin Rivera MD [facility]  [place] [place] f/up allergy          Recent Outpatient Visits              2 months ago Dizzy spells    [facility], [place] Agron B, MD    Office Visit    3 months ago Acute cough    [facility], [place] Agron B, MD    Office Visit    4 months ago Type 2 diabetes mellitus with other specified complication, without long-term current use of insulin ([facility])    [facility], [place] Sudha K, MD    Office Visit    8 months ago Flank pain    [facility], [facility] Daisy Lockwood APRN    Office Visit    1 year ago Type 2 diabetes mellitus with other specified complication, without long-term current use of insulin ([facility])    [facility], [place] R[place] Sudha K, MD    Office Visit

## 2024-10-20 NOTE — PROGRESS NOTES
Name: Genny Mc  Date: 10/21/24    Referring Physician: No ref. provider found    HISTORY OF PRESENT ILLNESS   Genny Mc is a 64 year old female who presents for evaluation and management of type 2 diabetes. She was diagnosed with diabetes about many years ago. She is here in follow up.     Blood Glucose Today: 167   HbA1C or glycohemoglobin is 7.8 today (and it was 8.6 on 5/16/24 and it was 6.6 on 10/02/23 and it was 7.4 on 4/27/23 and it was 7.3 on 12/03/22 and it was 7.2 on 7/21/22 and it was 8.4 on 4/20/22 and it was 8.0 on 2/09/22 and it was 7.8 on 11/02/21 and it was 7.8 on 6/21/21)  Type 1 or Type 2?: Type 2  Medications for DM: Januvia 100mg PO qday  Checking 0 times per day  Episodes of hypoglycemia: none    Dietary compliance:   Better    Exercise: none, moves around a lot, walking around a lot    Polyuria/polydipsia: none    Blurred vision: none    Flu Vaccine This Season: does not want this    Covid Vaccine: Covid vaccine 1 and 2    REVIEW OF SYSTEMS  CV: Cardiovascular disease present: none         Hypertension present: low, not on meds         Hyperlipidemia present: LDL is increased (10/11/24)          Peripheral Vascular Disease present: none    : Nephropathy present: none (10/11/24); creatinine- 0.87    Neuro: Neuropathy present: yes, on Gabapentin    Eyes: Diabetic retinopathy present: none            Most recent visit to eye doctor in last 12 months: yes, recently    Skin: Infection or ulceration: none    Osteoporosis: none; vitamin D- 27.6 (10/21/24)    Thyroid disease: yes, on 88mcg PO qday; TSH- 2.763 (2.763)    Medications:     Current Outpatient Medications:     JANUVIA 100 MG Oral Tab, TAKE 1 TABLET (100 MG TOTAL) BY MOUTH DAILY., Disp: 90 tablet, Rfl: 2    FLUoxetine HCl 40 MG Oral Cap, Take 1 capsule (40 mg total) by mouth daily. Take along with 20 mg for a total dose of 60 mg, Disp: 90 capsule, Rfl: 3    SYMBICORT 80-4.5 MCG/ACT Inhalation Aerosol, SHAKE BEFORE USE AND INHALE 2  PUFFS INTO THE LUNGS TWICE A DAY, Disp: 10.2 g, Rfl: 2    ergocalciferol 1.25 MG (75747 UT) Oral Cap, Take 1 capsule (50,000 Units total) by mouth every 14 (fourteen) days., Disp: 6 capsule, Rfl: 0    nicotine 21 MG/24HR Transdermal Patch 24 Hr, Place 1 patch onto the skin daily., Disp: 30 patch, Rfl: 2    levothyroxine 88 MCG Oral Tab, Take 1 tablet (88 mcg total) by mouth before breakfast., Disp: 90 tablet, Rfl: 0    atorvastatin 10 MG Oral Tab, Take 1 tablet (10 mg total) by mouth every other day., Disp: 90 tablet, Rfl: 1    fluticasone propionate 50 MCG/ACT Nasal Suspension, 2 sprays by Nasal route daily., Disp: 48 mL, Rfl: 2    hydrocortisone 2.5 % External Cream, Apply bid to involved areas as needed, Disp: 60 g, Rfl: 0    levocetirizine 5 MG Oral Tab, Take 1 tablet (5 mg total) by mouth nightly., Disp: 90 tablet, Rfl: 0    montelukast 10 MG Oral Tab, Take 1 tablet (10 mg total) by mouth every evening., Disp: 90 tablet, Rfl: 1    prochlorperazine 5 MG Oral, Take 1 tablet (5 mg total) by mouth nightly as needed for Nausea., Disp: 30 tablet, Rfl: 1    albuterol 108 (90 Base) MCG/ACT Inhalation Aero Soln, Inhale 2 puffs into the lungs every 4 (four) hours as needed for Wheezing., Disp: 8.5 g, Rfl: 2    Azelastine HCl 0.05 % Ophthalmic Solution, Apply 1 drop to eye 2 (two) times daily., Disp: 18 mL, Rfl: 0    Lancets (ONETOUCH DELICA PLUS [id number]) Does not apply Misc, 1 each by In Vitro route 3 (three) times daily. Use test strip to check blood sugar 3 times per day, Disp: 300 each, Rfl: 0    Glucose Blood (ONETOUCH VERIO) In Vitro Strip, Use test strips to check blood sugar 3 times per day., Disp: 300 strip, Rfl: 0    Glucose Blood (ONETOUCH VERIO) In Vitro Strip, TEST BLOOD SUGAR THREE TIMES DAILY AS NEEDED, Disp: 300 strip, Rfl: 11    gabapentin 800 MG Oral Tab, Take 1 tablet (800 mg total) by mouth 3 (three) times daily., Disp: , Rfl:     zolpidem 10 MG Oral Tab, Take 1 tablet (10 mg total) by mouth nightly  as needed. AT BEDTIME, Disp: , Rfl:     NARCAN 4 MG/0.1ML Nasal Liquid, , Disp: , Rfl:     EZETIMIBE 10 MG Oral Tab, TAKE 1 TABLET (10 MG TOTAL) BY MOUTH NIGHTLY., Disp: 90 tablet, Rfl: 0    ondansetron 4 MG Oral Tablet Dispersible, Take 1-2 tablets (4-8 mg total) by mouth 2 (two) times daily as needed for Nausea., Disp: 60 tablet, Rfl: 11    clonazePAM 0.5 MG Oral Tab, Take 1 tablet (0.5 mg total) by mouth nightly as needed., Disp: , Rfl:     hydrOXYzine Pamoate 25 MG Oral Cap, Take 1 capsule (25 mg total) by mouth nightly as needed (allergies)., Disp: 30 capsule, Rfl: 5    Blood Glucose Monitoring Suppl Supplies Does not apply Misc, Please supply patient with glucose meter, test strips and lancets covered by her insurance, test TID, Disp: 1 kit, Rfl: 0     Allergies:   Allergies   Allergen Reactions    Radiology Contrast Iodinated Dyes HIVES, SWELLING and SHORTNESS OF BREATH     Had some kind of contrast many years ago with an x-ray.     Other NAUSEA ONLY     PO antibiotics       Social History:   Social History     Socioeconomic History    Marital status:    Tobacco Use    Smoking status: Some Days     Current packs/day: 0.00     Types: Cigarettes     Start date: 1973     Last attempt to quit: 2018     Years since quittin.9    Smokeless tobacco: Never    Tobacco comments:     quit then starts smoking someday   Vaping Use    Vaping status: Never Used   Substance and Sexual Activity    Alcohol use: Never    Drug use: No    Sexual activity: Not Currently   Other Topics Concern    Caffeine Concern Yes     Comment: coffee, 1cup/day    Exercise Yes   Social History Narrative    The patient does not use an assistive device..      The patient does live in a home with stairs.     Social Drivers of Health      [facility]       Medical History:   Past Medical History:    Calculus of kidney    COPD (chronic obstructive pulmonary disease) (HCC)    Diabetes  (HCC)    Disorder of thyroid    High cholesterol    Kidney stones    PONV (postoperative nausea and vomiting)    Rotator cuff impingement syndrome of right shoulder       Surgical history:   Past Surgical History:   Procedure Laterality Date    Appendectomy      Appendectomy      Blepharoplasty upper skin only - od - right eye Right 2017    [facility]     Blepharoplasty upper skin only - os - left eye Left 2017    [facility]     Colonoscopy N/A 11/20/2018    Procedure: COLONOSCOPY;  Surgeon: Juan Marcus MD;  Location: [facility] ENDOSCOPY    Egd  2018    Lithotripsy      Tonsillectomy           PHYSICAL EXAM  Vitals:    10/21/24 1325   BP: 103/57   Pulse: 64   Weight: 132 lb (59.9 kg)   Height: 5' 6\" (1.676 m)       General Appearance:  alert, well developed, in no acute distress  Eyes:  normal conjunctivae, sclera., normal sclera and normal pupils  Psychiatric:  oriented to time, self, and place  Nutritional:  no abnormal weight gain or loss  Bilateral barefoot skin diabetic exam is normal, visualized feet and the appearance is normal.  Bilateral monofilament/sensation of both feet is normal.  Pulsation pedal pulse exam of both lower legs/feet is normal as well.        Lab Data:   Lab Results   Component Value Date     (H) 04/27/2023    A1C 8.6 (A) 05/16/2024     Lab Results   Component Value Date     (H) 10/11/2024    BUN 15 10/11/2024    BUNCREA 17.2 10/11/2024    CREATSERUM 0.87 10/11/2024    ANIONGAP 3 10/11/2024    GFRNAA 75 02/09/2022    GFRAA 87 02/09/2022    CA 9.6 10/11/2024    OSMOCALC 293 10/11/2024    ALKPHO 58 10/11/2024    AST 15 10/11/2024    ALT 10 10/11/2024    BILT 0.6 10/11/2024    TP 7.1 10/11/2024    ALB 4.7 10/11/2024    GLOBULIN 2.4 10/11/2024     10/11/2024    K 4.2 10/11/2024     10/11/2024    CO2 31.0 10/11/2024     Lab Results   Component Value Date    CHOLEST 284 (H) 10/11/2024    TRIG 66 10/11/2024    HDL 74 (H) 10/11/2024     (H)  10/11/2024    VLDL 14 10/11/2024    NONHDLC 210 (H) 10/11/2024     Lab Results   Component Value Date    MALBP 0.70 10/11/2024    CREUR 157.40 10/11/2024         ASSESSMENT/PLAN:    This is a 64 year-old woman here for evaluation and management of uncontrolled type 2 diabetes. We discussed the ABCs of DM.     1.) Hyperglycemia Management- We discussed the importance of glycemic control to prevent complications of diabetes. We discussed the importance of SBGM. I offered and provided patient education materials and offered a blood glucose log book.   - Continue Januvia 100mg PO qAM  - Start checking blood sugars at least once daily at different times of the day    2.) Management of Diabetic Complications- We discussed the complications of diabetes include retinopathy, neuropathy, nephropathy and cardiovascular disease.   - Ophtho- none, up to date  - Flu and Covid vaccine- up to date  - BP- at goal, not on meds  - Lipids- will check in 6 months, will start taking atorvastatin every night  - MAC- will check in 6 months  - CMP- will check in 6 months  - Neuropathy- she has this and is on gabapentin  - CAD- none    3.) Lifestyle Management for Diabetes- We discussed importance of a low CHO diet, and recommend 45gm per meal or 135gm per day. We discussed the importance of trying to follow a Mediterranean diet, with an emphasis on vegetables at every meal, with lots whole grains, and protein from either plant-based sources, or poultry and fish.   - Diet- she is going to work on this  - Exercise- she is going to work on this    4.) Hypothyroidism- she is on 88mcg levothyroxine, check TSH and FT4 in 6 months    5.) Vitamin D Deficiency- start ergocalciferol 50,000 every 2 weeks and then check vitamin D again in 6 months    Return to clinic in 6 months    Prior to this encounter, I spent over 15 minutes with preparing for the visit, including reviewing documents from other specialties as well as from PCP and going over test  results. During the face to face encounter, I spent an additional 15 minutes which were determined for follow-up. Greater than 50% of the time was spent in counseling, anticipatory guidance, and coordination of care. Patient concerns were answered to the best of my knowledge.         10/21/24  Dasia Gurrola MD

## 2024-10-21 ENCOUNTER — OFFICE VISIT (OUTPATIENT)
Dept: ENDOCRINOLOGY CLINIC | Facility: CLINIC | Age: 64
End: 2024-10-21

## 2024-10-21 VITALS
SYSTOLIC BLOOD PRESSURE: 103 MMHG | DIASTOLIC BLOOD PRESSURE: 57 MMHG | BODY MASS INDEX: 21.21 KG/M2 | HEART RATE: 64 BPM | WEIGHT: 132 LBS | HEIGHT: 66 IN

## 2024-10-21 DIAGNOSIS — E11.65 TYPE 2 DIABETES MELLITUS WITH HYPERGLYCEMIA, WITHOUT LONG-TERM CURRENT USE OF INSULIN (HCC): ICD-10-CM

## 2024-10-21 DIAGNOSIS — E78.00 HYPERCHOLESTEROLEMIA: ICD-10-CM

## 2024-10-21 DIAGNOSIS — E03.9 ACQUIRED HYPOTHYROIDISM: ICD-10-CM

## 2024-10-21 DIAGNOSIS — E55.9 VITAMIN D DEFICIENCY: Primary | ICD-10-CM

## 2024-10-21 LAB
CARTRIDGE EXPIRATION DATE: ABNORMAL DATE
GLUCOSE BLOOD: 167
HEMOGLOBIN A1C: 7.8 % (ref 4.3–5.6)
TEST STRIP LOT #: NORMAL NUMERIC

## 2024-10-21 PROCEDURE — 82947 ASSAY GLUCOSE BLOOD QUANT: CPT | Performed by: INTERNAL MEDICINE

## 2024-10-21 PROCEDURE — 83036 HEMOGLOBIN GLYCOSYLATED A1C: CPT | Performed by: INTERNAL MEDICINE

## 2024-10-21 PROCEDURE — 99214 OFFICE O/P EST MOD 30 MIN: CPT | Performed by: INTERNAL MEDICINE

## 2024-10-21 RX ORDER — ATORVASTATIN CALCIUM 10 MG/1
10 TABLET, FILM COATED ORAL NIGHTLY
Qty: 90 TABLET | Refills: 3 | Status: SHIPPED | OUTPATIENT
Start: 2024-10-21

## 2024-10-21 RX ORDER — LEVOTHYROXINE SODIUM 88 UG/1
88 TABLET ORAL
Qty: 90 TABLET | Refills: 3 | Status: SHIPPED | OUTPATIENT
Start: 2024-10-21

## 2024-10-21 RX ORDER — ERGOCALCIFEROL 1.25 MG/1
50000 CAPSULE ORAL
Qty: 6 CAPSULE | Refills: 3 | Status: SHIPPED | OUTPATIENT
Start: 2024-10-21

## 2024-10-24 ENCOUNTER — NURSE TRIAGE (OUTPATIENT)
Dept: INTERNAL MEDICINE CLINIC | Facility: CLINIC | Age: 64
End: 2024-10-24

## 2024-10-24 ENCOUNTER — TELEMEDICINE (OUTPATIENT)
Dept: INTERNAL MEDICINE CLINIC | Facility: CLINIC | Age: 64
End: 2024-10-24

## 2024-10-24 DIAGNOSIS — R06.2 WHEEZING: ICD-10-CM

## 2024-10-24 DIAGNOSIS — J44.1 COPD WITH ACUTE EXACERBATION (HCC): Primary | ICD-10-CM

## 2024-10-24 DIAGNOSIS — R05.1 ACUTE COUGH: ICD-10-CM

## 2024-10-24 PROCEDURE — 99214 OFFICE O/P EST MOD 30 MIN: CPT | Performed by: INTERNAL MEDICINE

## 2024-10-24 RX ORDER — BENZONATATE 100 MG/1
100 CAPSULE ORAL 3 TIMES DAILY PRN
Qty: 20 CAPSULE | Refills: 0 | Status: SHIPPED | OUTPATIENT
Start: 2024-10-24 | End: 2024-10-31

## 2024-10-24 RX ORDER — AZITHROMYCIN 250 MG/1
TABLET, FILM COATED ORAL
Qty: 6 TABLET | Refills: 0 | Status: SHIPPED | OUTPATIENT
Start: 2024-10-24 | End: 2024-10-28

## 2024-10-24 RX ORDER — METHYLPREDNISOLONE 4 MG/1
TABLET ORAL
Qty: 1 EACH | Refills: 0 | Status: SHIPPED | OUTPATIENT
Start: 2024-10-24

## 2024-10-24 NOTE — TELEPHONE ENCOUNTER
Patient seeking antibiotic.   Yesterday started with cough. Her grandchild is sick.   Patient is Coughing a lot. Feels congestion in chest.   Cough sounds wet.     No chills. No fever. Not short of breathe.     Last night did inhaler.  Hx of copd.     Patient has a wedding Saturday and hoping to obtain medication.    Patient advised appointment. Video visit LINK scheduled today. 1pm.   Patient verbalized understanding.    Reason for Disposition   SEVERE coughing spells (e.g., whooping sound after coughing, vomiting after coughing)    Protocols used: Cough-A-OH

## 2024-10-24 NOTE — PROGRESS NOTES
Subjective:   Patient ID: Genny Mc is a 64 year old female.    HPI  Patient seen through live two-way video visit calling today with complaint of coughing for few days wheezing some shortness of breath when coughing too hard patient has COPD she is since she caught something from her family.  No fever    History/Other:   Review of Systems   Constitutional:  Positive for fatigue. Negative for fever.   HENT: Negative.     Eyes: Negative.    Respiratory:  Positive for cough and wheezing.    Cardiovascular: Negative.    Gastrointestinal: Negative.    Genitourinary: Negative.    Musculoskeletal: Negative.    Skin: Negative.    Neurological: Negative.    Psychiatric/Behavioral: Negative.       Current Outpatient Medications   Medication Sig Dispense Refill    azithromycin (ZITHROMAX Z-AMEENA) 250 MG Oral Tab Take 2 tablets (500 mg total) by mouth daily for 1 day, THEN 1 tablet (250 mg total) daily for 4 days. 6 tablet 0    benzonatate 100 MG Oral Cap Take 1 capsule (100 mg total) by mouth 3 (three) times daily as needed for cough. 20 capsule 0    methylPREDNISolone (MEDROL) 4 MG Oral Tablet Therapy Pack As directed. 1 each 0    ergocalciferol 1.25 MG (84448 UT) Oral Cap Take 1 capsule (50,000 Units total) by mouth every 14 (fourteen) days. 6 capsule 3    levothyroxine 88 MCG Oral Tab Take 1 tablet (88 mcg total) by mouth before breakfast. 90 tablet 3    SITagliptin Phosphate (JANUVIA) 100 MG Oral Tab Take 1 tablet (100 mg total) by mouth daily. 90 tablet 3    atorvastatin 10 MG Oral Tab Take 1 tablet (10 mg total) by mouth nightly. 90 tablet 3    FLUoxetine HCl 40 MG Oral Cap Take 1 capsule (40 mg total) by mouth daily. Take along with 20 mg for a total dose of 60 mg 90 capsule 3    SYMBICORT 80-4.5 MCG/ACT Inhalation Aerosol SHAKE BEFORE USE AND INHALE 2 PUFFS INTO THE LUNGS TWICE A DAY 10.2 g 2    nicotine 21 MG/24HR Transdermal Patch 24 Hr Place 1 patch onto the skin daily. 30 patch 2    levothyroxine 88 MCG Oral Tab  Take 1 tablet (88 mcg total) by mouth before breakfast. 90 tablet 0    atorvastatin 10 MG Oral Tab Take 1 tablet (10 mg total) by mouth every other day. 90 tablet 1    fluticasone propionate 50 MCG/ACT Nasal Suspension 2 sprays by Nasal route daily. 48 mL 2    hydrocortisone 2.5 % External Cream Apply bid to involved areas as needed 60 g 0    levocetirizine 5 MG Oral Tab Take 1 tablet (5 mg total) by mouth nightly. 90 tablet 0    montelukast 10 MG Oral Tab Take 1 tablet (10 mg total) by mouth every evening. 90 tablet 1    prochlorperazine 5 MG Oral Take 1 tablet (5 mg total) by mouth nightly as needed for Nausea. 30 tablet 1    albuterol 108 (90 Base) MCG/ACT Inhalation Aero Soln Inhale 2 puffs into the lungs every 4 (four) hours as needed for Wheezing. 8.5 g 2    Azelastine HCl 0.05 % Ophthalmic Solution Apply 1 drop to eye 2 (two) times daily. 18 mL 0    Lancets (ONETOUCH DELICA PLUS [id number]) Does not apply Misc 1 each by In Vitro route 3 (three) times daily. Use test strip to check blood sugar 3 times per day 300 each 0    Glucose Blood (ONETOUCH VERIO) In Vitro Strip Use test strips to check blood sugar 3 times per day. 300 strip 0    Glucose Blood (ONETOUCH VERIO) In Vitro Strip TEST BLOOD SUGAR THREE TIMES DAILY AS NEEDED 300 strip 11    gabapentin 800 MG Oral Tab Take 1 tablet (800 mg total) by mouth 3 (three) times daily.      zolpidem 10 MG Oral Tab Take 1 tablet (10 mg total) by mouth nightly as needed. AT BEDTIME      NARCAN 4 MG/0.1ML Nasal Liquid       EZETIMIBE 10 MG Oral Tab TAKE 1 TABLET (10 MG TOTAL) BY MOUTH NIGHTLY. 90 tablet 0    ondansetron 4 MG Oral Tablet Dispersible Take 1-2 tablets (4-8 mg total) by mouth 2 (two) times daily as needed for Nausea. 60 tablet 11    clonazePAM 0.5 MG Oral Tab Take 1 tablet (0.5 mg total) by mouth nightly as needed.      hydrOXYzine Pamoate 25 MG Oral Cap Take 1 capsule (25 mg total) by mouth nightly as needed (allergies). 30 capsule 5    Blood Glucose  Monitoring Suppl Supplies Does not apply Misc Please supply patient with glucose meter, test strips and lancets covered by her insurance, test TID 1 kit 0     Allergies:Allergies[1]    Objective:   Physical Exam  Constitutional:       Appearance: She is ill-appearing.   Pulmonary:      Effort: No respiratory distress.   Neurological:      Mental Status: She is oriented to person, place, and time.         Assessment & Plan:   1. COPD with acute exacerbation (HCC) continue to use inhaler as needed will give antibiotic cough medicine and steroids any worsening symptoms or any drop in oxygen below 90 patient advised to go to ER she understands she will buya pulse ox meter at the pharmacy   2. Acute cough -as above take medication let us know if not better   3. Wheezing take the treatment let us know if not better     21 min spent   No orders of the defined types were placed in this encounter.      Meds This Visit:  Requested Prescriptions     Signed Prescriptions Disp Refills    azithromycin (ZITHROMAX Z-AMEENA) 250 MG Oral Tab 6 tablet 0     Sig: Take 2 tablets (500 mg total) by mouth daily for 1 day, THEN 1 tablet (250 mg total) daily for 4 days.    benzonatate 100 MG Oral Cap 20 capsule 0     Sig: Take 1 capsule (100 mg total) by mouth 3 (three) times daily as needed for cough.    methylPREDNISolone (MEDROL) 4 MG Oral Tablet Therapy Pack 1 each 0     Sig: As directed.       Imaging & Referrals:  None         [1]   Allergies  Allergen Reactions    Radiology Contrast Iodinated Dyes HIVES, SWELLING and SHORTNESS OF BREATH     Had some kind of contrast many years ago with an x-ray.     Other NAUSEA ONLY     PO antibiotics

## 2024-11-02 ENCOUNTER — APPOINTMENT (OUTPATIENT)
Dept: GENERAL RADIOLOGY | Facility: HOSPITAL | Age: 64
End: 2024-11-02
Attending: EMERGENCY MEDICINE
Payer: MEDICARE

## 2024-11-02 ENCOUNTER — HOSPITAL ENCOUNTER (EMERGENCY)
Facility: HOSPITAL | Age: 64
Discharge: HOME | End: 2024-11-02
Attending: EMERGENCY MEDICINE
Payer: MEDICARE

## 2024-11-02 VITALS
HEIGHT: 66.5 IN | OXYGEN SATURATION: 92 % | DIASTOLIC BLOOD PRESSURE: 67 MMHG | TEMPERATURE: 99 F | RESPIRATION RATE: 20 BRPM | HEART RATE: 74 BPM | SYSTOLIC BLOOD PRESSURE: 119 MMHG | WEIGHT: 135 LBS | BODY MASS INDEX: 21.44 KG/M2

## 2024-11-02 DIAGNOSIS — J40 BRONCHITIS: Primary | ICD-10-CM

## 2024-11-02 LAB
ANION GAP SERPL CALC-SCNC: 7 MMOL/L (ref 0–18)
BASOPHILS # BLD AUTO: 0.09 X10(3) UL (ref 0–0.2)
BASOPHILS NFR BLD AUTO: 1.1 %
BUN BLD-MCNC: 14 MG/DL (ref 9–23)
BUN/CREAT SERPL: 13.9 (ref 10–20)
CALCIUM BLD-MCNC: 10.3 MG/DL (ref 8.7–10.4)
CHLORIDE SERPL-SCNC: 101 MMOL/L (ref 98–112)
CO2 SERPL-SCNC: 29 MMOL/L (ref 21–32)
CREAT BLD-MCNC: 1.01 MG/DL
DEPRECATED RDW RBC AUTO: 46.8 FL (ref 35.1–46.3)
EGFRCR SERPLBLD CKD-EPI 2021: 61 ML/MIN/1.73M2 (ref 60–?)
EOSINOPHIL # BLD AUTO: 0.21 X10(3) UL (ref 0–0.7)
EOSINOPHIL NFR BLD AUTO: 2.6 %
ERYTHROCYTE [DISTWIDTH] IN BLOOD BY AUTOMATED COUNT: 14.1 % (ref 11–15)
GLUCOSE BLD-MCNC: 293 MG/DL (ref 70–99)
HCT VFR BLD AUTO: 41.7 %
HGB BLD-MCNC: 13.9 G/DL
IMM GRANULOCYTES # BLD AUTO: 0.04 X10(3) UL (ref 0–1)
IMM GRANULOCYTES NFR BLD: 0.5 %
LYMPHOCYTES # BLD AUTO: 2.53 X10(3) UL (ref 1–4)
LYMPHOCYTES NFR BLD AUTO: 31.3 %
MCH RBC QN AUTO: 30 PG (ref 26–34)
MCHC RBC AUTO-ENTMCNC: 33.3 G/DL (ref 31–37)
MCV RBC AUTO: 90.1 FL
MONOCYTES # BLD AUTO: 0.83 X10(3) UL (ref 0.1–1)
MONOCYTES NFR BLD AUTO: 10.3 %
NEUTROPHILS # BLD AUTO: 4.39 X10 (3) UL (ref 1.5–7.7)
NEUTROPHILS # BLD AUTO: 4.39 X10(3) UL (ref 1.5–7.7)
NEUTROPHILS NFR BLD AUTO: 54.2 %
OSMOLALITY SERPL CALC.SUM OF ELEC: 295 MOSM/KG (ref 275–295)
PLATELET # BLD AUTO: 272 10(3)UL (ref 150–450)
POTASSIUM SERPL-SCNC: 3.9 MMOL/L (ref 3.5–5.1)
RBC # BLD AUTO: 4.63 X10(6)UL
SODIUM SERPL-SCNC: 137 MMOL/L (ref 136–145)
WBC # BLD AUTO: 8.1 X10(3) UL (ref 4–11)

## 2024-11-02 PROCEDURE — 96374 THER/PROPH/DIAG INJ IV PUSH: CPT

## 2024-11-02 PROCEDURE — 85025 COMPLETE CBC W/AUTO DIFF WBC: CPT | Performed by: EMERGENCY MEDICINE

## 2024-11-02 PROCEDURE — 93010 ELECTROCARDIOGRAM REPORT: CPT

## 2024-11-02 PROCEDURE — 71045 X-RAY EXAM CHEST 1 VIEW: CPT | Performed by: EMERGENCY MEDICINE

## 2024-11-02 PROCEDURE — 99284 EMERGENCY DEPT VISIT MOD MDM: CPT

## 2024-11-02 PROCEDURE — 93005 ELECTROCARDIOGRAM TRACING: CPT

## 2024-11-02 PROCEDURE — 80048 BASIC METABOLIC PNL TOTAL CA: CPT | Performed by: EMERGENCY MEDICINE

## 2024-11-02 PROCEDURE — 94640 AIRWAY INHALATION TREATMENT: CPT

## 2024-11-02 RX ORDER — METHYLPREDNISOLONE SODIUM SUCCINATE 125 MG/2ML
80 INJECTION INTRAMUSCULAR; INTRAVENOUS ONCE
Status: COMPLETED | OUTPATIENT
Start: 2024-11-02 | End: 2024-11-02

## 2024-11-02 RX ORDER — PREDNISONE 20 MG/1
40 TABLET ORAL DAILY
Qty: 10 TABLET | Refills: 0 | Status: SHIPPED | OUTPATIENT
Start: 2024-11-02 | End: 2024-11-07

## 2024-11-02 RX ORDER — ALBUTEROL SULFATE 0.83 MG/ML
2.5 SOLUTION RESPIRATORY (INHALATION) EVERY 4 HOURS PRN
Qty: 30 EACH | Refills: 0 | Status: SHIPPED | OUTPATIENT
Start: 2024-11-02 | End: 2024-12-02

## 2024-11-02 RX ORDER — BENZONATATE 100 MG/1
100 CAPSULE ORAL 3 TIMES DAILY PRN
Qty: 30 CAPSULE | Refills: 0 | Status: SHIPPED | OUTPATIENT
Start: 2024-11-02 | End: 2024-12-02

## 2024-11-02 RX ORDER — IPRATROPIUM BROMIDE AND ALBUTEROL SULFATE 2.5; .5 MG/3ML; MG/3ML
3 SOLUTION RESPIRATORY (INHALATION) ONCE
Status: COMPLETED | OUTPATIENT
Start: 2024-11-02 | End: 2024-11-02

## 2024-11-02 NOTE — ED PROVIDER NOTES
Patient Seen in: [facility] Emergency Department    History     Chief Complaint   Patient presents with    Difficulty Breathing       HPI    History is provided by patient/independent historian: patient  64 year old female with history of COPD, diabetes, thyroid disorder, hyperlipidemia, here with complaints of cough for the last 2-1/2 weeks.  Initially started off with runny nose and cough.  Her doctor prescribed her some steroid which she did not take as prescribed.  She was told to check her oxygenation and if it dropped below 90 to come in for further evaluation.  She checked it this morning and noticed a heart rate of 168 and decided to come in for further evaluation.  Her cough is deep, nonproductive.  She does feel wheezing deep in her chest.    History reviewed.   Past Medical History:    Calculus of kidney    COPD (chronic obstructive pulmonary disease) (HCC)    Diabetes (HCC)    Disorder of thyroid    High cholesterol    Kidney stones    PONV (postoperative nausea and vomiting)    Rotator cuff impingement syndrome of right shoulder         History reviewed.   Past Surgical History:   Procedure Laterality Date    Appendectomy      Appendectomy      Blepharoplasty upper skin only - od - right eye Right     [facility]     Blepharoplasty upper skin only - os - left eye Left     [facility]     Colonoscopy N/A 2018    Procedure: COLONOSCOPY;  Surgeon: Juan Marcus MD;  Location: [facility] ENDOSCOPY    Egd  2018    Lithotripsy      Tonsillectomy           Home Medications reviewed :  Prescriptions Prior to Admission[1]      History reviewed.   Social History     Socioeconomic History    Marital status:    Tobacco Use    Smoking status: Some Days     Current packs/day: 0.00     Types: Cigarettes     Start date: 1973     Last attempt to quit: 2018     Years since quittin.9    Smokeless tobacco: Never    Tobacco comments:     quit then starts  smoking someday   Vaping Use    Vaping status: Never Used   Substance and Sexual Activity    Alcohol use: Never    Drug use: No    Sexual activity: Not Currently   Other Topics Concern    Caffeine Concern Yes     Comment: coffee, 1cup/day    Exercise Yes         ROS  Review of Systems   Respiratory:  Positive for cough, shortness of breath and wheezing.    Cardiovascular:  Negative for chest pain.   All other systems reviewed and are negative.     All other pertinent organ systems are reviewed and are negative.      Physical Exam     ED Triage Vitals [11/02/24 1717]   /65   Pulse 88   Resp 19   Temp 98.8 °F (37.1 °C)   Temp src Oral   SpO2 96 %   O2 Device None (Room air)     Vital signs reviewed.      Physical Exam  Vitals and nursing note reviewed.   HENT:      Nose: Congestion present.   Cardiovascular:      Pulses: Normal pulses.   Pulmonary:      Effort: No respiratory distress.      Comments: Deep cough  Abdominal:      General: There is no distension.   Neurological:      Mental Status: She is alert.         ED Course       Labs:     Labs Reviewed   CBC WITH DIFFERENTIAL WITH PLATELET - Abnormal; Notable for the following components:       Result Value    RDW-SD 46.8 (*)     All other components within normal limits   BASIC METABOLIC PANEL (8) - Abnormal; Notable for the following components:    Glucose 293 (*)     All other components within normal limits   RAINBOW DRAW LAVENDER   RAINBOW DRAW LIGHT GREEN   RAINBOW DRAW BLUE         My EKG Interpretation: EKG    Rate, intervals and axes as noted on EKG Report.  Rate: 83  Rhythm: Sinus Rhythm  Reading: normal for rate, normal for rhythm, no acute ST changes           As reviewed and Interpreted by me      Imaging Results Available and Reviewed while in ED:   XR CHEST AP PORTABLE  (CPT=71045)    Result Date: 11/2/2024  CONCLUSION:  Stable chest demonstrating radiographic findings of COPD without radiographically evident acute intrathoracic process.     Dictated by (CST): Tim Ortiz MD on 11/02/2024 at 6:45 PM     Finalized by (CST): Tim Ortiz MD on 11/02/2024 at 6:46 PM         My review and independent interpretation of XR images: no infiltrate. Radiology report corroborates this in addition to other details as reported by them.      Decision rules/scores evaluated: none      Diagnostic labs/tests considered but not ordered: ddimer    ED Medications Administered:   Medications   ipratropium-albuterol (Duoneb) 0.5-2.5 (3) MG/3ML inhalation solution 3 mL (has no administration in time range)   methylPREDNISolone sodium succinate (Solu-MEDROL) injection 80 mg (80 mg Intravenous Given 11/2/24 1746)                MDM       Medical Decision Making      Differential Diagnosis: After obtaining the patient's history, performing the physical exam and reviewing the diagnostics, multiple initial diagnoses were considered based on the presenting problem including COPD exacerbation, bronchitis, viral syndrome, pneumonia, fluid overload    External document review: I personally reviewed available external medical records for any recent pertinent discharge summaries, testing, and procedures - the findings are as follows: 10/24/24 visit with Dr. Vargas  for cough/wheezing    Complicating Factors: The patient already  has a past medical history of Calculus of kidney, COPD (chronic obstructive pulmonary disease) (HCC), Diabetes (HCC), Disorder of thyroid, High cholesterol, Kidney stones, PONV (postoperative nausea and vomiting), and Rotator cuff impingement syndrome of right shoulder (9/12/2017). to contribute to the complexity of this ED evaluation.    Procedures performed: none    Discussed management with physician/appropriate source: none    Considered admission/deescalation of care for: shortness of breath    Social determinants of health affecting patient care: none    Prescription medications considered: albuterol, prednisone, discussed continuing current  medication regimen    The patient requires continuous monitoring for: cough    Shared decision making: discussed possible admission      Disposition and Plan     Clinical Impression:  1. Bronchitis        Disposition:  Discharge    Follow-up:  Franck Vargas MD  [address] [facility] [address]  [phone]    Follow up        Medications Prescribed:  Current Discharge Medication List        START taking these medications    Details   albuterol (2.5 MG/3ML) 0.083% Inhalation Nebu Soln Take 3 mL (2.5 mg total) by nebulization every 4 (four) hours as needed for Wheezing or Shortness of Breath.  Qty: 30 each, Refills: 0      predniSONE 20 MG Oral Tab Take 2 tablets (40 mg total) by mouth daily for 5 days.  Qty: 10 tablet, Refills: 0                            [1] (Not in a hospital admission)

## 2024-11-02 NOTE — ED INITIAL ASSESSMENT (HPI)
Pt presents for cough x2.5 weeks, no improvement s/p abx and prednisone. Pt presents for SOB since yesterday, denies fevers. Pt reports home pulse ox read 91% RA and  prompting pt to present for evaluation.

## 2024-11-03 LAB
ATRIAL RATE: 83 BPM
P AXIS: 73 DEGREES
P-R INTERVAL: 164 MS
Q-T INTERVAL: 364 MS
QRS DURATION: 94 MS
QTC CALCULATION (BEZET): 427 MS
R AXIS: 28 DEGREES
T AXIS: 70 DEGREES
VENTRICULAR RATE: 83 BPM

## 2024-11-04 ENCOUNTER — TELEPHONE (OUTPATIENT)
Dept: INTERNAL MEDICINE CLINIC | Facility: CLINIC | Age: 64
End: 2024-11-04

## 2024-11-04 NOTE — TELEPHONE ENCOUNTER
Patient calling ( name and date of birth of patient verified ) asking about her meds from ER     She did not receive Albuterol inhalation as prescribed by ER doctor     Advised to contact her Ernestina in [place] to check the status of the medication     Patient verbalizes understanding and agrees with plan.

## 2025-01-03 DIAGNOSIS — R05.9 COUGH: ICD-10-CM

## 2025-01-04 NOTE — TELEPHONE ENCOUNTER
Refill requested for:  Name from pharmacy: ALBUTEROL SULFATE HFA HFA AEROSOL SOLN          Will file in chart as: ALBUTEROL 108 (90 Base) MCG/ACT Inhalation Aero Soln    Sig: Inhale 2 puffs into the lungs every 4 (four) hours as needed for Wheezing.    Disp: 8.5 g    Refills: 2    Start: 1/3/2025    Class: Normal    Non-formulary For: Cough    Last ordered: 7 months ago (5/23/2024) by Kevin Rivera MD    Last refill: 7/8/2022    Rx #: [id number]    Rescue Inhalers Failed 01/03/2025 03:18 PM   Protocol Details Pass - Pending Nurse Triage Call    Appt in past 6 mos or next 3 mos    Last Refill > 30 days      To be filled at: [facility] - [address] [phone], [phone]       Last office visit: 1/30/2023    Previously advised to follow up in: 6 months or sooner if needed    F/U currently scheduled? Yes on 1/22/2025    Date of last refill: 5/23/2024    ACTION: RN called to triage pt's asthma status.  RN left message requesting pt please contact our office so we can triage symptoms.  Provided call back number and offices hours.  Await call back from patient.

## 2025-01-07 RX ORDER — AZELASTINE HYDROCHLORIDE 0.5 MG/ML
1 SOLUTION/ DROPS OPHTHALMIC 2 TIMES DAILY
Qty: 18 ML | Refills: 0 | Status: SHIPPED | OUTPATIENT
Start: 2025-01-07

## 2025-01-07 RX ORDER — ALBUTEROL SULFATE 90 UG/1
2 INHALANT RESPIRATORY (INHALATION) EVERY 4 HOURS PRN
Qty: 8.5 G | Refills: 2 | Status: SHIPPED | OUTPATIENT
Start: 2025-01-07

## 2025-01-07 RX ORDER — LEVOCETIRIZINE DIHYDROCHLORIDE 5 MG/1
5 TABLET, FILM COATED ORAL NIGHTLY
Qty: 90 TABLET | Refills: 0 | Status: SHIPPED | OUTPATIENT
Start: 2025-01-07

## 2025-01-07 NOTE — TELEPHONE ENCOUNTER
RN called pt back.    She reports that right now her asthma is doing alright but has had a barky, productive cough with colored mucus  Pt currently coughing on phone while speaking  Denies any SOB, chest pain or tightness  Feels like she has a sinus infection--colored nasal drainage and sinus pressure in forehead  Denies any fevers, body aches or chills    Pt concerned that her asthma will start to flare since that is the usual process anytime she becomes sick.  RN advised that patient follow up with PCP or Urgent Care for possible sinus infection as she will likely need to be assessed for antibiotics and steroids  She does have a follow up with Dr. Rivera on 1/22--pt reports she can \"just wait until then\"  RN cautioned against waiting until 1/22 for antibiotics  as infection may worsen and spread to her chest/lungs  Unfortunately Dr. Rivera does not have any sooner appointments available prior to 1/22 and cannot send antibiotics or steroids without seeing a patient.    Pt reports she will call PCP for appointment or follow up in Urgent Care.    Refill for Albuterol sent.  Pt also requesting refill for Levocetirizine and Azelastine eye drops.  Refills sent per protocol.       Routed to Dr. Rivera as FYI.

## 2025-01-08 NOTE — TELEPHONE ENCOUNTER
RN left message for patient and provided Dr. Rivera's recommendations listed below.  Provided call back number if she has any additional questions or concerns.

## 2025-01-08 NOTE — TELEPHONE ENCOUNTER
call reviewed and noted.  Albuterol prescription sent.  Agree with triage advice provided including urgent care evaluation if worsening symptoms

## 2025-01-22 ENCOUNTER — OFFICE VISIT (OUTPATIENT)
Dept: ALLERGY | Facility: CLINIC | Age: 65
End: 2025-01-22

## 2025-01-22 ENCOUNTER — LAB ENCOUNTER (OUTPATIENT)
Dept: LAB | Age: 65
End: 2025-01-22
Attending: ALLERGY & IMMUNOLOGY
Payer: MEDICARE

## 2025-01-22 VITALS — WEIGHT: 140 LBS | BODY MASS INDEX: 22.5 KG/M2 | HEIGHT: 66 IN

## 2025-01-22 DIAGNOSIS — Z87.09 HISTORY OF COPD: Primary | ICD-10-CM

## 2025-01-22 DIAGNOSIS — Z23 FLU VACCINE NEED: ICD-10-CM

## 2025-01-22 DIAGNOSIS — Z23 NEED FOR COVID-19 VACCINE: ICD-10-CM

## 2025-01-22 DIAGNOSIS — H10.10 SEASONAL AND PERENNIAL ALLERGIC RHINOCONJUNCTIVITIS: ICD-10-CM

## 2025-01-22 DIAGNOSIS — J30.2 SEASONAL AND PERENNIAL ALLERGIC RHINOCONJUNCTIVITIS: ICD-10-CM

## 2025-01-22 DIAGNOSIS — J30.89 SEASONAL AND PERENNIAL ALLERGIC RHINOCONJUNCTIVITIS: ICD-10-CM

## 2025-01-22 DIAGNOSIS — R05.9 COUGH: ICD-10-CM

## 2025-01-22 PROCEDURE — 36415 COLL VENOUS BLD VENIPUNCTURE: CPT | Performed by: ALLERGY & IMMUNOLOGY

## 2025-01-22 PROCEDURE — 86003 ALLG SPEC IGE CRUDE XTRC EA: CPT | Performed by: ALLERGY & IMMUNOLOGY

## 2025-01-22 PROCEDURE — 82785 ASSAY OF IGE: CPT | Performed by: ALLERGY & IMMUNOLOGY

## 2025-01-22 PROCEDURE — 99214 OFFICE O/P EST MOD 30 MIN: CPT | Performed by: ALLERGY & IMMUNOLOGY

## 2025-01-22 PROCEDURE — 94010 BREATHING CAPACITY TEST: CPT | Performed by: ALLERGY & IMMUNOLOGY

## 2025-01-22 RX ORDER — ALBUTEROL SULFATE 0.83 MG/ML
2.5 SOLUTION RESPIRATORY (INHALATION) EVERY 4 HOURS PRN
Qty: 30 EACH | Refills: 1 | Status: SHIPPED | OUTPATIENT
Start: 2025-01-22

## 2025-01-22 RX ORDER — OXYCODONE HYDROCHLORIDE 30 MG/1
30 TABLET ORAL EVERY 4 HOURS
COMMUNITY
Start: 2024-12-30

## 2025-01-22 RX ORDER — IBUPROFEN 600 MG/1
TABLET, FILM COATED ORAL
COMMUNITY
Start: 2024-12-19

## 2025-01-22 RX ORDER — AZELASTINE HYDROCHLORIDE 0.5 MG/ML
1 SOLUTION/ DROPS OPHTHALMIC 2 TIMES DAILY
Qty: 18 ML | Refills: 0 | Status: SHIPPED | OUTPATIENT
Start: 2025-01-22

## 2025-01-22 RX ORDER — PREDNISONE 10 MG/1
TABLET ORAL
Qty: 15 TABLET | Refills: 0 | Status: SHIPPED | OUTPATIENT
Start: 2025-01-22

## 2025-01-22 RX ORDER — MONTELUKAST SODIUM 10 MG/1
10 TABLET ORAL EVERY EVENING
Qty: 90 TABLET | Refills: 1 | Status: SHIPPED | OUTPATIENT
Start: 2025-01-22

## 2025-01-22 RX ORDER — ALBUTEROL SULFATE 90 UG/1
2 INHALANT RESPIRATORY (INHALATION) EVERY 4 HOURS PRN
Qty: 8.5 G | Refills: 2 | Status: SHIPPED | OUTPATIENT
Start: 2025-01-22

## 2025-01-22 RX ORDER — BUDESONIDE AND FORMOTEROL FUMARATE DIHYDRATE 80; 4.5 UG/1; UG/1
2 AEROSOL RESPIRATORY (INHALATION) 2 TIMES DAILY
Qty: 3 EACH | Refills: 1 | Status: SHIPPED | OUTPATIENT
Start: 2025-01-22

## 2025-01-22 RX ORDER — ALBUTEROL SULFATE 90 UG/1
2 INHALANT RESPIRATORY (INHALATION) ONCE
Status: SHIPPED | OUTPATIENT
Start: 2025-01-22

## 2025-01-22 RX ORDER — FLUTICASONE PROPIONATE 50 MCG
2 SPRAY, SUSPENSION (ML) NASAL DAILY
Qty: 48 ML | Refills: 2 | Status: SHIPPED | OUTPATIENT
Start: 2025-01-22

## 2025-01-22 NOTE — PATIENT INSTRUCTIONS
#1 COPD  Prior hospitalization earlier this year x 1.    See above spirometry to assess lung function  Symbicort 2 puffs twice a day  Albuterol every 4 hours as needed   consider  adding Spiriva or starting  Breztri in place of Symbcort if refractory       #2allergic rhinitis  Check serum IgE to environmental allergens.  Patient with concern for cat allergen.  Daughter has pets.  Symptoms at her home.   Continue with Singulair and Xyzal  Reviewed avoidance measures and potential treatment option to therapy     #3 flu vaccine recommended and offered high-dose.  Patient defers      #4 recommend COVID-vaccine booster.  Please check with local pharmacy as we do not stock the vaccine in office.  Most recent boosters in September 2024    #5 pneumonia vaccines up-to-date including Pneumovax 23 and Prevnar 20

## 2025-01-22 NOTE — PROGRESS NOTES
Patient is a 64-year-old female who presents for follow-up with a chief complaint of allergies Genny Mc is a 64 year old female.    HPI:   No chief complaint on file.    Patient is a 64-year-old female who presents for follow-up with a chief complaint of allergies  Patient last seen by me in 2023  History of COPD and allergic rhinitis.  Medication list includes Symbicort 80/4.5 Xyzal Singulair albuterol Optivar Flonase      Immunizations reviewed.  COVID-vaccine x 2 doses last in 2021  Pneumonia vaccine up-to-date from 2024  Last flu vaccine on record from     Today patient reports    Ar:  Active or persistent symptoms: runny nose, nc   Worse with cats   Active meds: singulair, xyzal ,   pets  dog       Frontal Sinus issues x 2 weeks  No fever  + MP   + pnd       Copd   1 ed and hosp  overpast year   Meds: symbicort , alb       Prior PFT ordered from PCP in May 2023 is .  Test never performed by patient    Defers flu vaccine             HISTORY:  Past Medical History:    Calculus of kidney    COPD (chronic obstructive pulmonary disease) (HCC)    Diabetes (HCC)    Disorder of thyroid    High cholesterol    Kidney stones    PONV (postoperative nausea and vomiting)    Rotator cuff impingement syndrome of right shoulder      Past Surgical History:   Procedure Laterality Date    Appendectomy      Appendectomy      Blepharoplasty upper skin only - od - right eye Right     [facility]     Blepharoplasty upper skin only - os - left eye Left     [facility]     Colonoscopy N/A 2018    Procedure: COLONOSCOPY;  Surgeon: Juan Marcus MD;  Location: [facility] ENDOSCOPY    Egd  2018    Lithotripsy      Tonsillectomy        Family History   Problem Relation Age of Onset    Heart Disease Father         Details unknown    Hypertension Father     Colon Cancer Maternal Grandmother     Other (Kidney Cancer) Maternal Grandmother     Colon Cancer Other          Nephew    Heart Disease Mother         Details unknown    Hypertension Brother     Colon Polyps Brother     Colon Cancer Cousin     Other (Other) Paternal Aunt         crohns disease,dementia    Diabetes Neg     Glaucoma Neg     Macular degeneration Neg       Social History:   Social History     Socioeconomic History    Marital status:    Tobacco Use    Smoking status: Some Days     Current packs/day: 0.00     Types: Cigarettes     Start date: 1973     Last attempt to quit: 2018     Years since quittin.2    Smokeless tobacco: Never    Tobacco comments:     quit then starts smoking someday   Vaping Use    Vaping status: Never Used   Substance and Sexual Activity    Alcohol use: Never    Drug use: No    Sexual activity: Not Currently   Other Topics Concern    Caffeine Concern Yes     Comment: coffee, 1cup/day    Exercise Yes   Social History Narrative    The patient does not use an assistive device..      The patient does live in a home with stairs.     Social Drivers of Health      [facility]        Medications (Active prior to today's visit):  Current Outpatient Medications   Medication Sig Dispense Refill    ALBUTEROL 108 (90 Base) MCG/ACT Inhalation Aero Soln INHALE 2 PUFFS INTO THE LUNGS EVERY 4 (FOUR) HOURS AS NEEDED FOR WHEEZING. 8.5 g 2    levocetirizine 5 MG Oral Tab Take 1 tablet (5 mg total) by mouth nightly. 90 tablet 0    Azelastine HCl 0.05 % Ophthalmic Solution Apply 1 drop to eye 2 (two) times daily. 18 mL 0    methylPREDNISolone (MEDROL) 4 MG Oral Tablet Therapy Pack As directed. 1 each 0    ergocalciferol 1.25 MG (21268 UT) Oral Cap Take 1 capsule (50,000 Units total) by mouth every 14 (fourteen) days. 6 capsule 3    levothyroxine 88 MCG Oral Tab Take 1 tablet (88 mcg total) by mouth before breakfast. 90 tablet 3    SITagliptin Phosphate (JANUVIA) 100 MG Oral Tab Take 1 tablet (100 mg total) by mouth daily. 90 tablet 3     atorvastatin 10 MG Oral Tab Take 1 tablet (10 mg total) by mouth nightly. 90 tablet 3    FLUoxetine HCl 40 MG Oral Cap Take 1 capsule (40 mg total) by mouth daily. Take along with 20 mg for a total dose of 60 mg 90 capsule 3    SYMBICORT 80-4.5 MCG/ACT Inhalation Aerosol SHAKE BEFORE USE AND INHALE 2 PUFFS INTO THE LUNGS TWICE A DAY 10.2 g 2    nicotine 21 MG/24HR Transdermal Patch 24 Hr Place 1 patch onto the skin daily. 30 patch 2    levothyroxine 88 MCG Oral Tab Take 1 tablet (88 mcg total) by mouth before breakfast. 90 tablet 0    atorvastatin 10 MG Oral Tab Take 1 tablet (10 mg total) by mouth every other day. 90 tablet 1    fluticasone propionate 50 MCG/ACT Nasal Suspension 2 sprays by Nasal route daily. 48 mL 2    hydrocortisone 2.5 % External Cream Apply bid to involved areas as needed 60 g 0    montelukast 10 MG Oral Tab Take 1 tablet (10 mg total) by mouth every evening. 90 tablet 1    prochlorperazine 5 MG Oral Take 1 tablet (5 mg total) by mouth nightly as needed for Nausea. 30 tablet 1    Lancets (ONETOUCH DELICA PLUS [id number]) Does not apply Misc 1 each by In Vitro route 3 (three) times daily. Use test strip to check blood sugar 3 times per day 300 each 0    Glucose Blood (ONETOUCH VERIO) In Vitro Strip Use test strips to check blood sugar 3 times per day. 300 strip 0    Glucose Blood (ONETOUCH VERIO) In Vitro Strip TEST BLOOD SUGAR THREE TIMES DAILY AS NEEDED 300 strip 11    gabapentin 800 MG Oral Tab Take 1 tablet (800 mg total) by mouth 3 (three) times daily.      zolpidem 10 MG Oral Tab Take 1 tablet (10 mg total) by mouth nightly as needed. AT BEDTIME      NARCAN 4 MG/0.1ML Nasal Liquid       EZETIMIBE 10 MG Oral Tab TAKE 1 TABLET (10 MG TOTAL) BY MOUTH NIGHTLY. 90 tablet 0    ondansetron 4 MG Oral Tablet Dispersible Take 1-2 tablets (4-8 mg total) by mouth 2 (two) times daily as needed for Nausea. 60 tablet 11    clonazePAM 0.5 MG Oral Tab Take 1 tablet (0.5 mg total) by mouth nightly as needed.       hydrOXYzine Pamoate 25 MG Oral Cap Take 1 capsule (25 mg total) by mouth nightly as needed (allergies). 30 capsule 5    Blood Glucose Monitoring Suppl Supplies Does not apply Misc Please supply patient with glucose meter, test strips and lancets covered by her insurance, test TID 1 kit 0       Allergies:  Allergies[1]      ROS:   Allergic/Immuno:  See hpi  Cardiovascular:  Negative for irregular heartbeat/palpitations, chest pain, edema  Constitutional:  Negative night sweats,weight loss, irritability and lethargy  ENMT:  Negative for ear drainage, hearing loss and nasal drainage  Eyes:  Negative for eye discharge and vision loss  Gastrointestinal:  Negative for abdominal pain, diarrhea and vomiting  Integumentary:  Negative for pruritus and rash  Respiratory:  Negative for cough, dyspnea and wheezing    PHYSICAL EXAM:   Constitutional: responsive, no acute distress noted  Head/Face: NC/Atraumatic  Eyes/Vision: conjunctiva and lids are normal extraocular motion is intact   Ears/Audiometry: tympanic membranes are normal bilaterally hearing is grossly intact  Nose/Mouth/Throat: nose and throat are clear mucous membranes are moist   Neck/Thyroid: neck is supple without adenopathy  Lymphatic: no abnormal cervical, supraclavicular or axillary adenopathy is noted  Respiratory: normal to inspection lungs are clear to auscultation bilaterally normal respiratory effort   Cardiovascular: regular rate and rhythm no murmurs, gallups, or rubs  Abdomen: soft non-tender non-distended  Skin/Hair: no unusual rashes present   Extremities: no edema, cyanosis, or clubbing     ASSESSMENT/PLAN:   Assessment   Encounter Diagnoses   Name Primary?    History of COPD Yes    Seasonal and perennial allergic rhinoconjunctivitis     Flu vaccine need     Need for COVID-19 vaccine        Spirometry today shows an FEV1  92% and %  normal   Post deferred  I was unable to close the visit without deleting the spirometry order as the  spirometry test results never popped up after the nurse closed the spirometry encounter since the update from yesterday January 21, 2025      #1 COPD  Prior hospitalization earlier this year x 1.    See above spirometry to assess lung function  Symbicort 2 puffs twice a day  Albuterol every 4 hours as needed   consider  adding Spiriva or starting  Breztri in place of Symbcort if refractory       #2allergic rhinitis  Check serum IgE to environmental allergens.  Patient with concern for cat allergen.  Daughter has pets.  Symptoms at her home.   Continue with Singulair and Xyzal  Reviewed avoidance measures and potential treatment option to therapy     #3 flu vaccine recommended and offered high-dose.  Patient defers      #4 recommend COVID-vaccine booster.  Please check with local pharmacy as we do not stock the vaccine in office.  Most recent boosters in September 2024    #5 pneumonia vaccines up-to-date including Pneumovax 23 and Prevnar 20                       Orders This Visit:  No orders of the defined types were placed in this encounter.      Meds This Visit:  Requested Prescriptions      No prescriptions requested or ordered in this encounter       Imaging & Referrals:  None     1/22/2025  Kevin Rivera MD    If medication samples were provided today, they were provided solely for patient education and training related to self administration of these medications.  Teaching, instruction and sample was provided to the patient by myself.  Teaching included  a review of potential adverse side effects as well as potential efficacy.  Patient's questions were answered in regards to medication administration and dosing and potential side effects. Teaching was provided via the teach back method           [1]   Allergies  Allergen Reactions    Radiology Contrast Iodinated Dyes HIVES, SWELLING and SHORTNESS OF BREATH     Had some kind of contrast many years ago with an x-ray.     Other NAUSEA ONLY     PO antibiotics

## 2025-01-23 LAB
FEF 25-75%: 1.78 L/S
FET: 7.1 S
FEV1/FVC: 0.72
FEV1: 2.19 L
FVC: 3.06 L
PEF: 225 L/MIN

## 2025-01-24 LAB

## 2025-01-27 ENCOUNTER — TELEPHONE (OUTPATIENT)
Dept: ALLERGY | Facility: CLINIC | Age: 65
End: 2025-01-27

## 2025-01-27 RX ORDER — ALBUTEROL SULFATE 90 UG/1
INHALANT RESPIRATORY (INHALATION)
Qty: 1 EACH | Refills: 0 | Status: SHIPPED | OUTPATIENT
Start: 2025-01-27

## 2025-01-27 NOTE — TELEPHONE ENCOUNTER
Pt contacted, confirmed name, and . Pt verbalizes understanding, and denies further questions in regards to IgE test results.      Patient also mentioned they were concerned about food allergies. Per patient \"feels sick\" after drinking iced tea. Patient denies rashes or hives after drinking tea.     RN advised that for a food allergy there would be hives/rash, trouble breathing, etc. between 30 min- 2 hours after eating or drinking. Patient denies symptoms. RN advised most likely intolerance.     Patient did mention random hives on neck in the past, did not know what she was doing that could possibly trigger them, resolved on their own.    Patient denies any rashes, hives, other symptoms currently and has no other concerns.    RN also advised patient to keep a food diary and schedule a follow up if needed.     Patient declined making follow up appointment for now and states they would contact us if hives occurred again.

## 2025-01-27 NOTE — TELEPHONE ENCOUNTER
----- Message from Kevin Rivera sent at 1/24/2025 10:36 AM CST -----  Please contact patient with serum IgE testing to cats more so than dog.  Remaining allergens were negative

## 2025-01-27 NOTE — TELEPHONE ENCOUNTER
Prior Authorization Next Steps    Your information has been submitted to [employer]. [employer] will review the request and will issue a decision, typically within 3-7 days from your submission. You can check the updated outcome later by reopening this request.    If [employer] has not responded in 3-7 days or if you have any questions about your ePA request, please contact [employer] at [phone]. If you think there may be a problem with your PA request, use our live chat feature at the bottom right.    For [place] requests, please call [phone].

## 2025-01-27 NOTE — TELEPHONE ENCOUNTER
Spoke to pharmacist. They will need to order prescription. They will be able to dispense tomorrow. Pharmacist reports it goes through insurance without problem.     Scan of denial sent to chart.   No further action needed.

## 2025-01-27 NOTE — TELEPHONE ENCOUNTER
Prior authorization for   Medication Quantity Refills Start End   albuterol 108 (90 Base) MCG/ACT Inhalation Aero Soln 8.5 g 2 1/22/2025 --   Sig:   Inhale 2 puffs into the lungs every 4 (four) hours as needed for Wheezing.     Route:   Inhalation     PRN Reason(s):   Wheezing     Order #:   [id number]       Complete via cover my meds.   KEY: [id number]

## 2025-01-27 NOTE — TELEPHONE ENCOUNTER
[employer] denied Albuterol HFA (Proair).      The drugs on [employer] drug list include: Ventolin HFA aerosol inhaler.     Dr. Rivera please approve Ventolin HFA. Order pended.

## 2025-02-18 ENCOUNTER — TELEPHONE (OUTPATIENT)
Dept: INTERNAL MEDICINE CLINIC | Facility: CLINIC | Age: 65
End: 2025-02-18

## 2025-03-04 ENCOUNTER — TELEPHONE (OUTPATIENT)
Dept: INTERNAL MEDICINE CLINIC | Facility: CLINIC | Age: 65
End: 2025-03-04

## 2025-03-19 ENCOUNTER — OFFICE VISIT (OUTPATIENT)
Dept: INTERNAL MEDICINE CLINIC | Facility: CLINIC | Age: 65
End: 2025-03-19

## 2025-03-19 VITALS
RESPIRATION RATE: 17 BRPM | WEIGHT: 138 LBS | SYSTOLIC BLOOD PRESSURE: 124 MMHG | BODY MASS INDEX: 22.18 KG/M2 | HEIGHT: 66 IN | OXYGEN SATURATION: 99 % | DIASTOLIC BLOOD PRESSURE: 69 MMHG | TEMPERATURE: 98 F | HEART RATE: 64 BPM

## 2025-03-19 DIAGNOSIS — F17.200 SMOKER: ICD-10-CM

## 2025-03-19 DIAGNOSIS — M54.41 CHRONIC RIGHT-SIDED LOW BACK PAIN WITH RIGHT-SIDED SCIATICA: ICD-10-CM

## 2025-03-19 DIAGNOSIS — E03.9 HYPOTHYROIDISM, UNSPECIFIED TYPE: ICD-10-CM

## 2025-03-19 DIAGNOSIS — Z00.00 MEDICARE ANNUAL WELLNESS VISIT, SUBSEQUENT: Primary | ICD-10-CM

## 2025-03-19 DIAGNOSIS — Z71.6 ENCOUNTER FOR SMOKING CESSATION COUNSELING: ICD-10-CM

## 2025-03-19 DIAGNOSIS — E03.9 ACQUIRED HYPOTHYROIDISM: ICD-10-CM

## 2025-03-19 DIAGNOSIS — E78.00 HYPERCHOLESTEROLEMIA: ICD-10-CM

## 2025-03-19 DIAGNOSIS — Z00.00 ENCOUNTER FOR PREVENTIVE CARE: ICD-10-CM

## 2025-03-19 DIAGNOSIS — E11.65 TYPE 2 DIABETES MELLITUS WITH HYPERGLYCEMIA, WITHOUT LONG-TERM CURRENT USE OF INSULIN (HCC): ICD-10-CM

## 2025-03-19 DIAGNOSIS — R61 HYPERHIDROSIS: ICD-10-CM

## 2025-03-19 DIAGNOSIS — G89.29 CHRONIC RIGHT-SIDED LOW BACK PAIN WITH RIGHT-SIDED SCIATICA: ICD-10-CM

## 2025-03-19 DIAGNOSIS — F41.8 DEPRESSION WITH ANXIETY: ICD-10-CM

## 2025-03-19 DIAGNOSIS — R09.81 SINUS CONGESTION: ICD-10-CM

## 2025-03-19 DIAGNOSIS — E55.9 VITAMIN D DEFICIENCY: ICD-10-CM

## 2025-03-19 DIAGNOSIS — J43.9 PULMONARY EMPHYSEMA, UNSPECIFIED EMPHYSEMA TYPE (HCC): ICD-10-CM

## 2025-03-19 DIAGNOSIS — H25.13 AGE-RELATED NUCLEAR CATARACT OF BOTH EYES: ICD-10-CM

## 2025-03-19 DIAGNOSIS — N95.1 HOT FLASHES DUE TO MENOPAUSE: ICD-10-CM

## 2025-03-19 PROCEDURE — 99499 UNLISTED E&M SERVICE: CPT | Performed by: INTERNAL MEDICINE

## 2025-03-19 PROCEDURE — G0438 PPPS, INITIAL VISIT: HCPCS | Performed by: INTERNAL MEDICINE

## 2025-03-21 ENCOUNTER — TELEPHONE (OUTPATIENT)
Dept: INTERNAL MEDICINE CLINIC | Facility: CLINIC | Age: 65
End: 2025-03-21

## 2025-03-21 NOTE — TELEPHONE ENCOUNTER
Verified name and .    Patient was seen during office visit with Dr. Vargas on 3/19/25 during which sinus congestion was addressed.    She states that she continues to have sinus congestion but also now has cough with light green and yellow mucus production, sinus pain, and teeth pain.    She denies any difficulty breathing, chest pain, or fevers at this time.    She states he is currently out of town in [place] due to family emergency and is requesting prescriptions for antibiotics and cough medicine to be sent to the [facility] on file in [place], [place].    She states that Z-packs normally do not help her with these types of symptoms.    Please advise and thank you.

## 2025-03-21 NOTE — TELEPHONE ENCOUNTER
Left detailed voice message on cell telephone number with Dr Vargas message  HIPAA verified.  Left message that Dr will send Prescription as she requested  Provided Office phone number provided with office telephone hours If patient has any questions

## 2025-03-22 NOTE — PROGRESS NOTES
Subjective:   Genny Mc is a 64 year old female who presents for a Initial Annual Wellness Visit (outside the first 12 months of Medicare eligibility, no prior AWV) and .     Patient comes in for Medicare annual continues to smoke trying to cut down up-to-date with CT lung complains today of excessive sweating asking for pretreatment  History/Other:   Fall Risk Assessment:   She has been screened for Falls and is low risk.      Cognitive Assessment:   She had a completely normal cognitive assessment - see flowsheet entries     Functional Ability/Status:   Genny Mc has a completely normal functional assessment. See flowsheet for details.      Depression Screening (PHQ):  PHQ-2 SCORE: 0  , done 3/19/2025   If you checked off any problems, how difficult have these problems made it for you to do your work, take care of things at home, or get along with other people?: Not difficult at all    Last [place] Suicide Screening on 3/19/2025 was No Risk.          Advanced Directives:   She does NOT have a Living Will. [Do you have a living will?: No]  She does NOT have a Power of  for Health Care. [Do you have a healthcare power of ?: No]  Discussed Advance Care Planning with patient (and family/surrogate if present). Standard forms made available to patient in After Visit Summary.      Patient Active Problem List   Diagnosis    Renal calculi    Hypercholesterolemia    Acquired hypothyroidism    Chronic right-sided low back pain with right-sided sciatica    Type 2 diabetes mellitus with hyperglycemia, without long-term current use of insulin (HCC)    Chronic right shoulder pain    Rotator cuff impingement syndrome of right shoulder    Glaucoma suspect, bilateral    Age-related nuclear cataract of both eyes    Insomnia    Statin intolerance    Diabetes mellitus (HCC)    Neck pain    Right ear pain    Chronic pain of right thumb    Smoker    Fatigue    Moderate recurrent major depression (HCC)     Diabetic autonomic neuropathy associated with type 2 diabetes mellitus (HCC)    Ophthalmic migraine    Dermatochalasis of both upper eyelids    Floaters in visual field, bilateral    Vitamin D deficiency    Chest pressure    Pulmonary emphysema (HCC)     Allergies:  She is allergic to radiology contrast iodinated dyes and other.    Current Medications:  Outpatient Medications Marked as Taking for the 3/19/25 encounter (Office Visit) with Franck Vargas MD   Medication Sig    Aluminum Chloride 20 % External Solution Apply 1 Application topically nightly. Apply to affected area  nightly once excessive sweating decreases usually after 2-3 treatments then can do it once or twice a week    albuterol 108 (90 Base) MCG/ACT Inhalation Aero Soln Inhale 2 puffs by inhalation route every 4 - 6 hours as needed    ibuprofen 600 MG Oral Tab TAKE 1 TABLET BY MOUTH DAILY WITH FOOD OR MILK AS NEEDED    OxyCODONE HCl IR 30 MG Oral Tab Take 1 tablet (30 mg total) by mouth every 4 (four) hours.    Azelastine HCl 0.05 % Ophthalmic Solution Apply 1 drop to eye 2 (two) times daily.    montelukast 10 MG Oral Tab Take 1 tablet (10 mg total) by mouth every evening.    fluticasone propionate 50 MCG/ACT Nasal Suspension 2 sprays by Nasal route daily.    albuterol 108 (90 Base) MCG/ACT Inhalation Aero Soln Inhale 2 puffs into the lungs every 4 (four) hours as needed for Wheezing.    albuterol (2.5 MG/3ML) 0.083% Inhalation Nebu Soln Take 3 mL (2.5 mg total) by nebulization every 4 (four) hours as needed for Wheezing.    Budesonide-Formoterol Fumarate (SYMBICORT) 80-4.5 MCG/ACT Inhalation Aerosol Inhale 2 puffs into the lungs 2 (two) times daily.    levocetirizine 5 MG Oral Tab Take 1 tablet (5 mg total) by mouth nightly.    levothyroxine 88 MCG Oral Tab Take 1 tablet (88 mcg total) by mouth before breakfast.    SITagliptin Phosphate (JANUVIA) 100 MG Oral Tab Take 1 tablet (100 mg total) by mouth daily.    atorvastatin 10 MG Oral Tab Take 1  tablet (10 mg total) by mouth nightly.    FLUoxetine HCl 40 MG Oral Cap Take 1 capsule (40 mg total) by mouth daily. Take along with 20 mg for a total dose of 60 mg    atorvastatin 10 MG Oral Tab Take 1 tablet (10 mg total) by mouth every other day.    prochlorperazine 5 MG Oral Take 1 tablet (5 mg total) by mouth nightly as needed for Nausea.    Lancets (ONETOUCH DELICA PLUS [id number]) Does not apply Misc 1 each by In Vitro route 3 (three) times daily. Use test strip to check blood sugar 3 times per day    Glucose Blood (ONETOUCH VERIO) In Vitro Strip Use test strips to check blood sugar 3 times per day.    Glucose Blood (ONETOUCH VERIO) In Vitro Strip TEST BLOOD SUGAR THREE TIMES DAILY AS NEEDED    gabapentin 800 MG Oral Tab Take 1 tablet (800 mg total) by mouth 3 (three) times daily.    zolpidem 10 MG Oral Tab Take 1 tablet (10 mg total) by mouth nightly as needed. AT BEDTIME    EZETIMIBE 10 MG Oral Tab TAKE 1 TABLET (10 MG TOTAL) BY MOUTH NIGHTLY.    ondansetron 4 MG Oral Tablet Dispersible Take 1-2 tablets (4-8 mg total) by mouth 2 (two) times daily as needed for Nausea.    clonazePAM 0.5 MG Oral Tab Take 1 tablet (0.5 mg total) by mouth nightly as needed.    hydrOXYzine Pamoate 25 MG Oral Cap Take 1 capsule (25 mg total) by mouth nightly as needed (allergies).    Blood Glucose Monitoring Suppl Supplies Does not apply Misc Please supply patient with glucose meter, test strips and lancets covered by her insurance, test TID     Current Facility-Administered Medications for the 3/19/25 encounter (Office Visit) with Franck Vargas MD   Medication    albuterol (Ventolin HFA) 108 (90 Base) MCG/ACT inhaler 2 puff       Medical History:  She  has a past medical history of Calculus of kidney, COPD (chronic obstructive pulmonary disease) (HCC), Diabetes (HCC), Disorder of thyroid, High cholesterol, Kidney stones, PONV (postoperative nausea and vomiting), and Rotator cuff impingement syndrome of right shoulder  (2017).  Surgical History:  She  has a past surgical history that includes appendectomy; tonsillectomy; lithotripsy; colonoscopy (N/A, 2018); appendectomy; egd (2018); Blepharoplasty Upper Skin Only - OD - Right Eye (Right, 2017); and Blepharoplasty Upper Skin Only - OS - Left Eye (Left, 2017).   Family History:  Her family history includes Colon Cancer in her cousin, maternal grandmother, and another family member; Colon Polyps in her brother; Heart Disease in her father and mother; Hypertension in her brother and father; Kidney Cancer in her maternal grandmother; Other in her paternal aunt.  Social History:  She  reports that she has been smoking cigarettes. She started smoking about 51 years ago. She has never been exposed to tobacco smoke. She has never used smokeless tobacco. She reports that she does not drink alcohol and does not use drugs.    Tobacco:  Social History     Tobacco Use   Smoking Status Some Days    Current packs/day: 0.00    Types: Cigarettes    Start date: 1973    Last attempt to quit: 2018    Years since quittin.3    Passive exposure: Never (6 daily)   Smokeless Tobacco Never   Tobacco Comments    quit then starts smoking someday     E-Cigarettes/Vaping       Questions Responses    E-Cigarette Use Never User    Passive Exposure No          E-Cigarette/Vaping Substances       Questions Responses    Nicotine No    THC No    CBD No    Flavoring No          E-Cigarette/Vaping Devices       Questions Responses    Disposable No    Pre-filled or Refillable Cartridge No    Refillable Tank No    Pre-filled Pod No           Tobacco cessation counseling For >10 minutes (add E/M code #72756).      CAGE Alcohol Screen:   CAGE screening score of 0 on 3/19/2025, showing low risk of alcohol abuse.      Patient Care Team:  Franck Vargas MD as PCP - General (Internal Medicine)  Joaquín Lara DO as Consulting Physician (Physical Medicine)  Miguel Ángel Najera MD (OBSTETRICS &  GYNECOLOGY)    Review of Systems   Constitutional: Negative.    HENT: Negative.     Eyes: Negative.    Respiratory: Negative.     Cardiovascular: Negative.    Gastrointestinal: Negative.    Endocrine:        Excessive  sweating   Genitourinary: Negative.    Musculoskeletal: Negative.    Skin: Negative.    Neurological: Negative.    Psychiatric/Behavioral: Negative.            Objective:   Physical Exam  Vitals and nursing note reviewed.   Constitutional:       Appearance: She is well-developed.   HENT:      Head: Normocephalic and atraumatic.      Right Ear: External ear normal.      Left Ear: External ear normal.      Nose: Nose normal.   Eyes:      Conjunctiva/sclera: Conjunctivae normal.      Pupils: Pupils are equal, round, and reactive to light.   Cardiovascular:      Rate and Rhythm: Normal rate and regular rhythm.      Heart sounds: Normal heart sounds.   Pulmonary:      Effort: Pulmonary effort is normal.      Breath sounds: Normal breath sounds.   Abdominal:      General: Bowel sounds are normal.      Palpations: Abdomen is soft.   Genitourinary:     Vagina: Normal.   Musculoskeletal:         General: Normal range of motion.      Cervical back: Normal range of motion and neck supple.   Skin:     General: Skin is warm and dry.      Comments: Excessive  sweating   Neurological:      Mental Status: She is alert and oriented to person, place, and time.      Deep Tendon Reflexes: Reflexes are normal and symmetric.   Psychiatric:         Behavior: Behavior normal.         Thought Content: Thought content normal.         Judgment: Judgment normal.            /69 (BP Location: Left arm, Patient Position: Sitting, Cuff Size: adult)   Pulse 64   Temp 98 °F (36.7 °C) (Oral)   Resp 17   Ht 5' 6\" (1.676 m)   Wt 138 lb (62.6 kg)   SpO2 99%   BMI 22.27 kg/m²  Estimated body mass index is 22.27 kg/m² as calculated from the following:    Height as of this encounter: 5' 6\" (1.676 m).    Weight as of this  encounter: 138 lb (62.6 kg).    Medicare Hearing Assessment:   Hearing Screening    Screening Method: Questionnaire  I have a problem hearing over the telephone: No I have trouble following the conversations when two or more people are talking at the same time: No   I have trouble understanding things on the TV: No I have to strain to understand conversations: No   I have to worry about missing the telephone ring or doorbell: No I have trouble hearing conversations in a noisy background such as a crowded room or restaurant: No   I get confused about where sounds come from: No I misunderstand some words in a sentence and need to ask people to repeat themselves: No   I especially have trouble understanding the speech of women and children: No I have trouble understanding the speaker in a large room such as at a meeting or place of Gnosticist: No   Many people I talk to seem to mumble (or don't speak clearly): No People get annoyed because I misunderstand what they say: No   I misunderstand what others are saying and make inappropriate responses: No I avoid social activities because I cannot hear well and fear I will reply improperly: No   Family members and friends have told me they think I may have hearing loss: No             Visual Acuity:   Right Eye Visual Acuity: Corrected Right Eye Chart Acuity: 20/20   Left Eye Visual Acuity: Corrected Left Eye Chart Acuity: 20/20   Both Eyes Visual Acuity: Corrected Both Eyes Chart Acuity: 20/20   Able To Tolerate Visual Acuity: Yes        Assessment & Plan:   Genny Mc is a 64 year old female who presents for a Medicare Assessment.     1. Medicare annual wellness visit, subsequent (Primary)-exam is okay will order labs  -     CBC With Differential With Platelet; Future; Expected date: 03/19/2025  -     Comp Metabolic Panel (14); Future; Expected date: 03/19/2025  -     Lipid Panel; Future; Expected date: 03/19/2025  -     TSH W Reflex To Free T4; Future; Expected date:  03/19/2025  -     Urinalysis, Routine; Future; Expected date: 03/19/2025  -     Hemoglobin A1C; Future; Expected date: 03/19/2025  -     Microalb/Creat Ratio, Random Urine; Future; Expected date: 03/19/2025  2. Hyperhidrosis will treat with aluminum chloride   -     CBC With Differential With Platelet; Future; Expected date: 03/19/2025  -     Comp Metabolic Panel (14); Future; Expected date: 03/19/2025  -     Lipid Panel; Future; Expected date: 03/19/2025  -     TSH W Reflex To Free T4; Future; Expected date: 03/19/2025  -     Urinalysis, Routine; Future; Expected date: 03/19/2025  -     Hemoglobin A1C; Future; Expected date: 03/19/2025  -     Microalb/Creat Ratio, Random Urine; Future; Expected date: 03/19/2025  3. Encounter for smoking cessation counseling patient says she is cut down on smoking not ready to quit completely will let us know if she needs help  -     CBC With Differential With Platelet; Future; Expected date: 03/19/2025  -     Comp Metabolic Panel (14); Future; Expected date: 03/19/2025  -     Lipid Panel; Future; Expected date: 03/19/2025  -     TSH W Reflex To Free T4; Future; Expected date: 03/19/2025  -     Urinalysis, Routine; Future; Expected date: 03/19/2025  -     Hemoglobin A1C; Future; Expected date: 03/19/2025  -     Microalb/Creat Ratio, Random Urine; Future; Expected date: 03/19/2025  4. Sinus congestion as needed antihistamine if not better let us know may need to see ENT  -     CBC With Differential With Platelet; Future; Expected date: 03/19/2025  -     Comp Metabolic Panel (14); Future; Expected date: 03/19/2025  -     Lipid Panel; Future; Expected date: 03/19/2025  -     TSH W Reflex To Free T4; Future; Expected date: 03/19/2025  -     Urinalysis, Routine; Future; Expected date: 03/19/2025  -     Hemoglobin A1C; Future; Expected date: 03/19/2025  -     Microalb/Creat Ratio, Random Urine; Future; Expected date: 03/19/2025  5. Age-related nuclear cataract of both eyes follow-up with  eye specialist  -     Ophthalmology Referral - In Network  -     CBC With Differential With Platelet; Future; Expected date: 03/19/2025  -     Comp Metabolic Panel (14); Future; Expected date: 03/19/2025  -     Lipid Panel; Future; Expected date: 03/19/2025  -     TSH W Reflex To Free T4; Future; Expected date: 03/19/2025  -     Urinalysis, Routine; Future; Expected date: 03/19/2025  -     Hemoglobin A1C; Future; Expected date: 03/19/2025  -     Microalb/Creat Ratio, Random Urine; Future; Expected date: 03/19/2025  6. Hypercholesterolemia will retest watch diet  -     CBC With Differential With Platelet; Future; Expected date: 03/19/2025  -     Comp Metabolic Panel (14); Future; Expected date: 03/19/2025  -     Lipid Panel; Future; Expected date: 03/19/2025  -     TSH W Reflex To Free T4; Future; Expected date: 03/19/2025  -     Urinalysis, Routine; Future; Expected date: 03/19/2025  -     Hemoglobin A1C; Future; Expected date: 03/19/2025  -     Microalb/Creat Ratio, Random Urine; Future; Expected date: 03/19/2025  7. Hot flashes due to menopause follow-up with OB  -     CBC With Differential With Platelet; Future; Expected date: 03/19/2025  -     Comp Metabolic Panel (14); Future; Expected date: 03/19/2025  -     Lipid Panel; Future; Expected date: 03/19/2025  -     TSH W Reflex To Free T4; Future; Expected date: 03/19/2025  -     Urinalysis, Routine; Future; Expected date: 03/19/2025  -     Hemoglobin A1C; Future; Expected date: 03/19/2025  -     Microalb/Creat Ratio, Random Urine; Future; Expected date: 03/19/2025  8. Vitamin D deficiency continue current treatment we will retest  -     CBC With Differential With Platelet; Future; Expected date: 03/19/2025  -     Comp Metabolic Panel (14); Future; Expected date: 03/19/2025  -     Lipid Panel; Future; Expected date: 03/19/2025  -     TSH W Reflex To Free T4; Future; Expected date: 03/19/2025  -     Urinalysis, Routine; Future; Expected date: 03/19/2025  -      Hemoglobin A1C; Future; Expected date: 03/19/2025  -     Microalb/Creat Ratio, Random Urine; Future; Expected date: 03/19/2025  9. Depression with anxiety stable medical management denies any suicidal homicidal thoughts  -     CBC With Differential With Platelet; Future; Expected date: 03/19/2025  -     Comp Metabolic Panel (14); Future; Expected date: 03/19/2025  -     Lipid Panel; Future; Expected date: 03/19/2025  -     TSH W Reflex To Free T4; Future; Expected date: 03/19/2025  -     Urinalysis, Routine; Future; Expected date: 03/19/2025  -     Hemoglobin A1C; Future; Expected date: 03/19/2025  -     Microalb/Creat Ratio, Random Urine; Future; Expected date: 03/19/2025  10. Type 2 diabetes mellitus with hyperglycemia, without long-term current use of insulin ([facility]) that sugars on the higher side last A1c 7.8 follows with endocrine watch diet closely take medication  -     Ophthalmology Referral - In Network  -     CBC With Differential With Platelet; Future; Expected date: 03/19/2025  -     Comp Metabolic Panel (14); Future; Expected date: 03/19/2025  -     Lipid Panel; Future; Expected date: 03/19/2025  -     TSH W Reflex To Free T4; Future; Expected date: 03/19/2025  -     Urinalysis, Routine; Future; Expected date: 03/19/2025  -     Hemoglobin A1C; Future; Expected date: 03/19/2025  -     Microalb/Creat Ratio, Random Urine; Future; Expected date: 03/19/2025  -     Endocrine Referral - In Network  11. Encounter for preventive care will refer to GI  -     Gastro Referral - In Network  12. Hypothyroidism, unspecified type follow-up with endocrine continue current treatment  -     Endocrine Referral - In Network  13. Pulmonary emphysema, unspecified emphysema type (HCC) continues to smoke not ready to quit    15. Chronic right-sided low back pain with right-sided sciatica Alexandra medication for pain  16. Smoker up-to-date with CT lungs says she is cut down but not ready to quit completely  Other orders  -      Aluminum Chloride; Apply 1 Application topically nightly. Apply to affected area  nightly once excessive sweating decreases usually after 2-3 treatments then can do it once or twice a week  Dispense: 60 mL; Refill: 0    The patient indicates understanding of these issues and agrees to the plan.  Reinforced healthy diet, lifestyle, and exercise.      No follow-ups on file.     Franck Vargas MD, 3/22/2025     Supplementary Documentation:   General Health:  In the past six months, have you lost more than 10 pounds without trying?: 2 - No  Has your appetite been poor?: No  Type of Diet: Balanced  How does the patient maintain a good energy level?: Appropriate Exercise  How would you describe your daily physical activity?: Light  How would you describe your current health state?: Good  How do you maintain positive mental well-being?: Visiting Family, Visiting Friends  On a scale of 0 to 10, with 0 being no pain and 10 being severe pain, what is your pain level?: 0 - (None)  In the past six months, have you experienced urine leakage?: 0-No  At any time do you feel concerned for the safety/well-being of yourself and/or your children, in your home or elsewhere?: No  Have you had any immunizations at another office such as Influenza, Hepatitis B, Tetanus, or Pneumococcal?: No    Health Maintenance   Topic Date Due    Annual Physical  Never done    Zoster Vaccines (1 of 2) Never done    Mammogram  02/04/2021    Diabetes Care Dilated Eye Exam  08/04/2023    DEXA Scan  Never done    Colorectal Cancer Screening  11/20/2023    COVID-19 Vaccine (3 - 2024-25 season) 09/01/2024    Influenza Vaccine (1) 10/01/2024    Diabetes Care: Foot Exam (Annual)  01/01/2025    Tobacco Cessation Counseling  01/01/2025    Diabetes Care: Microalb/Creat Ratio (Annual)  01/01/2025    Diabetes Care A1C  04/21/2025    Diabetes Care: GFR  11/02/2025    Annual Depression Screening  Completed    Fall Risk Screening (Annual)  Completed    Pneumococcal  Vaccine: 50+ Years  Completed    Meningococcal B Vaccine  Aged Out

## 2025-03-24 RX ORDER — AMOXICILLIN 875 MG/1
875 TABLET, COATED ORAL 2 TIMES DAILY
Qty: 20 TABLET | Refills: 0 | Status: SHIPPED | OUTPATIENT
Start: 2025-03-24 | End: 2025-04-03

## 2025-03-24 RX ORDER — BENZONATATE 100 MG/1
100 CAPSULE ORAL 3 TIMES DAILY PRN
Qty: 20 CAPSULE | Refills: 0 | Status: SHIPPED | OUTPATIENT
Start: 2025-03-24 | End: 2025-03-24

## 2025-03-24 RX ORDER — BENZONATATE 100 MG/1
100 CAPSULE ORAL 3 TIMES DAILY PRN
Qty: 20 CAPSULE | Refills: 0 | Status: SHIPPED | OUTPATIENT
Start: 2025-03-24 | End: 2025-03-31

## 2025-03-24 RX ORDER — AMOXICILLIN 875 MG/1
875 TABLET, COATED ORAL 2 TIMES DAILY
Qty: 20 TABLET | Refills: 0 | Status: SHIPPED | OUTPATIENT
Start: 2025-03-24 | End: 2025-03-24

## 2025-03-24 NOTE — TELEPHONE ENCOUNTER
Patient called back, verified Name and . [facility] a nurse left her a message about a prescription from PCP that will be sent to the pharmacy in [place] for sinus congestion. [facility] she followed up with pharmacy but was told there was no new prescription from provider.     She is going back to [place] on either Wednesday or Thursday, and does not want to fly back without taking any medication for congestion, as pressure in her ears is worse when flying.     [id number] [place], [place] - [address] AT [facility], [phone], [phone] [[id number]]     Dr. Franck Vargas please advise.

## 2025-03-24 NOTE — TELEPHONE ENCOUNTER
Left detailed message  ( per Release of Information ) that 2 RX have been sent to the [facility] in [place], [place]    Advised to call back with any questions/ concerns    Office phone number provided with office telephone hours.

## 2025-04-01 DIAGNOSIS — R11.0 CHRONIC NAUSEA: ICD-10-CM

## 2025-04-01 RX ORDER — PROCHLORPERAZINE MALEATE 5 MG/1
5 TABLET ORAL NIGHTLY PRN
Qty: 30 TABLET | Refills: 1 | Status: SHIPPED | OUTPATIENT
Start: 2025-04-01

## 2025-04-01 NOTE — TELEPHONE ENCOUNTER
Requested Prescriptions     Pending Prescriptions Disp Refills    PROCHLORPERAZINE 5 MG Oral [Pharmacy Med Name: PROCHLORPERAZINE MALEATE 5MG TABLET] 30 tablet 1     Sig: TAKE 1 TABLET (5 MG TOTAL) BY MOUTH NIGHTLY AS NEEDED FOR NAUSEA.      Lr: 9/10/2023     Qty: 30 tablet     refills:1    Lov: 4/16/2021     Clinic on call: DEVIKA Paul Jd    Next Appt: With Gastroenterology (Juan Marcus MD)  05/05/2025 at 11:00 AM

## 2025-04-08 RX ORDER — LEVOCETIRIZINE DIHYDROCHLORIDE 5 MG/1
5 TABLET, FILM COATED ORAL NIGHTLY
Qty: 90 TABLET | Refills: 1 | Status: SHIPPED | OUTPATIENT
Start: 2025-04-08

## 2025-04-08 NOTE — TELEPHONE ENCOUNTER
Refill requested for    Name from pharmacy: LEVOCETIRIZINE DIHYDROCHLORIDE 5MG TABLET         Will file in chart as: LEVOCETIRIZINE 5 MG Oral Tab    Sig: Take 1 tablet (5 mg total) by mouth nightly.    Disp: 90 tablet    Refills: 2    Start: 4/8/2025    Class: Normal    Non-formulary    Last ordered: 3 months ago (1/7/2025) by Kevin Rivera MD    Last refill: 1/7/2025    Rx #: [id number]    Antihistamines Passed 04/08/2025 10:20 AM   Protocol Details Appt in past 12 mos or next 1 mos    Medication is active on med list      To be filled at: [facility] [address] [place] [address] [phone], [phone]       Last office visit: 01/22/2025    Previously advised to follow up in one year     F/U currently scheduled? Not at this time       ACTION: Refilled per protocol.

## 2025-04-15 RX ORDER — ALBUTEROL SULFATE 90 UG/1
2 INHALANT RESPIRATORY (INHALATION) EVERY 4 HOURS PRN
Qty: 6.7 G | Refills: 0 | Status: SHIPPED | OUTPATIENT
Start: 2025-04-15

## 2025-04-15 NOTE — TELEPHONE ENCOUNTER
RN called pt and provided Dr. Rivera's recommendations listed below.  Provided call back number and office hours.

## 2025-04-15 NOTE — TELEPHONE ENCOUNTER
Call reviewed and noted.  Agree with triage advice provided including urgent care follow-up with PCP if patient feels she is in need of steroids or antibiotics.  May continue with albuterol every 4 hours if providing relief.

## 2025-04-15 NOTE — TELEPHONE ENCOUNTER
Refill requested for:  Name from pharmacy: ALBUTEROL SULFATE HFA HFA AEROSOL SOLN          Will file in chart as: ALBUTEROL 108 (90 Base) MCG/ACT Inhalation Aero Soln    Sig: INHALE 2 PUFFS INTO THE LUNGS EVERY 4 (FOUR) HOURS AS NEEDED FOR WHEEZING.    Disp: 6.7 g    Refills: 2    Start: 4/15/2025    Class: Normal    Non-formulary    Last refill: 1/7/2025    Rescue Inhalers Failed 04/15/2025 01:33 PM   Protocol Details Pass - Pending Nurse Triage Call    Appt in past 6 mos or next 3 mos    Last Refill > 30 days    Medication is active on med list      To be filled at: [facility] - [address] [phone], [phone]          Last office visit: 1/22/2025    Previously advised to follow up in 6 months or sooner if needed    F/U currently scheduled? Yes on 9/9/2025    Date of last refill: 1/22/2025    ACTION: RN called to triage pt's asthma status.  Pt reports that she has been flaring and needing to use her albuterol every 4-6 hours every day for the last 6 days  States that she has been coughing up green mucus and \"feels like her lungs are stuck together\" and sometimes has a difficult time taking a deep breath in.  Reports that she is doing better than she had been.  However, pt coughing frequently while on phone with RN.  Cough is deep and barky.  Pt repots that she is using her daily inhaler (likely Symbicort but she cannot remember the name).  Pt also believes she may have a sinus infection.    RN advised that due to her needing to use her albuterol every 4-6 hours and feeling like she is only getting minimal relief and also coughing up green mucus, she should follow up in urgent care or ER.  Pt denies wanting to go and is wondering if she needs steroids and antibiotics.   RN advised that yes, she may and may also need a chest xray given productive cough.  RN advised that unfortunately Dr. Rivera's schedule is completely booked for the next few months and will need to be  seen sooner for assessment.  Pt reports she may follow up with her PCP to see if they have any openings.     Routed to Dr. Rivera.  Refill sent per protocol.

## 2025-04-23 ENCOUNTER — LAB ENCOUNTER (OUTPATIENT)
Dept: LAB | Facility: HOSPITAL | Age: 65
End: 2025-04-23
Attending: INTERNAL MEDICINE
Payer: MEDICARE

## 2025-04-23 DIAGNOSIS — H25.13 AGE-RELATED NUCLEAR CATARACT OF BOTH EYES: ICD-10-CM

## 2025-04-23 DIAGNOSIS — E11.65 TYPE 2 DIABETES MELLITUS WITH HYPERGLYCEMIA, WITHOUT LONG-TERM CURRENT USE OF INSULIN (HCC): ICD-10-CM

## 2025-04-23 DIAGNOSIS — R61 HYPERHIDROSIS: ICD-10-CM

## 2025-04-23 DIAGNOSIS — Z71.6 ENCOUNTER FOR SMOKING CESSATION COUNSELING: ICD-10-CM

## 2025-04-23 DIAGNOSIS — E78.00 HYPERCHOLESTEROLEMIA: ICD-10-CM

## 2025-04-23 DIAGNOSIS — F41.8 DEPRESSION WITH ANXIETY: ICD-10-CM

## 2025-04-23 DIAGNOSIS — E55.9 VITAMIN D DEFICIENCY: ICD-10-CM

## 2025-04-23 DIAGNOSIS — N95.1 HOT FLASHES DUE TO MENOPAUSE: ICD-10-CM

## 2025-04-23 DIAGNOSIS — R09.81 SINUS CONGESTION: ICD-10-CM

## 2025-04-23 DIAGNOSIS — Z00.00 MEDICARE ANNUAL WELLNESS VISIT, SUBSEQUENT: ICD-10-CM

## 2025-04-23 LAB
ALBUMIN SERPL-MCNC: 4.5 G/DL (ref 3.2–4.8)
ALBUMIN/GLOB SERPL: 2 {RATIO} (ref 1–2)
ALP LIVER SERPL-CCNC: 63 U/L (ref 55–142)
ALT SERPL-CCNC: 18 U/L (ref 10–49)
ANION GAP SERPL CALC-SCNC: 3 MMOL/L (ref 0–18)
AST SERPL-CCNC: 21 U/L (ref ?–34)
BASOPHILS # BLD AUTO: 0.09 X10(3) UL (ref 0–0.2)
BASOPHILS NFR BLD AUTO: 1.7 %
BILIRUB SERPL-MCNC: 0.6 MG/DL (ref 0.2–1.1)
BILIRUB UR QL: NEGATIVE
BUN BLD-MCNC: 13 MG/DL (ref 9–23)
BUN/CREAT SERPL: 12.5 (ref 10–20)
CALCIUM BLD-MCNC: 9.4 MG/DL (ref 8.7–10.4)
CHLORIDE SERPL-SCNC: 103 MMOL/L (ref 98–112)
CHOLEST SERPL-MCNC: 267 MG/DL (ref ?–200)
CO2 SERPL-SCNC: 32 MMOL/L (ref 21–32)
COLOR UR: YELLOW
CREAT BLD-MCNC: 1.04 MG/DL (ref 0.55–1.02)
CREAT UR-SCNC: 108.9 MG/DL
DEPRECATED RDW RBC AUTO: 47.8 FL (ref 35.1–46.3)
EGFRCR SERPLBLD CKD-EPI 2021: 59 ML/MIN/1.73M2 (ref 60–?)
EOSINOPHIL # BLD AUTO: 0.17 X10(3) UL (ref 0–0.7)
EOSINOPHIL NFR BLD AUTO: 3.2 %
ERYTHROCYTE [DISTWIDTH] IN BLOOD BY AUTOMATED COUNT: 14 % (ref 11–15)
EST. AVERAGE GLUCOSE BLD GHB EST-MCNC: 214 MG/DL (ref 68–126)
FASTING PATIENT LIPID ANSWER: YES
FASTING STATUS PATIENT QL REPORTED: YES
GLOBULIN PLAS-MCNC: 2.3 G/DL (ref 2–3.5)
GLUCOSE BLD-MCNC: 159 MG/DL (ref 70–99)
GLUCOSE UR-MCNC: NORMAL MG/DL
HBA1C MFR BLD: 9.1 % (ref ?–5.7)
HCT VFR BLD AUTO: 39.9 % (ref 35–48)
HDLC SERPL-MCNC: 71 MG/DL (ref 40–59)
HGB BLD-MCNC: 13.2 G/DL (ref 12–16)
HGB UR QL STRIP.AUTO: NEGATIVE
IMM GRANULOCYTES # BLD AUTO: 0.01 X10(3) UL (ref 0–1)
IMM GRANULOCYTES NFR BLD: 0.2 %
KETONES UR-MCNC: NEGATIVE MG/DL
LDLC SERPL CALC-MCNC: 188 MG/DL (ref ?–100)
LEUKOCYTE ESTERASE UR QL STRIP.AUTO: 500
LYMPHOCYTES # BLD AUTO: 1.99 X10(3) UL (ref 1–4)
LYMPHOCYTES NFR BLD AUTO: 37.5 %
MCH RBC QN AUTO: 30.3 PG (ref 26–34)
MCHC RBC AUTO-ENTMCNC: 33.1 G/DL (ref 31–37)
MCV RBC AUTO: 91.7 FL (ref 80–100)
MICROALBUMIN UR-MCNC: 1.9 MG/DL
MICROALBUMIN/CREAT 24H UR-RTO: 17.4 UG/MG (ref ?–30)
MONOCYTES # BLD AUTO: 0.47 X10(3) UL (ref 0.1–1)
MONOCYTES NFR BLD AUTO: 8.9 %
NEUTROPHILS # BLD AUTO: 2.57 X10 (3) UL (ref 1.5–7.7)
NEUTROPHILS # BLD AUTO: 2.57 X10(3) UL (ref 1.5–7.7)
NEUTROPHILS NFR BLD AUTO: 48.5 %
NITRITE UR QL STRIP.AUTO: NEGATIVE
NONHDLC SERPL-MCNC: 196 MG/DL (ref ?–130)
OSMOLALITY SERPL CALC.SUM OF ELEC: 289 MOSM/KG (ref 275–295)
PH UR: 7.5 [PH] (ref 5–8)
PLATELET # BLD AUTO: 234 10(3)UL (ref 150–450)
POTASSIUM SERPL-SCNC: 4.3 MMOL/L (ref 3.5–5.1)
PROT SERPL-MCNC: 6.8 G/DL (ref 5.7–8.2)
PROT UR-MCNC: NEGATIVE MG/DL
RBC # BLD AUTO: 4.35 X10(6)UL (ref 3.8–5.3)
RBC #/AREA URNS AUTO: >10 /HPF
SODIUM SERPL-SCNC: 138 MMOL/L (ref 136–145)
SP GR UR STRIP: 1.02 (ref 1–1.03)
TRIGL SERPL-MCNC: 54 MG/DL (ref 30–149)
TSI SER-ACNC: 2.83 UIU/ML (ref 0.55–4.78)
UROBILINOGEN UR STRIP-ACNC: NORMAL
VLDLC SERPL CALC-MCNC: 11 MG/DL (ref 0–30)
WBC # BLD AUTO: 5.3 X10(3) UL (ref 4–11)
WBC #/AREA URNS AUTO: >50 /HPF

## 2025-04-23 PROCEDURE — 81001 URINALYSIS AUTO W/SCOPE: CPT

## 2025-04-23 PROCEDURE — 83036 HEMOGLOBIN GLYCOSYLATED A1C: CPT

## 2025-04-23 PROCEDURE — 80053 COMPREHEN METABOLIC PANEL: CPT

## 2025-04-23 PROCEDURE — 84443 ASSAY THYROID STIM HORMONE: CPT

## 2025-04-23 PROCEDURE — 80061 LIPID PANEL: CPT

## 2025-04-23 PROCEDURE — 82570 ASSAY OF URINE CREATININE: CPT

## 2025-04-23 PROCEDURE — 85025 COMPLETE CBC W/AUTO DIFF WBC: CPT

## 2025-04-23 PROCEDURE — 82043 UR ALBUMIN QUANTITATIVE: CPT

## 2025-04-23 PROCEDURE — 36415 COLL VENOUS BLD VENIPUNCTURE: CPT

## 2025-04-24 ENCOUNTER — TELEPHONE (OUTPATIENT)
Dept: INTERNAL MEDICINE CLINIC | Facility: CLINIC | Age: 65
End: 2025-04-24

## 2025-04-24 DIAGNOSIS — E11.69 TYPE 2 DIABETES MELLITUS WITH OTHER SPECIFIED COMPLICATION, WITHOUT LONG-TERM CURRENT USE OF INSULIN (HCC): ICD-10-CM

## 2025-04-24 DIAGNOSIS — N18.31 STAGE 3A CHRONIC KIDNEY DISEASE (HCC): Primary | ICD-10-CM

## 2025-04-24 DIAGNOSIS — E78.00 HYPERCHOLESTEROLEMIA: ICD-10-CM

## 2025-04-24 RX ORDER — CIPROFLOXACIN 250 MG/1
250 TABLET, FILM COATED ORAL 2 TIMES DAILY
Qty: 10 TABLET | Refills: 0 | Status: SHIPPED | OUTPATIENT
Start: 2025-04-24 | End: 2025-04-29

## 2025-04-24 RX ORDER — AZELASTINE HYDROCHLORIDE 0.5 MG/ML
1 SOLUTION/ DROPS OPHTHALMIC 2 TIMES DAILY
Qty: 18 ML | Refills: 0 | Status: SHIPPED | OUTPATIENT
Start: 2025-04-24

## 2025-04-24 RX ORDER — ATORVASTATIN CALCIUM 20 MG/1
20 TABLET, FILM COATED ORAL EVERY OTHER DAY
Qty: 90 TABLET | Refills: 1 | Status: SHIPPED | OUTPATIENT
Start: 2025-04-24 | End: 2025-04-25

## 2025-04-24 RX ORDER — GLIPIZIDE 2.5 MG/1
2.5 TABLET, EXTENDED RELEASE ORAL
Qty: 30 TABLET | Refills: 2 | Status: SHIPPED | OUTPATIENT
Start: 2025-04-24

## 2025-04-24 NOTE — TELEPHONE ENCOUNTER
Attempted to call the patient, no answer. Left a voicemail to call the office back.     Daughter not on RADHA

## 2025-04-24 NOTE — TELEPHONE ENCOUNTER
Dr. Vargas patient was called and inform of your message on her test results and she verbalized understanding.   Patient stated that she is having urinary symptoms. She has a uncomfortable feeling when she urinates, urine frequency and she feels nauseous. No burning on urination no visible blood and no fever but per patient she never gets a fever. Please advise if you will be sending her a antibiotic. Thank you       Janessa Lam  E [address] [address] [phone]

## 2025-04-25 ENCOUNTER — TELEPHONE (OUTPATIENT)
Dept: INTERNAL MEDICINE CLINIC | Facility: CLINIC | Age: 65
End: 2025-04-25

## 2025-04-25 RX ORDER — BLOOD SUGAR DIAGNOSTIC
STRIP MISCELLANEOUS
Qty: 100 EACH | Refills: 3 | Status: SHIPPED | OUTPATIENT
Start: 2025-04-25

## 2025-04-25 RX ORDER — LANCETS
EACH MISCELLANEOUS
Qty: 100 EACH | Refills: 3 | Status: SHIPPED | OUTPATIENT
Start: 2025-04-25

## 2025-04-25 RX ORDER — BLOOD-GLUCOSE METER
1 EACH MISCELLANEOUS DAILY
Qty: 1 KIT | Refills: 0 | Status: SHIPPED | OUTPATIENT
Start: 2025-04-25

## 2025-04-25 RX ORDER — ROSUVASTATIN CALCIUM 10 MG/1
10 TABLET, COATED ORAL NIGHTLY
Qty: 90 TABLET | Refills: 1 | Status: SHIPPED | OUTPATIENT
Start: 2025-04-25

## 2025-04-25 NOTE — TELEPHONE ENCOUNTER
Patient also states she was advised by Dr Vargas to follow up with nephrology and is unable to get an appointment until September.  Message forwarded to Nephrology to see if patient is able to get a sooner appointment.  Thank you.

## 2025-04-25 NOTE — TELEPHONE ENCOUNTER
Patient advised of 's message below and verbalized understanding.     Patient states she has a history of uti and kidney stones. She will go to ER or urgent care if any worsening symptoms over the weekend.

## 2025-04-25 NOTE — TELEPHONE ENCOUNTER
Spoke with pharmacist and Dr Vargas's message given.     Spoke with patient who states she does not have diabetic testing supplies.  Supplies sent to pharmacy.

## 2025-04-25 NOTE — TELEPHONE ENCOUNTER
Patient states she went to  cipro from pharmacy and was told there is an interaction with one of her medications and they can't release the antibiotic until they speak with her physician.    Patient also states she had requested Rosuvastatin instead of Atorvastatin \"atorvastatin doesn't work for me and my family members take rosuvastatin and it works better for them.  I requested this yesterday and atorvastatin was still sent\"  Please advise.       Called pharmacy and per pharmacist cipro is an inhibitor with glipizide and may lower patient's blood sugar.  Please advise if ok to still dispense

## 2025-05-01 ENCOUNTER — MED REC SCAN ONLY (OUTPATIENT)
Dept: INTERNAL MEDICINE CLINIC | Facility: CLINIC | Age: 65
End: 2025-05-01

## 2025-05-02 ENCOUNTER — TELEPHONE (OUTPATIENT)
Dept: INTERNAL MEDICINE CLINIC | Facility: CLINIC | Age: 65
End: 2025-05-02

## 2025-05-02 NOTE — TELEPHONE ENCOUNTER
Called patient and advised that shingles vaccine due to insurance is not covered in MD office, advised to check with pharmacy for vaccine coverage, pt verbalized understanding and agreed, no further questions at this time.

## 2025-05-05 ENCOUNTER — TELEPHONE (OUTPATIENT)
Facility: CLINIC | Age: 65
End: 2025-05-05

## 2025-05-08 ENCOUNTER — TELEPHONE (OUTPATIENT)
Facility: CLINIC | Age: 65
End: 2025-05-08

## 2025-05-08 NOTE — TELEPHONE ENCOUNTER
Colon recall.     Please provide orders if ok to schedule directly.     Reviewed allergies, pharmacy, medications and health history.     Last Procedure, Date, MD:  Colon/EGD 11/20/2018, Dr Marcus  Last Diagnosis:  small internal hemorrhoids only on colonoscopy  Recalled (mth/yrs): 5 years  Sedation Used Previously:  MAC  Last Prep Used (if known):  Colyte  Quality Of Prep (if known): good  Anticoagulants: no  Diuretics:  ACE/ARB Inhibitor's:   Diabetic Medication (oral/insulin): glipizide, Januvia  Weight loss Medication: no  Iron/Herbal/Multivitamin Supplements: MVI  Marijuana/Vaping/CBD: no  Height/Weight: 5'6\"/136  BMI:22.27  Hx of Cardiac &/or CVA Issues (MI/Stroke):no  If yes, in the last 12 months?   Devices Pacemaker/Defibrillator/Stents:no  Respiratory Issues/Oxygen Use/JENN/COPD: COPD  CiPAP/BiPAP:   Issues w/ Anesthesia: low blood pressure, nausea    Symptoms (Y/N): no  Symptoms Details: N/A    Special Comments/Notes: Medicare wellness exam 03/19/2025 w/Dr Vargas    Thank you!      Juan Marcus MD   Physician  Gastroenterology     Operative Report  Signed     Date of Service: 11/20/2018 10:50 AM     Signed         EGD AND COLONOSCOPY PROCEDURE REPORTS:     DATE OF PROCEDURE:  11/20/2018     PCP: Melody Glover MD     PREOPERATIVE DIAGNOSIS:  Nausea, abnormal weight loss, screening colonoscopy      POSTOPERATIVE DIAGNOSIS:  See impression.     SURGEON:  Juan Marcus M.D.     SEDATION:    MAC anesthesia provided by the Anesthesia Service.  MAC anesthesia requested due to anticipated intolerance of EGD and colonoscopy examinations under safe doses of conscious sedation medication.     EGD PROCEDURE:    After the nature and risks of EGD and colonoscopy examinations under MAC anesthesia were discussed with the patient and all questions answered, informed consent was obtained.  The patient was sedated as above.       Once sedated, the Olympus adult diagnostic gastroscope was placed in the  patient's mouth and advanced under direct visualization through the oropharynx into the esophagus, on down through the stomach and pylorus to the duodenal bulb and second portions of the duodenum.  Retroflexion was performed in the stomach.     EGD FINDINGS:    Esophagus and GE junction: Mild reflux changes distal esophagus with some mucosal edema.  No abnormal mucosa, no mucosal erosions.  Possible <2 cm hiatal hernia.     Stomach: Clear secretions present.  Healthy gastric mucosa proximally and distally.  Random gastric mucosal biopsies obtained to evaluate the nausea and abnormal weight loss.     Duodenum: Clear secretions present.  Normal duodenal bulb and second, third portions duodenum.  Random duodenal mucosal biopsies obtained.     COLONOSCOPY PROCEDURE:    The patient was repositioned for colonoscopy examination.  Additional sedation given.  Digital rectal exam was performed, which showed no masses.  The Olympus pediatric video colonoscope was advanced under direct visualization through the entire length of the colon to the cecum and terminal ileum.  Retroflexed exam performed up the ascending colon.  Cecum was confirmed by landmarks, including appendiceal orifice, cecal strap, ileocecal valve.  Retroflexion was performed in the rectum.     The quality of the prep was good.     COLONOSCOPY FINDINGS:  Small internal hemorrhoids only on colonoscopy examination to the cecum and terminal ileum as above.     IMPRESSION:  Questionable hiatal hernia, very mild reflux esophagitis only on EGD examination.  Normal gastric and duodenal mucosa.  Random gastric and duodenal mucosal biopsies obtained as above.  Small internal hemorrhoids only on today's colonoscopy examination per     RECOMMENDATIONS:  Followup above gastric mucosal biopsy results.  High-fiber diet.  Repeat colonoscopy examination in 5 years.               Electronically signed by Juan Marcus MD at 11/20/2018 12:00  PM  ============================  Final Diagnosis:      A. Duodenum; biopsy:  Fragments of small intestinal/duodenal mucosa demonstrating unremarkable features including preservation of the villous and glandular architecture.  No evidence of viral inclusions, epithelioid granulomas, active/acute duodenitis, increased intraepithelial lymphocytes, polyps, epithelial dysplasia, or malignancy identified.  No evidence of tissue-invasive fungal organisms, parasitic organisms, or bacterial organisms consistent with Helicobacter species seen on Giemsa stain (with appropriate control reactivity).     B. Stomach; biopsy:  Fragments of gastric mucosa demonstrating mild chronic gastritis with foci of mild foveolar hyperplasia.   Single small fragment of small intestinal/duodenal mucosa also present demonstrating unremarkable features including preservation of the villous and glandular architecture.  No evidence of viral inclusions, epithelioid granulomas, active/acute gastritis/duodenitis, increased intraepithelial lymphocytes, polyps, gastric intestinal metaplasia, epithelial dysplasia, or malignancy identified.  No evidence of tissue-invasive fungal organisms, parasitic organisms, or bacterial organisms consistent with Helicobacter species seen on Giemsa stain (with appropriate control reactivity).

## 2025-05-16 RX ORDER — SODIUM, POTASSIUM,MAG SULFATES 17.5-3.13G
SOLUTION, RECONSTITUTED, ORAL ORAL
Qty: 1 EACH | Refills: 0 | Status: SHIPPED | OUTPATIENT
Start: 2025-05-16

## 2025-05-16 NOTE — TELEPHONE ENCOUNTER
Thank you so much Bhupinder.    Medicare wellness exam 03/19/2025 w/Dr Franck Vargas reviewed.  glipizide, Januvia    My previous EGD/colonoscopy procedure report 11/20/2018 reviewed.    GI schedulers -    Please schedule colonoscopy exam at [facility])    BMI Readings from Last 1 Encounters:   03/19/25 22.27 kg/m²     MAC anesthesia     BP Readings from Last 5 Encounters:   03/19/25 124/69   11/02/24 119/67   10/21/24 103/57   08/12/24 115/63   06/24/24 105/66       Suprep small volume bowel prep if covered by insurance, otherwise   Golytely (PEG) 4L bowel prep  Rx sent in to [facility]      DX = screening colonoscopy examination    Medication instructions:    Hold Januvia day of procedure    Hold non-metformin diabetic oral med glipizide X 2 days prior to procedure (day of prep and day of exam).

## 2025-05-19 NOTE — TELEPHONE ENCOUNTER
Left message  for patient to call back to schedule Procedure    Please transfer to GI scheduling

## 2025-05-20 ENCOUNTER — TELEPHONE (OUTPATIENT)
Dept: INTERNAL MEDICINE CLINIC | Facility: CLINIC | Age: 65
End: 2025-05-20

## 2025-05-20 DIAGNOSIS — E11.65 TYPE 2 DIABETES MELLITUS WITH HYPERGLYCEMIA, WITHOUT LONG-TERM CURRENT USE OF INSULIN (HCC): Primary | ICD-10-CM

## 2025-05-20 NOTE — TELEPHONE ENCOUNTER
To complete the PA for this pt:    1. Go to  [email or web address] and click \"Enter a Key\"  2. Enter the pts last name, , and key.   Key:[id number]     Pts last name:Alexandro     : 1960             Accu chek guide test strips 100s               3. Complete the form and click \"Send to Plan\"    Please notify us when you receive the determination from the plan.

## 2025-05-22 RX ORDER — CALCIUM CITRATE/VITAMIN D3 200MG-6.25
1 TABLET ORAL DAILY
Qty: 100 STRIP | Refills: 11 | Status: SHIPPED | OUTPATIENT
Start: 2025-05-22

## 2025-05-22 RX ORDER — BLOOD-GLUCOSE METER
1 EACH MISCELLANEOUS DAILY
Qty: 1 EACH | Refills: 0 | Status: SHIPPED | OUTPATIENT
Start: 2025-05-22

## 2025-05-22 RX ORDER — AVOBENZONE, HOMOSALATE, OCTISALATE, OCTOCRYLENE 30; 40; 45; 26 MG/ML; MG/ML; MG/ML; MG/ML
1 CREAM TOPICAL DAILY
Qty: 100 EACH | Refills: 11 | Status: SHIPPED | OUTPATIENT
Start: 2025-05-22

## 2025-05-22 NOTE — TELEPHONE ENCOUNTER
Genny Mc (Key: [id number])  Need Help? Call us at [phone]  Status  New (Not sent to plan)  Drug  Accu-Chek Guide Test strips  ePA cloud logo  Form  [employer] Electronic PA Form    There was an error with your request  Eligibility could not be verified for this patient - patient not found. Please review patient information and re-submit.    Can you please reach out to the patient to provide most recent insurance

## 2025-06-03 ENCOUNTER — TELEPHONE (OUTPATIENT)
Facility: CLINIC | Age: 65
End: 2025-06-03

## 2025-06-09 DIAGNOSIS — E11.65 TYPE 2 DIABETES MELLITUS WITH HYPERGLYCEMIA, WITHOUT LONG-TERM CURRENT USE OF INSULIN (HCC): ICD-10-CM

## 2025-06-10 RX ORDER — SITAGLIPTIN 100 MG/1
100 TABLET, FILM COATED ORAL DAILY
Qty: 90 TABLET | Refills: 2 | Status: SHIPPED | OUTPATIENT
Start: 2025-06-10

## 2025-06-10 NOTE — TELEPHONE ENCOUNTER
Endocrine Refill protocol for oral and injectable diabetic medications    Protocol Criteria:  FAILED  Reason: Elevated A1C    If all below requirements are met, send a 90-day supply with 1 refill per provider protocol.    Verify appointment with Endocrinology completed in the last 6 months or scheduled in the next 3 months.  Verify A1C has been completed within the last 6 months and is below 8.5%     Last completed office visit: 10/21/2024 Dasia Gurrola MD   Next scheduled Follow up:   Future Appointments   Date Time Provider Department Center   9/8/2025  1:20 PM Janessa Minaya MD [id number]   9/9/2025  1:45 PM Kevin Rivera MD [id number] Pending sale to [facility]   11/28/2025  9:40 AM DOLORES, PROCEDURE [id number] None      Last A1c result: Last A1c value was 9.1% done 4/23/2025.

## 2025-07-24 RX ORDER — GABAPENTIN 800 MG/1
800 TABLET ORAL 3 TIMES DAILY
Qty: 90 TABLET | Refills: 2 | Status: SHIPPED | OUTPATIENT
Start: 2025-07-24

## 2025-07-30 ENCOUNTER — TELEPHONE (OUTPATIENT)
Dept: INTERNAL MEDICINE CLINIC | Facility: CLINIC | Age: 65
End: 2025-07-30

## 2025-08-07 ENCOUNTER — LAB ENCOUNTER (OUTPATIENT)
Dept: LAB | Facility: HOSPITAL | Age: 65
End: 2025-08-07
Attending: INTERNAL MEDICINE

## 2025-08-07 DIAGNOSIS — E55.9 VITAMIN D DEFICIENCY: ICD-10-CM

## 2025-08-07 DIAGNOSIS — E11.65 TYPE 2 DIABETES MELLITUS WITH HYPERGLYCEMIA, WITHOUT LONG-TERM CURRENT USE OF INSULIN (HCC): ICD-10-CM

## 2025-08-07 DIAGNOSIS — E03.9 ACQUIRED HYPOTHYROIDISM: ICD-10-CM

## 2025-08-07 DIAGNOSIS — E78.00 HYPERCHOLESTEROLEMIA: ICD-10-CM

## 2025-08-07 LAB
ALBUMIN SERPL-MCNC: 4.8 G/DL (ref 3.2–4.8)
ALBUMIN/GLOB SERPL: 2.3 (ref 1–2)
ALP LIVER SERPL-CCNC: 55 U/L (ref 55–142)
ALT SERPL-CCNC: 16 U/L (ref 10–49)
ANION GAP SERPL CALC-SCNC: 3 MMOL/L (ref 0–18)
AST SERPL-CCNC: 18 U/L (ref ?–34)
BILIRUB SERPL-MCNC: 0.5 MG/DL (ref 0.2–1.1)
BUN BLD-MCNC: 14 MG/DL (ref 9–23)
BUN/CREAT SERPL: 15.1 (ref 10–20)
CALCIUM BLD-MCNC: 9.6 MG/DL (ref 8.7–10.4)
CHLORIDE SERPL-SCNC: 105 MMOL/L (ref 98–112)
CHOLEST SERPL-MCNC: 212 MG/DL (ref ?–200)
CO2 SERPL-SCNC: 29 MMOL/L (ref 21–32)
CREAT BLD-MCNC: 0.93 MG/DL (ref 0.55–1.02)
CREAT UR-SCNC: 87.9 MG/DL
EGFRCR SERPLBLD CKD-EPI 2021: 67 ML/MIN/1.73M2 (ref 60–?)
FASTING PATIENT LIPID ANSWER: YES
FASTING STATUS PATIENT QL REPORTED: YES
GLOBULIN PLAS-MCNC: 2.1 G/DL (ref 2–3.5)
GLUCOSE BLD-MCNC: 171 MG/DL (ref 70–99)
HDLC SERPL-MCNC: 76 MG/DL (ref 40–59)
LDLC SERPL CALC-MCNC: 124 MG/DL (ref ?–100)
MICROALBUMIN UR-MCNC: <0.3 MG/DL
NONHDLC SERPL-MCNC: 136 MG/DL (ref ?–130)
OSMOLALITY SERPL CALC.SUM OF ELEC: 289 MOSM/KG (ref 275–295)
POTASSIUM SERPL-SCNC: 3.9 MMOL/L (ref 3.5–5.1)
PROT SERPL-MCNC: 6.9 G/DL (ref 5.7–8.2)
SODIUM SERPL-SCNC: 137 MMOL/L (ref 136–145)
T4 FREE SERPL-MCNC: 1.4 NG/DL (ref 0.8–1.7)
TRIGL SERPL-MCNC: 67 MG/DL (ref 30–149)
TSI SER-ACNC: 1.13 UIU/ML (ref 0.55–4.78)
VIT B12 SERPL-MCNC: 514 PG/ML (ref 211–911)
VIT D+METAB SERPL-MCNC: 46 NG/ML (ref 30–100)
VLDLC SERPL CALC-MCNC: 12 MG/DL (ref 0–30)

## 2025-08-07 PROCEDURE — 82570 ASSAY OF URINE CREATININE: CPT

## 2025-08-07 PROCEDURE — 36415 COLL VENOUS BLD VENIPUNCTURE: CPT

## 2025-08-07 PROCEDURE — 82607 VITAMIN B-12: CPT

## 2025-08-07 PROCEDURE — 84443 ASSAY THYROID STIM HORMONE: CPT

## 2025-08-07 PROCEDURE — 80061 LIPID PANEL: CPT

## 2025-08-07 PROCEDURE — 82043 UR ALBUMIN QUANTITATIVE: CPT

## 2025-08-07 PROCEDURE — 80053 COMPREHEN METABOLIC PANEL: CPT

## 2025-08-07 PROCEDURE — 82306 VITAMIN D 25 HYDROXY: CPT

## 2025-08-07 PROCEDURE — 84439 ASSAY OF FREE THYROXINE: CPT

## 2025-08-09 ENCOUNTER — RESULTS FOLLOW-UP (OUTPATIENT)
Dept: ENDOCRINOLOGY CLINIC | Facility: CLINIC | Age: 65
End: 2025-08-09

## 2025-08-13 ENCOUNTER — TELEPHONE (OUTPATIENT)
Dept: INTERNAL MEDICINE CLINIC | Facility: CLINIC | Age: 65
End: 2025-08-13

## 2025-08-13 DIAGNOSIS — E11.65 TYPE 2 DIABETES MELLITUS WITH HYPERGLYCEMIA, WITHOUT LONG-TERM CURRENT USE OF INSULIN (HCC): Primary | ICD-10-CM

## (undated) DIAGNOSIS — R80.9 TYPE 2 DIABETES MELLITUS WITH MICROALBUMINURIA, WITHOUT LONG-TERM CURRENT USE OF INSULIN (HCC): ICD-10-CM

## (undated) DIAGNOSIS — R11.0 CHRONIC NAUSEA: ICD-10-CM

## (undated) DIAGNOSIS — E78.00 HYPERCHOLESTEROLEMIA: ICD-10-CM

## (undated) DIAGNOSIS — E03.9 ACQUIRED HYPOTHYROIDISM: ICD-10-CM

## (undated) DIAGNOSIS — R05.9 COUGH: ICD-10-CM

## (undated) DIAGNOSIS — E11.65 TYPE 2 DIABETES MELLITUS WITH HYPERGLYCEMIA, WITHOUT LONG-TERM CURRENT USE OF INSULIN (HCC): ICD-10-CM

## (undated) DIAGNOSIS — E11.29 TYPE 2 DIABETES MELLITUS WITH MICROALBUMINURIA, WITHOUT LONG-TERM CURRENT USE OF INSULIN (HCC): ICD-10-CM

## (undated) DIAGNOSIS — E11.65 TYPE 2 DIABETES MELLITUS WITH HYPERGLYCEMIA, WITHOUT LONG-TERM CURRENT USE OF INSULIN (HCC): Primary | ICD-10-CM

## (undated) DEVICE — ENDOSCOPY PACK UPPER: Brand: MEDLINE INDUSTRIES, INC.

## (undated) DEVICE — FORCEP RADIAL JAW 4

## (undated) DEVICE — ENDOSCOPY PACK - LOWER: Brand: MEDLINE INDUSTRIES, INC.

## (undated) DEVICE — LINE MNTR ADLT SET O2 INTMD

## (undated) DEVICE — STERILE LATEX POWDER-FREE SURGICAL GLOVESWITH NITRILE COATING: Brand: PROTEXIS

## (undated) DEVICE — CONMED SCOPE SAVER BITE BLOCK, 20X27 MM: Brand: SCOPE SAVER

## (undated) DEVICE — Device: Brand: DEFENDO AIR/WATER/SUCTION AND BIOPSY VALVE

## (undated) NOTE — LETTER
07/20/21        [facility]  [address]  [facility] [address]      Dear Sayda Fish,    [facility] records indicate that you have outstanding lab work and or testing that was ordered for you and has not yet been completed:   CT ABDOMEN+PELVIS(CONTRAST ONLY)(CPT=74

## (undated) NOTE — LETTER
04/29/21    Sai Patient  [address]  [facility] [address]      Dear [facility] Bettye,    [facility] records indicate that you have outstanding lab work and or testing that was ordered for you and has not yet been completed: Please call Central Scheduling at [phone]

## (undated) NOTE — LETTER
02/13/19 [address]  [facility] [address]      Dear Roberto Carlos Ervin,    [facility] records indicate that you have outstanding lab work and or testing that was ordered for you and has not yet been completed:    NM GI GASTRIC EMPTYING STUDY -- Order

## (undated) NOTE — ED AVS SNAPSHOT
Tosha Hutchins   MRN: [id number]    Department:  [facility] Emergency Department   Date of Visit:  11/23/2018           Disclosure     Insurance plans vary and the physician(s) referred by the ER may not be covered by your plan.  Please contact y CARE PHYSICIAN AT ONCE OR RETURN IMMEDIATELY TO THE EMERGENCY DEPARTMENT. If you have been prescribed any medication(s), please fill your prescription right away and begin taking the medication(s) as directed.   If you believe that any of the medications

## (undated) NOTE — LETTER
12/2/2018              [facility]         Dear Ms.  Rashard Car,    As we discussed, your stomach endoscopy exam (\"EGD\") at [facility] on 11/20/2018 showed mild acid reflux \"GERD\" inflammation of

## (undated) NOTE — MR AVS SNAPSHOT
[address]  [phone]               Thank you for choosing us for your health care visit with Erik Silva.  MD Davonte.  We are glad to serve you and happy to provide you with this summar This list is accurate as of: 2/21/17 10:05 AM.  Always use your most recent med list.                BD SWAB SINGLE USE REGULAR Pads           ClonazePAM 0.5 MG Tabs   Take 0.5 mg by mouth nightly.    Commonly known as:  KLONOPIN           FLUoxetine HCl 40

## (undated) NOTE — MR AVS SNAPSHOT
[address]  [phone]               Thank you for choosing us for your health care visit with Edgar Rudd.  MD Davonte.  We are glad to serve you and happy to provide you with this summar Commonly known as:  DITROPAN           OxyCODONE HCl IR 30 MG Tabs   Take 30 mg by mouth as needed. Commonly known as:  ROXICODONE           Prochlorperazine Maleate 10 mg tablet   Take 10 mg by mouth as needed.    Commonly known as:  COMPAZINE

## (undated) NOTE — Clinical Note
Hi!    I met with Mirtha Carias today to review nutrition. After your last appointment, she started taking Glimepiride but also almost completley eliminate carbs which resulted in a scary low blood sugar for her. She stopped taking the Glimepiride then. We reviewed healthy/low carb eating and I advised her to stay off the Glimepiride if she was going to follow this meal plan or until she heard otherwise from you. At 11:30am, pt had coffee with cream (A LOT) and splenda. I checked her blood sugar in clinic around 1:30pm and it was 136 mg/dl. She will work on the nutrition and being more active and send us an update in 1 week with her BG's and nutrition update    Thanks!   [employer]

## (undated) NOTE — MR AVS SNAPSHOT
[address] [address]  [phone]               Thank you for choosing us for your health care visit with Waldo Florez MD.  We are glad to serve you and happy to provide you with this summary Assoc Dx:  Visit for screening mammogram [Z12.31]                 Follow-up Instructions     Return in about 6 weeks (around 6/21/2017) for a follow-up appointment.       Scheduling Instructions     Wednesday May 10, 2017     Imaging:  [facility] SCREENING BILAT clinic staff will provide you with the phone number you should call to schedule your appointment.      If you are confident that your benefit plan will not require a referral or authorization, such as [place], please feel free to schedule your felicity Commonly known as:  ANASPAZ,LEVSIN           Levothyroxine Sodium 75 MCG Tabs   Take 75 mcg by mouth daily.    Commonly known as:  SYNTHROID, LEVOTHROID           MetFORMIN HCl  MG Tb24   TAKE 2 TABLETS BY MOUTH TWO TIMES A DAY WITH MEALS   Commonly k

## (undated) NOTE — LETTER
05/18/21        [facility] [address]  [place] [address]      Dear Juliet Matos,    [facility] records indicate that you have outstanding lab work and or testing that was ordered for you and has not yet been completed:   CT ABDOMEN+PELVIS(CONTRAST ONLY)(CPT=74

## (undated) NOTE — LETTER
10/29/2018              Hi Noun        [address]         Dear Suly Houser,    It was a pleasure to see you. Your pap was normal.  There is no need for further testing at this time.   I look forward to seeing you at your next s

## (undated) NOTE — LETTER
10/20/2023    [facility]            Dear Karley Hager,      Our records indicate that you are due for an appointment for a Colonoscopy with Jordyn Paz MD. Our doctors are booking out about 3-5 months in advance for procedures. Please call our office to schedule a phone screening appointment to plan for the procedure(s). Your medical well-being is important to us. If your insurance requires a referral, please call your primary care office to request one.       Thank you,      The Physicians and Staff at [facility]